# Patient Record
Sex: MALE | Race: WHITE | NOT HISPANIC OR LATINO | Employment: FULL TIME | URBAN - METROPOLITAN AREA
[De-identification: names, ages, dates, MRNs, and addresses within clinical notes are randomized per-mention and may not be internally consistent; named-entity substitution may affect disease eponyms.]

---

## 2017-01-20 ENCOUNTER — TELEPHONE (OUTPATIENT)
Dept: CARDIOLOGY | Facility: CLINIC | Age: 57
End: 2017-01-20

## 2017-03-01 ENCOUNTER — TELEPHONE (OUTPATIENT)
Dept: CARDIOLOGY | Facility: CLINIC | Age: 57
End: 2017-03-01

## 2017-03-01 NOTE — TELEPHONE ENCOUNTER
Received fax from [facility] stating pt has not called in INR test since 11/12/16. Tried to call pt. Non-working number. Thanks, Karla

## 2017-03-08 ENCOUNTER — TELEPHONE (OUTPATIENT)
Dept: CARDIOLOGY | Facility: CLINIC | Age: 57
End: 2017-03-08

## 2017-03-10 RX ORDER — WARFARIN SODIUM 5 MG/1
5 TABLET ORAL
Qty: 180 TABLET | Refills: 0 | Status: SHIPPED | OUTPATIENT
Start: 2017-03-10 | End: 2017-03-10 | Stop reason: SDUPTHER

## 2017-03-10 RX ORDER — WARFARIN SODIUM 5 MG/1
TABLET ORAL
Qty: 180 TABLET | Refills: 0 | Status: SHIPPED | OUTPATIENT
Start: 2017-03-10 | End: 2017-07-23 | Stop reason: SDUPTHER

## 2017-05-03 ENCOUNTER — TELEPHONE (OUTPATIENT)
Dept: CARDIOLOGY | Facility: CLINIC | Age: 57
End: 2017-05-03

## 2017-07-24 RX ORDER — WARFARIN SODIUM 5 MG/1
TABLET ORAL
Qty: 180 TABLET | Refills: 0 | Status: SHIPPED | OUTPATIENT
Start: 2017-07-24 | End: 2017-12-08 | Stop reason: SDUPTHER

## 2017-11-06 ENCOUNTER — TELEPHONE (OUTPATIENT)
Dept: CARDIOLOGY | Facility: CLINIC | Age: 57
End: 2017-11-06

## 2017-11-06 NOTE — TELEPHONE ENCOUNTER
Martha called requesting Home INR Form for pt that was sent tin October. Informed Dinora we have placed numerous calls to pt for appointments and INR's, no response. Pt has not been seen since 5/2016. Thanks, Karla

## 2017-12-07 ENCOUNTER — TELEPHONE (OUTPATIENT)
Dept: CARDIOLOGY | Facility: HOSPITAL | Age: 57
End: 2017-12-07

## 2017-12-07 ENCOUNTER — HOSPITAL ENCOUNTER (OUTPATIENT)
Dept: CARDIOLOGY | Facility: HOSPITAL | Age: 57
Setting detail: RECURRING SERIES
Discharge: HOME | End: 2017-12-07

## 2017-12-07 PROCEDURE — 36416 COLLJ CAPILLARY BLOOD SPEC: CPT

## 2017-12-07 PROCEDURE — 85610 PROTHROMBIN TIME: CPT

## 2017-12-07 NOTE — TELEPHONE ENCOUNTER
FYI: Called pt to inform spoke with Dinora this morning regarding INR meter. Pt requesting to have meter reactivated. Per Alere pt has been non-compliant with INR. Per pt appointments pt has not been seen by Dr. Lagunas since 2016. I myself has made numerous calls to pt along with Dinora. Called pt no answer, left voice mail informing pt to have PT-INR checked [place] [facility] with explanation on why meter will not be reactivated. Thanks, Karla

## 2017-12-08 RX ORDER — WARFARIN SODIUM 5 MG/1
5 TABLET ORAL
Qty: 60 TABLET | Refills: 0 | Status: SHIPPED | OUTPATIENT
Start: 2017-12-08 | End: 2017-12-20 | Stop reason: SDUPTHER

## 2017-12-08 NOTE — TELEPHONE ENCOUNTER
Pt called requesting Warfarin refill. Sent in 30 day supply with appointment scheduled for 1/10/18. Thanks, Karla

## 2017-12-14 RX ORDER — WARFARIN SODIUM 5 MG/1
TABLET ORAL
Qty: 180 TABLET | Refills: 0 | Status: SHIPPED | OUTPATIENT
Start: 2017-12-14 | End: 2019-03-28 | Stop reason: SDUPTHER

## 2017-12-20 ENCOUNTER — HOSPITAL ENCOUNTER (OUTPATIENT)
Dept: CARDIOLOGY | Facility: HOSPITAL | Age: 57
Setting detail: RECURRING SERIES
Discharge: HOME | End: 2017-12-20

## 2017-12-20 PROCEDURE — 85610 PROTHROMBIN TIME: CPT

## 2017-12-20 PROCEDURE — 36416 COLLJ CAPILLARY BLOOD SPEC: CPT

## 2017-12-20 RX ORDER — WARFARIN SODIUM 5 MG/1
TABLET ORAL
Qty: 60 TABLET | Refills: 0 | Status: SHIPPED | OUTPATIENT
Start: 2017-12-20 | End: 2017-12-20 | Stop reason: SDUPTHER

## 2017-12-21 RX ORDER — WARFARIN SODIUM 5 MG/1
TABLET ORAL
Qty: 180 TABLET | Refills: 0 | Status: SHIPPED | OUTPATIENT
Start: 2017-12-21 | End: 2018-06-08 | Stop reason: SDUPTHER

## 2017-12-29 ENCOUNTER — HOSPITAL ENCOUNTER (OUTPATIENT)
Dept: CARDIOLOGY | Facility: HOSPITAL | Age: 57
Setting detail: RECURRING SERIES
Discharge: HOME | End: 2017-12-29

## 2017-12-29 PROCEDURE — 85610 PROTHROMBIN TIME: CPT

## 2017-12-29 PROCEDURE — 36416 COLLJ CAPILLARY BLOOD SPEC: CPT

## 2018-01-10 ENCOUNTER — OFFICE VISIT (OUTPATIENT)
Dept: CARDIOLOGY | Facility: CLINIC | Age: 58
End: 2018-01-10

## 2018-01-10 ENCOUNTER — HOSPITAL ENCOUNTER (OUTPATIENT)
Dept: CARDIOLOGY | Facility: HOSPITAL | Age: 58
Setting detail: RECURRING SERIES
Discharge: HOME | End: 2018-01-10

## 2018-01-10 VITALS
SYSTOLIC BLOOD PRESSURE: 106 MMHG | HEART RATE: 73 BPM | WEIGHT: 188 LBS | BODY MASS INDEX: 27.85 KG/M2 | HEIGHT: 69 IN | DIASTOLIC BLOOD PRESSURE: 64 MMHG

## 2018-01-10 DIAGNOSIS — Z95.4 HISTORY OF AORTIC VALVE REPLACEMENT WITH METALLIC VALVE: Primary | ICD-10-CM

## 2018-01-10 PROCEDURE — 85610 PROTHROMBIN TIME: CPT

## 2018-01-10 PROCEDURE — 36416 COLLJ CAPILLARY BLOOD SPEC: CPT

## 2018-01-10 PROCEDURE — 99214 OFFICE O/P EST MOD 30 MIN: CPT | Performed by: INTERNAL MEDICINE

## 2018-01-10 PROCEDURE — 93000 ELECTROCARDIOGRAM COMPLETE: CPT | Performed by: INTERNAL MEDICINE

## 2018-01-10 NOTE — PROGRESS NOTES
Subjective:     Encounter Date:01/10/2018      Patient ID: Stew Cabral is a 57 y.o. male.    Chief Complaint:  History of Present Illness    This is a 57-year-old with a history of a bicuspid aortic valve resulting in critical aortic stenosis, status post mechanical aortic valve replacement with a 25 mm ATS valve in 1/2009, who presents for follow-up.    Patient was initially followed by Dr. Michelle Arenas.  Around the time of surgery he did have a nonischemic cardiomyopathy with an ejection fraction of 30%.  A repeat echocardiogram and follow-up later showed normalization of his ejection fraction.  I began following him in 12/2013 when he presented to his care.  I set him up for repeat echocardiogram at that time to continue show normal LV function and a normally functioning aortic valve.  He is been following his INRs with a home INR monitor since his surgery.  I saw him last in the office in 5/2016 at which time he was doing well.  He proceeded with a routine echocardiogram at that time it continued to show normal LV function and a normal functioning aortic valve.    The patient was lost to follow-up and has not been seen until today.  In the meanwhile he has been noncompliant with reporting his INR is 2L ear and unfortunately he has some been able to continue to use her services.  Since then he's been getting his INRs checked and her anticoagulation clinic in the office.  He just had his INR checked today and it was therapeutic at 3.  He otherwise feels well.  Denies any chest pain, shortness of breath, PND or orthopnea, presyncope or syncope, palpitations, or lower extremity edema.    Review of Systems   Constitution: Negative for weakness and malaise/fatigue.   HENT: Negative for hearing loss, hoarse voice, nosebleeds and sore throat.    Eyes: Negative for pain.   Cardiovascular: Negative for chest pain, claudication, cyanosis, dyspnea on exertion, irregular heartbeat, leg swelling, near-syncope,  orthopnea, palpitations, paroxysmal nocturnal dyspnea and syncope.   Respiratory: Negative for shortness of breath and snoring.    Endocrine: Negative for cold intolerance, heat intolerance, polydipsia, polyphagia and polyuria.   Skin: Negative for itching and rash.   Musculoskeletal: Negative for arthritis, falls, joint pain, joint swelling, muscle cramps, muscle weakness and myalgias.   Gastrointestinal: Negative for constipation, diarrhea, dysphagia, heartburn, hematemesis, hematochezia, melena, nausea and vomiting.   Genitourinary: Negative for frequency, hematuria and hesitancy.   Neurological: Negative for excessive daytime sleepiness, dizziness, headaches, light-headedness and numbness.   Psychiatric/Behavioral: Negative for depression. The patient is not nervous/anxious.           Current Outpatient Prescriptions:   •  lisdexamfetamine (VYVANSE) 50 MG capsule, Take 50 mg by mouth every morning., Disp: , Rfl:   •  warfarin (COUMADIN) 5 MG tablet, TAKE 1 TABLET BY MOUTH DAILY OR AS DIRECTED, Disp: 180 tablet, Rfl: 0  •  warfarin (COUMADIN) 5 MG tablet, TAKE 2 TABLETS BY MOUTH DAILY, Disp: 180 tablet, Rfl: 0    Past Medical History:   Diagnosis Date   • ADD (attention deficit disorder)    • Aortic valvar stenosis    • Bicuspid aortic valve    • CHF (congestive heart failure)    • Displacement of lumbar intervertebral disc      Past Surgical History:   Procedure Laterality Date   • AORTIC VALVE REPAIR/REPLACEMENT  2009   • CORONARY ANGIOPLASTY Left     AV stenosis   • CORONARY ARTERY BYPASS GRAFT       History reviewed. No pertinent family history.  Social History   Substance Use Topics   • Smoking status: Current Every Day Smoker   • Smokeless tobacco: None   • Alcohol use None           ECG 12 Lead  Date/Time: 1/10/2018 10:27 AM  Performed by: MONE DORANTES  Authorized by: MONE DORANTES   Comparison: compared with previous ECG   Similar to previous ECG  Rhythm: sinus rhythm  Other findings: LVH with  "strain               Objective:         Visit Vitals   • /64 (BP Location: Right arm, Patient Position: Sitting)   • Pulse 73   • Ht 175.3 cm (69\")   • Wt 85.3 kg (188 lb)   • BMI 27.76 kg/m2          Physical Exam   Constitutional: He is oriented to person, place, and time. He appears well-developed and well-nourished.   HENT:   Head: Normocephalic and atraumatic.   Eyes: Conjunctivae, EOM and lids are normal. Pupils are equal, round, and reactive to light.   Neck: Normal range of motion and full passive range of motion without pain. Neck supple. No JVD present. Carotid bruit is not present.   Cardiovascular: Normal rate, regular rhythm, S1 normal and S2 normal.  Exam reveals no gallop.    No murmur heard.  Pulses:       Radial pulses are 2+ on the right side, and 2+ on the left side.   No bilateral lower extremity edema  + aortic valve click   Pulmonary/Chest: Effort normal and breath sounds normal.   Abdominal: Soft. Normal appearance.   Lymphadenopathy:     He has no cervical adenopathy.   Neurological: He is alert and oriented to person, place, and time.   Skin: Skin is warm, dry and intact.   Psychiatric: He has a normal mood and affect.       Lab Review:       Assessment:          Diagnosis Plan   1. History of aortic valve replacement with metallic valve  Adult Transthoracic Echo Complete W/ Cont if Necessary Per Protocol          Plan:       1.  Status post mechanical aortic valve.  Appears to be functioning normally back in 6/2016 on his last echocardiogram.  On exam today it continues to sound like it's functioning normally.  Continue to monitor his INRs through our anticoagulation clinic.  Will plan on a routine follow-up echocardiogram at his next office visit in 1 year.     Will plan on seeing him back in 1 year with a repeat echocardiogram.          "

## 2018-02-01 ENCOUNTER — HOSPITAL ENCOUNTER (OUTPATIENT)
Dept: CARDIOLOGY | Facility: HOSPITAL | Age: 58
Setting detail: RECURRING SERIES
Discharge: HOME | End: 2018-02-01

## 2018-02-01 PROCEDURE — 85610 PROTHROMBIN TIME: CPT

## 2018-02-01 PROCEDURE — 36416 COLLJ CAPILLARY BLOOD SPEC: CPT

## 2018-02-08 ENCOUNTER — HOSPITAL ENCOUNTER (OUTPATIENT)
Dept: CARDIOLOGY | Facility: HOSPITAL | Age: 58
Setting detail: RECURRING SERIES
Discharge: HOME | End: 2018-02-08

## 2018-02-08 PROCEDURE — 85610 PROTHROMBIN TIME: CPT

## 2018-02-08 PROCEDURE — 36416 COLLJ CAPILLARY BLOOD SPEC: CPT

## 2018-02-13 ENCOUNTER — HOSPITAL ENCOUNTER (OUTPATIENT)
Dept: CARDIOLOGY | Facility: HOSPITAL | Age: 58
Setting detail: RECURRING SERIES
Discharge: HOME | End: 2018-02-13

## 2018-02-13 PROCEDURE — 36416 COLLJ CAPILLARY BLOOD SPEC: CPT

## 2018-02-13 PROCEDURE — 85610 PROTHROMBIN TIME: CPT

## 2018-02-22 ENCOUNTER — TELEPHONE (OUTPATIENT)
Dept: CARDIOLOGY | Facility: HOSPITAL | Age: 58
End: 2018-02-22

## 2018-02-22 ENCOUNTER — HOSPITAL ENCOUNTER (OUTPATIENT)
Dept: CARDIOLOGY | Facility: HOSPITAL | Age: 58
Setting detail: RECURRING SERIES
Discharge: HOME | End: 2018-02-22

## 2018-02-22 PROCEDURE — 85610 PROTHROMBIN TIME: CPT

## 2018-02-22 PROCEDURE — 36416 COLLJ CAPILLARY BLOOD SPEC: CPT

## 2018-02-22 NOTE — TELEPHONE ENCOUNTER
He has been on warfarin chronically for several years for a mechanical aortic valve replacement.  He has no option but to be on warfarin.  He has had compliance issues with following up on his INR checks in the past which is why he no longer has a home INR monitor is coming into the office.  I am not sure what to make of the lability of his INR's and how to prevent them.

## 2018-02-28 ENCOUNTER — HOSPITAL ENCOUNTER (OUTPATIENT)
Dept: CARDIOLOGY | Facility: HOSPITAL | Age: 58
Setting detail: RECURRING SERIES
Discharge: HOME | End: 2018-02-28

## 2018-02-28 PROCEDURE — 85610 PROTHROMBIN TIME: CPT

## 2018-02-28 PROCEDURE — 36416 COLLJ CAPILLARY BLOOD SPEC: CPT

## 2018-03-14 ENCOUNTER — HOSPITAL ENCOUNTER (OUTPATIENT)
Dept: CARDIOLOGY | Facility: HOSPITAL | Age: 58
Setting detail: RECURRING SERIES
Discharge: HOME | End: 2018-03-14

## 2018-03-14 PROCEDURE — 85610 PROTHROMBIN TIME: CPT

## 2018-03-14 PROCEDURE — 36416 COLLJ CAPILLARY BLOOD SPEC: CPT

## 2018-03-21 ENCOUNTER — HOSPITAL ENCOUNTER (OUTPATIENT)
Dept: CARDIOLOGY | Facility: HOSPITAL | Age: 58
Setting detail: RECURRING SERIES
Discharge: HOME | End: 2018-03-21

## 2018-03-21 PROCEDURE — 85610 PROTHROMBIN TIME: CPT

## 2018-03-21 PROCEDURE — 36416 COLLJ CAPILLARY BLOOD SPEC: CPT

## 2018-04-12 ENCOUNTER — HOSPITAL ENCOUNTER (OUTPATIENT)
Dept: CARDIOLOGY | Facility: HOSPITAL | Age: 58
Setting detail: RECURRING SERIES
Discharge: HOME | End: 2018-04-12

## 2018-04-12 PROCEDURE — 85610 PROTHROMBIN TIME: CPT

## 2018-04-12 PROCEDURE — 36416 COLLJ CAPILLARY BLOOD SPEC: CPT

## 2018-05-17 ENCOUNTER — HOSPITAL ENCOUNTER (OUTPATIENT)
Dept: CARDIOLOGY | Facility: HOSPITAL | Age: 58
Setting detail: RECURRING SERIES
Discharge: HOME | End: 2018-05-17

## 2018-05-17 PROCEDURE — 36416 COLLJ CAPILLARY BLOOD SPEC: CPT

## 2018-05-17 PROCEDURE — 85610 PROTHROMBIN TIME: CPT

## 2018-06-08 RX ORDER — WARFARIN SODIUM 5 MG/1
TABLET ORAL
Qty: 180 TABLET | Refills: 0 | Status: SHIPPED | OUTPATIENT
Start: 2018-06-08 | End: 2018-10-15 | Stop reason: SDUPTHER

## 2018-07-19 ENCOUNTER — HOSPITAL ENCOUNTER (OUTPATIENT)
Dept: CARDIOLOGY | Facility: HOSPITAL | Age: 58
Setting detail: RECURRING SERIES
Discharge: HOME | End: 2018-07-19

## 2018-07-19 PROCEDURE — 85610 PROTHROMBIN TIME: CPT

## 2018-07-19 PROCEDURE — 36416 COLLJ CAPILLARY BLOOD SPEC: CPT

## 2018-08-09 ENCOUNTER — HOSPITAL ENCOUNTER (OUTPATIENT)
Dept: CARDIOLOGY | Facility: HOSPITAL | Age: 58
Setting detail: RECURRING SERIES
Discharge: HOME | End: 2018-08-09

## 2018-08-09 PROCEDURE — 36416 COLLJ CAPILLARY BLOOD SPEC: CPT

## 2018-08-09 PROCEDURE — 85610 PROTHROMBIN TIME: CPT

## 2018-08-30 ENCOUNTER — HOSPITAL ENCOUNTER (OUTPATIENT)
Dept: CARDIOLOGY | Facility: HOSPITAL | Age: 58
Setting detail: RECURRING SERIES
Discharge: HOME | End: 2018-08-30

## 2018-08-30 PROCEDURE — 36416 COLLJ CAPILLARY BLOOD SPEC: CPT

## 2018-08-30 PROCEDURE — 85610 PROTHROMBIN TIME: CPT

## 2018-09-13 ENCOUNTER — ANTICOAGULATION VISIT (OUTPATIENT)
Dept: PHARMACY | Facility: HOSPITAL | Age: 58
End: 2018-09-13

## 2018-09-13 DIAGNOSIS — Z95.2 HX OF MITRAL VALVE REPLACEMENT WITH MECHANICAL VALVE: ICD-10-CM

## 2018-09-13 DIAGNOSIS — I48.91 ATRIAL FIBRILLATION, UNSPECIFIED TYPE (HCC): ICD-10-CM

## 2018-09-13 DIAGNOSIS — Z95.2 HX OF AORTIC VALVE REPLACEMENT, MECHANICAL: ICD-10-CM

## 2018-09-13 LAB
INR PPP: 1.8 (ref 0.91–1.09)
PROTHROMBIN TIME: 21.4 SECONDS (ref 10–13.8)

## 2018-09-13 PROCEDURE — 36416 COLLJ CAPILLARY BLOOD SPEC: CPT

## 2018-09-13 PROCEDURE — 85610 PROTHROMBIN TIME: CPT

## 2018-09-13 PROCEDURE — G0463 HOSPITAL OUTPT CLINIC VISIT: HCPCS

## 2018-09-13 NOTE — PATIENT INSTRUCTIONS
Take an extra one-half tablet today only.  Return to regular schedule tomorrow.  Recheck in one week.

## 2018-09-13 NOTE — PROGRESS NOTES
Anticoagulation Clinic Progress Note  Anticoagulation Summary  As of 2018    INR goal:   2.5-3.5   TTR:   --   Today's INR:   1.8!   Warfarin maintenance plan:   7.5 mg on Sun, Tue, Sat; 10 mg all other days   Weekly warfarin total:   62.5 mg   Plan last modified:   Martha Valle RPH (2018)   Next INR check:   2018   Priority:   High   Target end date:   Indefinite    Indications    Hx of mitral valve replacement with mechanical valve [Z95.2] [Z95.2]  Hx of aortic valve replacement  mechanical [Z95.2] [Z95.2]  Atrial fibrillation (CMS/HCC) [I48.91] [I48.91]             Anticoagulation Episode Summary     INR check location:       Preferred lab:       Send INR reminders to:   [facility] POOL    Comments:         Anticoagulation Care Providers     Provider Role Specialty Phone number    Kitty Lagunas MD Referring Cardiology [phone]    Martha Valle RPH Responsible              Drug interactions: no changes but has not been taking Vyvanse.  Diet: no changes    Clinic Interview:  Patient Findings     Negatives:   Signs/symptoms of thrombosis, Signs/symptoms of bleeding,   Laboratory test error suspected, Change in health, Change in alcohol use,   Change in activity, Upcoming invasive procedure, Emergency department   visit, Upcoming dental procedure, Missed doses, Extra doses, Change in   medications, Change in diet/appetite, Hospital admission, Bruising, Other   complaints      Clinical Outcomes     Negatives:   Major bleeding event, Thromboembolic event,   Anticoagulation-related hospital admission, Anticoagulation-related ED   visit, Anticoagulation-related fatality        Education:  Stew Cabral is a new start in the [facility]. We discussed the followin) Warfarin's indication, mechanism, and dosing  2) Enforced the importance of taking warfarin as instructed and at the same time every day, preferably in the evening so that we can make dose  adjustments more easily following subsequent clinic visits  3) What he should do about a missed dose; pts can take missed doses within about 12 hours of their usual scheduled dose, but he was instructed on the importance of not doubling up on doses unless told to do so by the [facility]  4) Explained possible side effects of warfarin therapy, including increased risk of bleeding, s/sx of bleeding and s/sx of any additional clots/PE/CVA.   5) Discussed monitoring of warfarin, the INR, goal INR range, and the frequency of monitoring  6) Reviewed drug/food/tobacco/EtOH interactions and provided written information covering these topics in more detail, explaining that green, leafy vegetables interact most heavily with warfarin  7) Instructed the pt not to take or discontinue any medications without informing his physician/pharmacist and reminded him to inform us of any dietary changes, as well  8) Explained that he would be coming into the clinic more frequently in these first few weeks of therapy as we try to adjust his dose and achieve a therapeutic INR x 2 consecutive readings. Once that is achieved, patient will follow up in clinic every 4 weeks, on average.    He stated no problems with transportation or scheduling clinic appts in this manner. he expressed understanding of the information provided and has no additional questions at this time.    Stew Cabral was presented with a copy of the Patients Rights and Responsibilities. he expressed verbal consent and agreement to receive care in the [facility] under the current collaborative care agreement with [facility].       INR History:  Previous INR data from [facility] is recorded in [facility] and scanned into the patient's chart in [employer]. These results have been analyzed and reviewed.      Plan:  1. INR is Subtherapeutic today- see above in Anticoagulation Summary. Patient thinks he has missed doses  and is on split schedule so difficult to stay consistent.  Will instruct Stew Cabral to Continue their warfarin regimen- see above in Anticoagulation Summary.  Patient will take extra 2.5mg today only.    2. Follow up in 1 week  3. Patient declines warfarin refills.  4. Verbal and written information provided. Patient expresses understanding and has no further questions at this time.    Martha Valle [facility]

## 2018-09-20 ENCOUNTER — ANTICOAGULATION VISIT (OUTPATIENT)
Dept: PHARMACY | Facility: HOSPITAL | Age: 58
End: 2018-09-20

## 2018-09-20 DIAGNOSIS — I48.91 ATRIAL FIBRILLATION, UNSPECIFIED TYPE (HCC): ICD-10-CM

## 2018-09-20 DIAGNOSIS — Z95.2 HX OF MITRAL VALVE REPLACEMENT WITH MECHANICAL VALVE: ICD-10-CM

## 2018-09-20 DIAGNOSIS — Z95.2 HX OF AORTIC VALVE REPLACEMENT, MECHANICAL: ICD-10-CM

## 2018-09-20 LAB
INR PPP: 3.4 (ref 0.91–1.09)
PROTHROMBIN TIME: 40.5 SECONDS (ref 10–13.8)

## 2018-09-20 PROCEDURE — 36416 COLLJ CAPILLARY BLOOD SPEC: CPT

## 2018-09-20 PROCEDURE — 85610 PROTHROMBIN TIME: CPT

## 2018-09-20 NOTE — PROGRESS NOTES
Anticoagulation Clinic Progress Note    Anticoagulation Summary  As of 9/20/2018    INR goal:   2.5-3.5   TTR:   --   Today's INR:   3.4   Warfarin maintenance plan:   7.5 mg on Sun, Tue, Sat; 10 mg all other days   Weekly warfarin total:   62.5 mg   No change documented:   Zahraa Sexton RPH   Plan last modified:   Martha Valle RPH (9/13/2018)   Next INR check:   10/4/2018   Priority:   High   Target end date:   Indefinite    Indications    Hx of mitral valve replacement with mechanical valve [Z95.2] [Z95.2]  Hx of aortic valve replacement  mechanical [Z95.2] [Z95.2]  Atrial fibrillation (CMS/HCC) [I48.91] [I48.91]             Anticoagulation Episode Summary     INR check location:       Preferred lab:       Send INR reminders to:    [facility] POOL    Comments:         Anticoagulation Care Providers     Provider Role Specialty Phone number    Kitty Lagunas MD Referring Cardiology [phone]    Martha Valle RPH Responsible            Drug interactions: has remained unchanged.  Diet: has remained unchanged.    Clinic Interview:  Patient Findings     Negatives:   Signs/symptoms of thrombosis, Signs/symptoms of bleeding,   Laboratory test error suspected, Change in health, Change in alcohol use,   Change in activity, Upcoming invasive procedure, Emergency department   visit, Upcoming dental procedure, Missed doses, Extra doses, Change in   medications, Change in diet/appetite, Hospital admission, Bruising, Other   complaints      Clinical Outcomes     Negatives:   Major bleeding event, Thromboembolic event,   Anticoagulation-related hospital admission, Anticoagulation-related ED   visit, Anticoagulation-related fatality        INR History:  Anticoagulation Monitoring 9/13/2018 9/20/2018   INR 1.8 3.4   INR Date 9/13/2018 9/20/2018   INR Goal 2.5-3.5 2.5-3.5   Trend - Same   Last Week Total 0 mg 65 mg   Next Week Total 65 mg 62.5 mg   Sun 7.5 mg 7.5 mg   Mon 10 mg 10 mg   Tue 7.5 mg 7.5 mg   Wed  10 mg 10 mg   Thu 12.5 mg (9/13) 10 mg   Fri 10 mg 10 mg   Sat 7.5 mg 7.5 mg   Visit Report - -       Previous INR data from [facility] is recorded in [facility] and scanned into the patient's chart in [facility]. These results have been analyzed and reviewed.      Plan:  1. INR is Therapeutic today- see above in Anticoagulation Summary.  Will instruct Stew Cabral to Continue their warfarin regimen- see above in Anticoagulation Summary.  2. Follow up in 2 weeks  3. Patient declines warfarin refills.  4. Verbal and written information provided. Patient expresses understanding and has no further questions at this time.    Zahraa Sexton [facility]

## 2018-10-10 ENCOUNTER — TELEPHONE (OUTPATIENT)
Dept: PHARMACY | Facility: HOSPITAL | Age: 58
End: 2018-10-10

## 2018-10-15 RX ORDER — WARFARIN SODIUM 5 MG/1
TABLET ORAL
Qty: 180 TABLET | Refills: 0 | Status: SHIPPED | OUTPATIENT
Start: 2018-10-15 | End: 2019-02-13 | Stop reason: SDUPTHER

## 2018-10-18 ENCOUNTER — ANTICOAGULATION VISIT (OUTPATIENT)
Dept: PHARMACY | Facility: HOSPITAL | Age: 58
End: 2018-10-18

## 2018-10-18 DIAGNOSIS — Z95.2 HX OF AORTIC VALVE REPLACEMENT, MECHANICAL: ICD-10-CM

## 2018-10-18 DIAGNOSIS — Z95.2 HX OF MITRAL VALVE REPLACEMENT WITH MECHANICAL VALVE: ICD-10-CM

## 2018-10-18 LAB
INR PPP: 4.7 (ref 0.91–1.09)
PROTHROMBIN TIME: 56.9 SECONDS (ref 10–13.8)

## 2018-10-18 PROCEDURE — 85610 PROTHROMBIN TIME: CPT

## 2018-10-18 PROCEDURE — G0463 HOSPITAL OUTPT CLINIC VISIT: HCPCS

## 2018-10-18 PROCEDURE — 36416 COLLJ CAPILLARY BLOOD SPEC: CPT

## 2018-10-18 NOTE — PROGRESS NOTES
Anticoagulation Clinic Progress Note    Anticoagulation Summary  As of 10/18/2018    INR goal:   2.5-3.5   TTR:   0.0 % (3.6 wk)   Today's INR:   4.7!   Warfarin maintenance plan:   7.5 mg on Sun, Tue, Sat; 10 mg all other days   Weekly warfarin total:   62.5 mg   Plan last modified:   Martha Valle RPH (9/13/2018)   Next INR check:   10/25/2018   Priority:   High   Target end date:   Indefinite    Indications    Hx of mitral valve replacement with mechanical valve [Z95.2] [Z95.2]  Hx of aortic valve replacement  mechanical [Z95.2] [Z95.2]  Atrial fibrillation (CMS/HCC) [I48.91] [I48.91]             Anticoagulation Episode Summary     INR check location:       Preferred lab:       Send INR reminders to:   [facility] POOL    Comments:         Anticoagulation Care Providers     Provider Role Specialty Phone number    Kitty Lagunas MD Referring Cardiology [phone]    Martha Valle RPH Responsible            Drug interactions: has remained unchanged.  Diet: has remained unchanged.    Clinic Interview:  Patient Findings     Positives:   Missed doses    Negatives:   Signs/symptoms of thrombosis, Signs/symptoms of bleeding,   Laboratory test error suspected, Change in health, Change in alcohol use,   Change in activity, Upcoming invasive procedure, Emergency department   visit, Upcoming dental procedure, Extra doses, Change in medications,   Change in diet/appetite, Hospital admission, Bruising, Other complaints    Comments:   Missed a 7.5mg dose on Tuesday 10/16.      Clinical Outcomes     Negatives:   Major bleeding event, Thromboembolic event,   Anticoagulation-related hospital admission, Anticoagulation-related ED   visit, Anticoagulation-related fatality    Comments:   Missed a 7.5mg dose on Tuesday 10/16.        INR History:  Anticoagulation Monitoring 9/13/2018 9/20/2018 10/18/2018   INR 1.8 3.4 4.7   INR Date 9/13/2018 9/20/2018 10/18/2018   INR Goal 2.5-3.5 2.5-3.5 2.5-3.5   Trend - Same Same    Last Week Total 0 mg 65 mg 55 mg   Next Week Total 65 mg 62.5 mg 57.5 mg   Sun 7.5 mg 7.5 mg 7.5 mg   Mon 10 mg 10 mg 10 mg   Tue 7.5 mg 7.5 mg 7.5 mg   Wed 10 mg 10 mg 10 mg   Thu 12.5 mg (9/13) 10 mg 10 mg   Fri 10 mg 10 mg 5 mg (10/19)   Sat 7.5 mg 7.5 mg 7.5 mg   Visit Report - - -   Some recent data might be hidden       Previous INR data from [facility] is recorded in [facility] and scanned into the patient's chart in [facility]. These results have been analyzed and reviewed.    Pt was given the option to do a lab draw to ensure the accuracy of INR reading.  He was informed that [place] machine can sometimes provide inaccurate reading at higher numbers.  He expressed verbal understanding and refused the lab draw.    Plan:  1. INR is Supratherapeutic today- see above in Anticoagulation Summary.  Will instruct Stew Cabral to take 5mg this Friday, then resume their warfarin regimen- see above in Anticoagulation Summary.  2. Follow up in 1 week  3. Patient declines warfarin refills.  4. Verbal and written information provided. Patient expresses understanding and has no further questions at this time.    Barbara Carrasco [facility]

## 2018-10-25 ENCOUNTER — APPOINTMENT (OUTPATIENT)
Dept: LAB | Facility: HOSPITAL | Age: 58
End: 2018-10-25

## 2018-10-25 ENCOUNTER — ANTICOAGULATION VISIT (OUTPATIENT)
Dept: PHARMACY | Facility: HOSPITAL | Age: 58
End: 2018-10-25

## 2018-10-25 DIAGNOSIS — Z95.2 HX OF AORTIC VALVE REPLACEMENT, MECHANICAL: ICD-10-CM

## 2018-10-25 DIAGNOSIS — Z95.2 HX OF MITRAL VALVE REPLACEMENT WITH MECHANICAL VALVE: ICD-10-CM

## 2018-10-25 LAB
INR PPP: 3.33 (ref 0.9–1.1)
INR PPP: 5.7 (ref 0.91–1.09)
PROTHROMBIN TIME: 33.3 SECONDS (ref 11.7–14.2)
PROTHROMBIN TIME: 68.5 SECONDS (ref 10–13.8)

## 2018-10-25 PROCEDURE — 85610 PROTHROMBIN TIME: CPT

## 2018-10-25 PROCEDURE — G0463 HOSPITAL OUTPT CLINIC VISIT: HCPCS

## 2018-10-25 PROCEDURE — 36415 COLL VENOUS BLD VENIPUNCTURE: CPT

## 2018-10-25 PROCEDURE — 36416 COLLJ CAPILLARY BLOOD SPEC: CPT

## 2018-10-25 NOTE — PROGRESS NOTES
Anticoagulation Clinic Progress Note    Anticoagulation Summary  As of 10/25/2018    INR goal:   2.5-3.5   TTR:   0.0 % (1.1 mo)   Today's INR:   3.33   Warfarin maintenance plan:   10 mg on Mon, Wed, Fri; 7.5 mg all other days   Weekly warfarin total:   60 mg   Plan last modified:   Barbara Carrasco RPH (10/25/2018)   Next INR check:   11/8/2018   Priority:   High   Target end date:   Indefinite    Indications    Hx of mitral valve replacement with mechanical valve [Z95.2] [Z95.2]  Hx of aortic valve replacement  mechanical [Z95.2] [Z95.2]  Atrial fibrillation (CMS/HCC) [I48.91] [I48.91]             Anticoagulation Episode Summary     INR check location:       Preferred lab:       Send INR reminders to:   [facility] POOL    Comments:         Anticoagulation Care Providers     Provider Role Specialty Phone number    Kitty Lagunas MD Referring Cardiology [phone]    Martha Valle RPH Responsible            Drug interactions: has remained unchanged.  Diet: has remained unchanged.    Clinic Interview:      INR History:  Anticoagulation Monitoring 9/20/2018 10/18/2018 10/25/2018   INR 3.4 4.7 3.33   INR Date 9/20/2018 10/18/2018 10/25/2018   INR Goal 2.5-3.5 2.5-3.5 2.5-3.5   Trend Same Same Down   Last Week Total 65 mg 55 mg 57.5 mg   Next Week Total 62.5 mg 57.5 mg 60 mg   Sun 7.5 mg 7.5 mg 7.5 mg   Mon 10 mg 10 mg 10 mg   Tue 7.5 mg 7.5 mg 7.5 mg   Wed 10 mg 10 mg 10 mg   Thu 10 mg 10 mg 7.5 mg   Fri 10 mg 5 mg (10/19) 10 mg   Sat 7.5 mg 7.5 mg 7.5 mg   Visit Report - - -   Some recent data might be hidden       Previous INR data from [facility] is recorded in [facility] and scanned into the patient's chart in [facility]. These results have been analyzed and reviewed.      Plan:  1. INR is Therapeutic today- see above in Anticoagulation Summary.  Will instruct Stew GABINO Cabral to change their warfarin regimen- see above in Anticoagulation Summary.  2. Follow up in 2 weeks  3. Patient declines  warfarin refills.  4. Verbal and written information provided. Patient expresses understanding and has no further questions at this time.    Barbara Carrasco RP

## 2018-11-08 ENCOUNTER — TELEPHONE (OUTPATIENT)
Dept: PHARMACY | Facility: HOSPITAL | Age: 58
End: 2018-11-08

## 2018-11-28 ENCOUNTER — TELEPHONE (OUTPATIENT)
Dept: PHARMACY | Facility: HOSPITAL | Age: 58
End: 2018-11-28

## 2018-11-29 ENCOUNTER — ANTICOAGULATION VISIT (OUTPATIENT)
Dept: PHARMACY | Facility: HOSPITAL | Age: 58
End: 2018-11-29

## 2018-11-29 DIAGNOSIS — Z95.2 HX OF MITRAL VALVE REPLACEMENT WITH MECHANICAL VALVE: ICD-10-CM

## 2018-11-29 DIAGNOSIS — Z95.2 HX OF AORTIC VALVE REPLACEMENT, MECHANICAL: ICD-10-CM

## 2018-11-29 LAB
INR PPP: 3.8 (ref 0.91–1.09)
PROTHROMBIN TIME: 45.1 SECONDS (ref 10–13.8)

## 2018-11-29 PROCEDURE — 85610 PROTHROMBIN TIME: CPT

## 2018-11-29 PROCEDURE — 36416 COLLJ CAPILLARY BLOOD SPEC: CPT

## 2018-11-29 PROCEDURE — G0463 HOSPITAL OUTPT CLINIC VISIT: HCPCS

## 2018-11-29 NOTE — PROGRESS NOTES
Anticoagulation Clinic Progress Note    Anticoagulation Summary  As of 11/29/2018    INR goal:   2.5-3.5   TTR:   0.0 % (2.2 mo)   INR used for dosing:   3.8! (11/29/2018)   Warfarin maintenance plan:   10 mg on Mon, Wed, Fri; 7.5 mg all other days   Weekly warfarin total:   60 mg   No change documented:   Imtiaz Britton [facility]   Plan last modified:   Barbara Carrasco [facility] (10/25/2018)   Next INR check:   12/20/2018   Priority:   High   Target end date:   Indefinite    Indications    Hx of mitral valve replacement with mechanical valve [Z95.2] [Z95.2]  Hx of aortic valve replacement  mechanical [Z95.2] [Z95.2]  Atrial fibrillation (CMS/HCC) [I48.91] [I48.91]             Anticoagulation Episode Summary     INR check location:       Preferred lab:       Send INR reminders to:   [facility]ICAL POOL    Comments:         Anticoagulation Care Providers     Provider Role Specialty Phone number    Kitty Lagunas MD Referring Cardiology [phone]    Martha Valle [facility] Responsible  [phone]          Drug interactions: has remained unchanged.  Diet: has remained unchanged.    Clinic Interview:  Patient Findings     Positives:   Extra doses    Negatives:   Signs/symptoms of thrombosis, Signs/symptoms of bleeding,   Laboratory test error suspected, Change in health, Change in alcohol use,   Change in activity, Upcoming invasive procedure, Emergency department   visit, Upcoming dental procedure, Missed doses, Change in medications,   Change in diet/appetite, Hospital admission, Bruising, Other complaints    Comments:   Pt states he may have taken 10mg instead of 7.5mg on Sun, but   he does not remember for sure      Clinical Outcomes     Negatives:   Major bleeding event, Thromboembolic event,   Anticoagulation-related hospital admission, Anticoagulation-related ED   visit, Anticoagulation-related fatality    Comments:   Pt states he may have taken 10mg instead of 7.5mg on Sun, but   he does not remember for sure         INR History:  Anticoagulation Monitoring 10/18/2018 10/25/2018 11/29/2018   INR 4.7 3.33 3.8   INR Date 10/18/2018 10/25/2018 11/29/2018   INR Goal 2.5-3.5 2.5-3.5 2.5-3.5   Trend Same Down Same   Last Week Total 55 mg 57.5 mg 60 mg   Next Week Total 57.5 mg 60 mg 60 mg   Sun 7.5 mg 7.5 mg 7.5 mg   Mon 10 mg 10 mg 10 mg   Tue 7.5 mg 7.5 mg 7.5 mg   Wed 10 mg 10 mg 10 mg   Thu 10 mg 7.5 mg 7.5 mg   Fri 5 mg (10/19) 10 mg 10 mg   Sat 7.5 mg 7.5 mg 7.5 mg   Visit Report - - -   Some recent data might be hidden       Plan:  1. INR is Supratherapeutic today- see above in Anticoagulation Summary.  Will instruct Stew Cabral to Continue their warfarin regimen- see above in Anticoagulation Summary.  2. Follow up in 3 weeks. Pt refused a 2 week follow up  3. Patient declines warfarin refills.  4. Verbal and written information provided. Patient expresses understanding and has no further questions at this time.    Imtiaz Britton [facility]

## 2018-12-20 ENCOUNTER — ANTICOAGULATION VISIT (OUTPATIENT)
Dept: PHARMACY | Facility: HOSPITAL | Age: 58
End: 2018-12-20

## 2018-12-20 DIAGNOSIS — Z95.2 HX OF AORTIC VALVE REPLACEMENT, MECHANICAL: ICD-10-CM

## 2018-12-20 DIAGNOSIS — Z95.2 HX OF MITRAL VALVE REPLACEMENT WITH MECHANICAL VALVE: ICD-10-CM

## 2018-12-20 LAB
INR PPP: 4.7 (ref 0.91–1.09)
PROTHROMBIN TIME: 57 SECONDS (ref 10–13.8)

## 2018-12-20 PROCEDURE — G0463 HOSPITAL OUTPT CLINIC VISIT: HCPCS

## 2018-12-20 PROCEDURE — 36416 COLLJ CAPILLARY BLOOD SPEC: CPT

## 2018-12-20 PROCEDURE — 85610 PROTHROMBIN TIME: CPT

## 2018-12-20 NOTE — PROGRESS NOTES
Anticoagulation Clinic Progress Note    Anticoagulation Summary  As of 2018    INR goal:   2.5-3.5   TTR:   0.0 % (2.9 mo)   INR used for dosin.7! (2018)   Warfarin maintenance plan:   10 mg on Mon, Fri; 7.5 mg all other days   Weekly warfarin total:   57.5 mg   Plan last modified:   Martha Valle RPH (2018)   Next INR check:   2018   Priority:   High   Target end date:   Indefinite    Indications    Hx of mitral valve replacement with mechanical valve [Z95.2] [Z95.2]  Hx of aortic valve replacement  mechanical [Z95.2] [Z95.2]  Atrial fibrillation (CMS/HCC) [I48.91] [I48.91]             Anticoagulation Episode Summary     INR check location:       Preferred lab:       Send INR reminders to:   MARIANNE BILLS [facility]    Comments:         Anticoagulation Care Providers     Provider Role Specialty Phone number    Kitty Lagunas MD Referring Cardiology [phone]          Clinic Interview:  Patient Findings     Positives:   Change in alcohol use    Comments:   etoh at pool games on  weekly      Clinical Outcomes     Comments:   etoh at pool games on  weekly        INR History:  Anticoagulation Monitoring 10/25/2018 2018 2018   INR 3.33 3.8 4.7   INR Date 10/25/2018 2018 2018   INR Goal 2.5-3.5 2.5-3.5 2.5-3.5   Trend Down Same Down   Last Week Total 57.5 mg 60 mg 60 mg   Next Week Total 60 mg 60 mg 52.5 mg   Sun 7.5 mg 7.5 mg 7.5 mg   Mon 10 mg 10 mg 10 mg   Tue 7.5 mg 7.5 mg 7.5 mg   Wed 10 mg 10 mg -   Thu 7.5 mg 7.5 mg 2.5 mg ()   Fri 10 mg 10 mg 10 mg   Sat 7.5 mg 7.5 mg 7.5 mg   Visit Report - - -   Some recent data might be hidden       Plan:  1. INR is Supratherapeutic today- see above in Anticoagulation Summary.  Will instruct Stew GABINO Cabral to Change their warfarin regimen- see above in Anticoagulation Summary.  Take on 2.5mg today, reduce weekly dose to 10mg M, and 7.5mg other 5 days.  2. Follow up in 1 week  3. Patient  declines warfarin refills.  4. Verbal and written information provided. Patient expresses understanding and has no further questions at this time.    Martha Valle [facility]

## 2019-01-07 ENCOUNTER — TELEPHONE (OUTPATIENT)
Dept: PHARMACY | Facility: HOSPITAL | Age: 59
End: 2019-01-07

## 2019-01-15 ENCOUNTER — OFFICE VISIT (OUTPATIENT)
Dept: CARDIOLOGY | Facility: CLINIC | Age: 59
End: 2019-01-15

## 2019-01-15 ENCOUNTER — HOSPITAL ENCOUNTER (OUTPATIENT)
Dept: CARDIOLOGY | Facility: HOSPITAL | Age: 59
Discharge: HOME | End: 2019-01-15
Attending: INTERNAL MEDICINE | Admitting: INTERNAL MEDICINE

## 2019-01-15 VITALS
HEIGHT: 69 IN | WEIGHT: 193 LBS | SYSTOLIC BLOOD PRESSURE: 102 MMHG | DIASTOLIC BLOOD PRESSURE: 64 MMHG | BODY MASS INDEX: 28.58 KG/M2 | HEART RATE: 53 BPM

## 2019-01-15 VITALS — HEART RATE: 69 BPM | WEIGHT: 180 LBS | BODY MASS INDEX: 26.66 KG/M2 | HEIGHT: 69 IN

## 2019-01-15 DIAGNOSIS — Z95.2 HX OF AORTIC VALVE REPLACEMENT, MECHANICAL: Primary | ICD-10-CM

## 2019-01-15 DIAGNOSIS — Z95.4 HISTORY OF AORTIC VALVE REPLACEMENT WITH METALLIC VALVE: ICD-10-CM

## 2019-01-15 LAB
BH CV ECHO MEAS - AO MAX PG (FULL): 11 MMHG
BH CV ECHO MEAS - AO MAX PG: 14.9 MMHG
BH CV ECHO MEAS - AO MEAN PG (FULL): 6.7 MMHG
BH CV ECHO MEAS - AO MEAN PG: 8.7 MMHG
BH CV ECHO MEAS - AO ROOT AREA (BSA CORRECTED): 1.7
BH CV ECHO MEAS - AO ROOT AREA: 9.2 CM^2
BH CV ECHO MEAS - AO ROOT DIAM: 3.4 CM
BH CV ECHO MEAS - AO V2 MAX: 192.7 CM/SEC
BH CV ECHO MEAS - AO V2 MEAN: 135.9 CM/SEC
BH CV ECHO MEAS - AO V2 VTI: 42.1 CM
BH CV ECHO MEAS - BSA(HAYCOCK): 2 M^2
BH CV ECHO MEAS - BSA: 2 M^2
BH CV ECHO MEAS - BZI_BMI: 26.6 KILOGRAMS/M^2
BH CV ECHO MEAS - BZI_METRIC_HEIGHT: 175.3 CM
BH CV ECHO MEAS - BZI_METRIC_WEIGHT: 81.6 KG
BH CV ECHO MEAS - EDV(MOD-SP2): 91 ML
BH CV ECHO MEAS - EDV(MOD-SP4): 98 ML
BH CV ECHO MEAS - EDV(TEICH): 169.1 ML
BH CV ECHO MEAS - EF(CUBED): 68.9 %
BH CV ECHO MEAS - EF(MOD-BP): 59 %
BH CV ECHO MEAS - EF(MOD-SP2): 58.2 %
BH CV ECHO MEAS - EF(MOD-SP4): 60.2 %
BH CV ECHO MEAS - EF(TEICH): 59.7 %
BH CV ECHO MEAS - ESV(MOD-SP2): 38 ML
BH CV ECHO MEAS - ESV(MOD-SP4): 39 ML
BH CV ECHO MEAS - ESV(TEICH): 68.2 ML
BH CV ECHO MEAS - FS: 32.2 %
BH CV ECHO MEAS - IVS/LVPW: 1
BH CV ECHO MEAS - IVSD: 1 CM
BH CV ECHO MEAS - LAT PEAK E' VEL: 10 CM/SEC
BH CV ECHO MEAS - LV DIASTOLIC VOL/BSA (35-75): 49.6 ML/M^2
BH CV ECHO MEAS - LV MASS(C)D: 247.2 GRAMS
BH CV ECHO MEAS - LV MASS(C)DI: 125.2 GRAMS/M^2
BH CV ECHO MEAS - LV MAX PG: 3.9 MMHG
BH CV ECHO MEAS - LV MEAN PG: 1.9 MMHG
BH CV ECHO MEAS - LV SYSTOLIC VOL/BSA (12-30): 19.7 ML/M^2
BH CV ECHO MEAS - LV V1 MAX: 98.5 CM/SEC
BH CV ECHO MEAS - LV V1 MEAN: 63.2 CM/SEC
BH CV ECHO MEAS - LV V1 VTI: 19.1 CM
BH CV ECHO MEAS - LVIDD: 5.8 CM
BH CV ECHO MEAS - LVIDS: 4 CM
BH CV ECHO MEAS - LVLD AP2: 6.7 CM
BH CV ECHO MEAS - LVLD AP4: 7 CM
BH CV ECHO MEAS - LVLS AP2: 5.9 CM
BH CV ECHO MEAS - LVLS AP4: 6.2 CM
BH CV ECHO MEAS - LVPWD: 1 CM
BH CV ECHO MEAS - MED PEAK E' VEL: 10 CM/SEC
BH CV ECHO MEAS - MV A DUR: 0.15 SEC
BH CV ECHO MEAS - MV A MAX VEL: 62.6 CM/SEC
BH CV ECHO MEAS - MV DEC SLOPE: 347.5 CM/SEC^2
BH CV ECHO MEAS - MV DEC TIME: 0.23 SEC
BH CV ECHO MEAS - MV E MAX VEL: 77.1 CM/SEC
BH CV ECHO MEAS - MV E/A: 1.2
BH CV ECHO MEAS - MV MAX PG: 2.8 MMHG
BH CV ECHO MEAS - MV MEAN PG: 1.5 MMHG
BH CV ECHO MEAS - MV P1/2T MAX VEL: 79.1 CM/SEC
BH CV ECHO MEAS - MV P1/2T: 66.6 MSEC
BH CV ECHO MEAS - MV V2 MAX: 84.2 CM/SEC
BH CV ECHO MEAS - MV V2 MEAN: 59.1 CM/SEC
BH CV ECHO MEAS - MV V2 VTI: 30.8 CM
BH CV ECHO MEAS - MVA P1/2T LCG: 2.8 CM^2
BH CV ECHO MEAS - MVA(P1/2T): 3.3 CM^2
BH CV ECHO MEAS - PA MAX PG (FULL): 3.6 MMHG
BH CV ECHO MEAS - PA MAX PG: 4.4 MMHG
BH CV ECHO MEAS - PA V2 MAX: 104.5 CM/SEC
BH CV ECHO MEAS - PI END-D VEL: 90.9 CM/SEC
BH CV ECHO MEAS - PULM A REVS DUR: 0.14 SEC
BH CV ECHO MEAS - PULM A REVS VEL: 35.2 CM/SEC
BH CV ECHO MEAS - PULM DIAS VEL: 51.9 CM/SEC
BH CV ECHO MEAS - PULM S/D: 1.1
BH CV ECHO MEAS - PULM SYS VEL: 59.5 CM/SEC
BH CV ECHO MEAS - PVA(V,A): 2.2 CM^2
BH CV ECHO MEAS - PVA(V,D): 2.2 CM^2
BH CV ECHO MEAS - RV MAX PG: 0.78 MMHG
BH CV ECHO MEAS - RV MEAN PG: 0.42 MMHG
BH CV ECHO MEAS - RV V1 MAX: 44.1 CM/SEC
BH CV ECHO MEAS - RV V1 MEAN: 29.9 CM/SEC
BH CV ECHO MEAS - RV V1 VTI: 9.3 CM
BH CV ECHO MEAS - RVOT AREA: 5.3 CM^2
BH CV ECHO MEAS - RVOT DIAM: 2.6 CM
BH CV ECHO MEAS - SI(AO): 195.7 ML/M^2
BH CV ECHO MEAS - SI(CUBED): 69.4 ML/M^2
BH CV ECHO MEAS - SI(MOD-SP2): 26.8 ML/M^2
BH CV ECHO MEAS - SI(MOD-SP4): 29.9 ML/M^2
BH CV ECHO MEAS - SI(TEICH): 51.1 ML/M^2
BH CV ECHO MEAS - SUP REN AO DIAM: 1.9 CM
BH CV ECHO MEAS - SV(AO): 386.5 ML
BH CV ECHO MEAS - SV(CUBED): 137.1 ML
BH CV ECHO MEAS - SV(MOD-SP2): 53 ML
BH CV ECHO MEAS - SV(MOD-SP4): 59 ML
BH CV ECHO MEAS - SV(RVOT): 48.8 ML
BH CV ECHO MEAS - SV(TEICH): 100.9 ML
BH CV ECHO MEAS - TAPSE (>1.6): 1.8 CM2
BH CV ECHO MEASUREMENTS AVERAGE E/E' RATIO: 7.71
BH CV XLRA - RV BASE: 3.4 CM
BH CV XLRA - TDI S': 11 CM/SEC
LEFT ATRIUM VOLUME INDEX: 25 ML/M2
LEFT ATRIUM VOLUME: 49 CM3
MAXIMAL PREDICTED HEART RATE: 163 BPM
STRESS TARGET HR: 139 BPM

## 2019-01-15 PROCEDURE — 99214 OFFICE O/P EST MOD 30 MIN: CPT | Performed by: INTERNAL MEDICINE

## 2019-01-15 PROCEDURE — 93306 TTE W/DOPPLER COMPLETE: CPT | Performed by: INTERNAL MEDICINE

## 2019-01-15 PROCEDURE — 93306 TTE W/DOPPLER COMPLETE: CPT

## 2019-01-15 PROCEDURE — 93000 ELECTROCARDIOGRAM COMPLETE: CPT | Performed by: INTERNAL MEDICINE

## 2019-01-15 NOTE — PROGRESS NOTES
Subjective:     Encounter Date:01/15/2019      Patient ID: Stew Cabral is a 58 y.o. male.    Chief Complaint:  History of Present Illness    This is a 57-year-old with a history of a bicuspid aortic valve resulting in critical aortic stenosis, status post mechanical aortic valve replacement with a 25 mm ATS valve in 1/2009, who presents for follow-up.     The patient was initially followed by Dr. Michelle Arenas for his aortic stenosis.  Around the time of his aortic valve surgery he was noted to have a nonischemic cardiomyopathy with an ejection fraction of 30%.  A repeat echocardiogram following his surgery showed normalization of his ejection fraction.  I began following him in 12/2013 when he presented to establish care.  I set him up for repeat echocardiogram at that time to continue show normal LV function and a normally functioning aortic valve.  He had a repeat echocardiogram in 5/2016 that continued to show normal LV function and a normal functioning aortic valve.  He initial checked his own INRs with a home monitor but became non-compliant for a period of time and was dropped by the home monitoring service.  At this point he was set up with the anticoagulation clinic.     Today he presents for annual follow-up with a repeat echocardiogram.  His echocardiogram was performed earlier today and shows normal left ventricular systolic function with an EF of 59%, normal diastolic function, normal functioning mechanical aortic valve.  Overall he reports that he's been feeling well.  He denies any chest pain, shortness of breath, PND orthopnea, near syncope or syncope, or palpitations.  He does report that he's been having more issues with ankle edema that he notes primarily at the end of the day when he takes his boots and socks off.  He does admit that he spends most of his stay on his feet walking around and concrete.  He also admits that he has began drinking more diet Dr Pepper over the last few  months.    Review of Systems   Constitution: Negative for weakness and malaise/fatigue.   HENT: Negative for hearing loss, hoarse voice, nosebleeds and sore throat.    Eyes: Negative for pain.   Cardiovascular: Positive for leg swelling. Negative for chest pain, claudication, cyanosis, dyspnea on exertion, irregular heartbeat, near-syncope, orthopnea, palpitations, paroxysmal nocturnal dyspnea and syncope.   Respiratory: Negative for shortness of breath and snoring.    Endocrine: Negative for cold intolerance, heat intolerance, polydipsia, polyphagia and polyuria.   Skin: Negative for itching and rash.   Musculoskeletal: Negative for arthritis, falls, joint pain, joint swelling, muscle cramps, muscle weakness and myalgias.   Gastrointestinal: Negative for constipation, diarrhea, dysphagia, heartburn, hematemesis, hematochezia, melena, nausea and vomiting.   Genitourinary: Negative for frequency, hematuria and hesitancy.   Neurological: Negative for excessive daytime sleepiness, dizziness, headaches, light-headedness and numbness.   Psychiatric/Behavioral: Negative for depression. The patient is not nervous/anxious.           Current Outpatient Medications:   •  warfarin (COUMADIN) 5 MG tablet, TAKE 1 TABLET BY MOUTH DAILY OR AS DIRECTED, Disp: 180 tablet, Rfl: 0  •  warfarin (COUMADIN) 5 MG tablet, TAKE 2 TABLETS BY MOUTH EVERY DAY, Disp: 180 tablet, Rfl: 0    Past Medical History:   Diagnosis Date   • ADD (attention deficit disorder)    • Aortic valvar stenosis    • Bicuspid aortic valve    • CHF (congestive heart failure) (CMS/[facility])    • Displacement of lumbar intervertebral disc      Past Surgical History:   Procedure Laterality Date   • AORTIC VALVE REPAIR/REPLACEMENT  2009   • CORONARY ANGIOPLASTY Left     AV stenosis   • CORONARY ARTERY BYPASS GRAFT       History reviewed. No pertinent family history.  Social History     Tobacco Use   • Smoking status: Current Every Day Smoker   Substance Use Topics   • Alcohol  "use: Yes     Alcohol/week: 0.0 - 0.6 oz     Frequency: Never   • Drug use: No           ECG 12 Lead  Date/Time: 1/15/2019 1:00 PM  Performed by: Kitty Lagunas MD  Authorized by: Kitty Lagunas MD   Comparison: compared with previous ECG   Similar to previous ECG  Rhythm: sinus rhythm  Comments: Non-specific inferolateral ST changes               Objective:         Visit Vitals  /64   Pulse 53   Ht 175.3 cm (69\")   Wt 87.5 kg (193 lb)   BMI 28.50 kg/m²          Physical Exam   Constitutional: He is oriented to person, place, and time. He appears well-developed and well-nourished.   HENT:   Head: Normocephalic and atraumatic.   Neck: No JVD present. Carotid bruit is not present.   Cardiovascular: Normal rate, regular rhythm, S1 normal and S2 normal. Exam reveals no gallop.   No murmur heard.  Pulses:       Radial pulses are 2+ on the right side, and 2+ on the left side.   1+ bilateral ankle edema   Pulmonary/Chest: Effort normal and breath sounds normal.   Abdominal: Soft. Normal appearance.   Neurological: He is alert and oriented to person, place, and time.   Skin: Skin is warm, dry and intact.   Psychiatric: He has a normal mood and affect.       Lab Review:       Assessment:          Diagnosis Plan   1. Hx of aortic valve replacement, mechanical [Z95.2]            Plan:       1.  Lower extremity swelling.  Echocardiogram today shows normal left ventricular systolic and diastolic functionsignificant valvular issues.  Suspect his edema is due to increased sodium intake with his soda consumption.  I advised him to cut back on his soda consumption.  I suspect with this and his edema will improve.  2.  History of mechanical aortic valve replacement.  Normal function on his echocardiogram today.  Continue routine monitoring of his INRs through anticoagulation clinic.    We'll plan on seeing the patient back again in one year or sooner if further issues arise.         "

## 2019-01-17 ENCOUNTER — ANTICOAGULATION VISIT (OUTPATIENT)
Dept: PHARMACY | Facility: HOSPITAL | Age: 59
End: 2019-01-17

## 2019-01-17 DIAGNOSIS — Z95.2 HX OF AORTIC VALVE REPLACEMENT, MECHANICAL: ICD-10-CM

## 2019-01-17 LAB
INR PPP: 2.7 (ref 0.91–1.09)
PROTHROMBIN TIME: 31.9 SECONDS (ref 10–13.8)

## 2019-01-17 PROCEDURE — 85610 PROTHROMBIN TIME: CPT

## 2019-01-17 PROCEDURE — 36416 COLLJ CAPILLARY BLOOD SPEC: CPT

## 2019-01-17 NOTE — PROGRESS NOTES
Anticoagulation Clinic Progress Note    Anticoagulation Summary  As of 2019    INR goal:   2.5-3.5   TTR:   9.6 % (3.9 mo)   INR used for dosin.7 (2019)   Warfarin maintenance plan:   10 mg on Mon, Fri; 7.5 mg all other days   Weekly warfarin total:   57.5 mg   No change documented:   Sue Woodruff   Plan last modified:   Martha Valle RPH (2018)   Next INR check:   2019   Priority:   Maintenance   Target end date:   Indefinite    Indications    Hx of aortic valve replacement  mechanical [Z95.2] [Z95.2]             Anticoagulation Episode Summary     INR check location:       Preferred lab:       Send INR reminders to:   MARIANNE BILLS [facility]    Comments:         Anticoagulation Care Providers     Provider Role Specialty Phone number    Kitty Lagunas MD Referring Cardiology [phone]          Clinic Interview:  Patient Findings     Negatives:   Signs/symptoms of thrombosis, Signs/symptoms of bleeding,   Laboratory test error suspected, Change in health, Change in alcohol use,   Change in activity, Upcoming invasive procedure, Emergency department   visit, Upcoming dental procedure, Missed doses, Extra doses, Change in   medications, Change in diet/appetite, Hospital admission, Bruising, Other   complaints      Clinical Outcomes     Negatives:   Major bleeding event, Thromboembolic event,   Anticoagulation-related hospital admission, Anticoagulation-related ED   visit, Anticoagulation-related fatality        INR History:  Anticoagulation Monitoring 2018   INR 3.8 4.7 2.7   INR Date 2018   INR Goal 2.5-3.5 2.5-3.5 2.5-3.5   Trend Same Down Same   Last Week Total 60 mg 60 mg 57.5 mg   Next Week Total 60 mg 52.5 mg 57.5 mg   Sun 7.5 mg 7.5 mg 7.5 mg   Mon 10 mg 10 mg 10 mg   Tue 7.5 mg 7.5 mg 7.5 mg   Wed 10 mg - 7.5 mg   Thu 7.5 mg 2.5 mg () 7.5 mg   Fri 10 mg 10 mg 10 mg   Sat 7.5 mg 7.5 mg 7.5 mg   Visit  Report - - -   Some recent data might be hidden       Plan:  1. INR is therapeutic today- see above in Anticoagulation Summary.   Will instruct Stew Cabral to continue their warfarin regimen- see above in Anticoagulation Summary.  2. Follow up in 4 weeks.  3. Patient declines warfarin refills.  4. Verbal and written information provided. Patient expresses understanding and has no further questions at this time.    Sue Woodruff

## 2019-02-14 ENCOUNTER — ANTICOAGULATION VISIT (OUTPATIENT)
Dept: PHARMACY | Facility: HOSPITAL | Age: 59
End: 2019-02-14

## 2019-02-14 DIAGNOSIS — Z95.2 HX OF AORTIC VALVE REPLACEMENT, MECHANICAL: ICD-10-CM

## 2019-02-14 LAB
INR PPP: 2.4 (ref 0.91–1.09)
PROTHROMBIN TIME: 28.8 SECONDS (ref 10–13.8)

## 2019-02-14 PROCEDURE — 85610 PROTHROMBIN TIME: CPT

## 2019-02-14 PROCEDURE — 36416 COLLJ CAPILLARY BLOOD SPEC: CPT

## 2019-02-14 NOTE — PROGRESS NOTES
Anticoagulation Clinic Progress Note    Anticoagulation Summary  As of 2019    INR goal:   2.5-3.5   TTR:   20.7 % (4.8 mo)   INR used for dosin.4! (2019)   Warfarin maintenance plan:   10 mg on Mon, Fri; 7.5 mg all other days   Weekly warfarin total:   57.5 mg   Plan last modified:   Martha Valle RPH (2018)   Next INR check:   2019   Priority:   Maintenance   Target end date:   Indefinite    Indications    Hx of aortic valve replacement  mechanical [Z95.2] [Z95.2]             Anticoagulation Episode Summary     INR check location:       Preferred lab:       Send INR reminders to:    MADAL[facility] BILLS [facility]    Comments:         Anticoagulation Care Providers     Provider Role Specialty Phone number    Kitty Lagunas MD Referring Cardiology [phone]          Clinic Interview:  Patient Findings     Negatives:   Signs/symptoms of thrombosis, Signs/symptoms of bleeding,   Laboratory test error suspected, Change in health, Change in alcohol use,   Change in activity, Upcoming invasive procedure, Emergency department   visit, Upcoming dental procedure, Missed doses, Extra doses, Change in   medications, Change in diet/appetite, Hospital admission, Bruising, Other   complaints      Clinical Outcomes     Negatives:   Major bleeding event, Thromboembolic event,   Anticoagulation-related hospital admission, Anticoagulation-related ED   visit, Anticoagulation-related fatality        INR History:  Anticoagulation Monitoring 2018   INR 4.7 2.7 2.4   INR Date 2018   INR Goal 2.5-3.5 2.5-3.5 2.5-3.5   Trend Down Same Same   Last Week Total 60 mg 57.5 mg 57.5 mg   Next Week Total 52.5 mg 57.5 mg 60 mg   Sun 7.5 mg 7.5 mg 7.5 mg   Mon 10 mg 10 mg 10 mg   Tue 7.5 mg 7.5 mg 7.5 mg   Wed - 7.5 mg 7.5 mg   Thu 2.5 mg () 7.5 mg 10 mg (); Otherwise 7.5 mg   Fri 10 mg 10 mg 10 mg   Sat 7.5 mg 7.5 mg 7.5 mg   Visit Report - - -   Some recent  data might be hidden       Plan:  1. INR is out of range today- see above in Anticoagulation Summary.   Will instruct Stew Cabral to continue their warfarin regimen- see above in Anticoagulation Summary.  2. Follow up in 2 weeks.  3. Patient declines warfarin refills.  4. Verbal and written information provided. Patient expresses understanding and has no further questions at this time.    Delmy Severino

## 2019-02-15 RX ORDER — WARFARIN SODIUM 5 MG/1
TABLET ORAL
Qty: 180 TABLET | Refills: 0 | Status: SHIPPED | OUTPATIENT
Start: 2019-02-15 | End: 2019-03-28 | Stop reason: SDUPTHER

## 2019-02-28 ENCOUNTER — ANTICOAGULATION VISIT (OUTPATIENT)
Dept: PHARMACY | Facility: HOSPITAL | Age: 59
End: 2019-02-28

## 2019-02-28 DIAGNOSIS — Z95.2 HX OF AORTIC VALVE REPLACEMENT, MECHANICAL: ICD-10-CM

## 2019-02-28 LAB
INR PPP: 3.2 (ref 0.91–1.09)
PROTHROMBIN TIME: 39 SECONDS (ref 10–13.8)

## 2019-02-28 PROCEDURE — 36416 COLLJ CAPILLARY BLOOD SPEC: CPT

## 2019-02-28 PROCEDURE — 85610 PROTHROMBIN TIME: CPT

## 2019-02-28 NOTE — PROGRESS NOTES
Anticoagulation Clinic Progress Note    Anticoagulation Summary  As of 2/28/2019    INR goal:   2.5-3.5   TTR:   26.6 % (5.3 mo)   INR used for dosing:   3.2 (2/28/2019)   Warfarin maintenance plan:   10 mg on Mon, Fri; 7.5 mg all other days   Weekly warfarin total:   57.5 mg   No change documented:   Delmy Severino   Plan last modified:   Martha Valle [facility] (12/20/2018)   Next INR check:   3/28/2019   Priority:   Maintenance   Target end date:   Indefinite    Indications    Hx of aortic valve replacement  mechanical [Z95.2] [Z95.2]             Anticoagulation Episode Summary     INR check location:       Preferred lab:       Send INR reminders to:   MARIAN[facility] BILLS [facility]    Comments:         Anticoagulation Care Providers     Provider Role Specialty Phone number    Kitty Lagunas MD Referring Cardiology [phone]          Clinic Interview:  Patient Findings     Negatives:   Signs/symptoms of thrombosis, Signs/symptoms of bleeding,   Laboratory test error suspected, Change in health, Change in alcohol use,   Change in activity, Upcoming invasive procedure, Emergency department   visit, Upcoming dental procedure, Missed doses, Extra doses, Change in   medications, Change in diet/appetite, Hospital admission, Bruising, Other   complaints      Clinical Outcomes     Negatives:   Major bleeding event, Thromboembolic event,   Anticoagulation-related hospital admission, Anticoagulation-related ED   visit, Anticoagulation-related fatality        INR History:  Anticoagulation Monitoring 1/17/2019 2/14/2019 2/28/2019   INR 2.7 2.4 3.2   INR Date 1/17/2019 2/14/2019 2/28/2019   INR Goal 2.5-3.5 2.5-3.5 2.5-3.5   Trend Same Same Same   Last Week Total 57.5 mg 57.5 mg 57.5 mg   Next Week Total 57.5 mg 60 mg 57.5 mg   Sun 7.5 mg 7.5 mg 7.5 mg   Mon 10 mg 10 mg 10 mg   Tue 7.5 mg 7.5 mg 7.5 mg   Wed 7.5 mg 7.5 mg 7.5 mg   Thu 7.5 mg 10 mg (2/14); Otherwise 7.5 mg 7.5 mg   Fri 10 mg 10 mg 10 mg   Sat 7.5 mg 7.5 mg  7.5 mg   Visit Report - - -   Some recent data might be hidden       Plan:  1. INR is therapeutic today- see above in Anticoagulation Summary.   Will instruct Stew GABINO Cabral to continue their warfarin regimen- see above in Anticoagulation Summary.  2. Follow up in 4 weeks.  3. Patient declines warfarin refills.  4. Verbal and written information provided. Patient expresses understanding and has no further questions at this time.    Delmy Severino

## 2019-03-28 ENCOUNTER — ANTICOAGULATION VISIT (OUTPATIENT)
Dept: PHARMACY | Facility: HOSPITAL | Age: 59
End: 2019-03-28

## 2019-03-28 DIAGNOSIS — Z95.2 HX OF AORTIC VALVE REPLACEMENT, MECHANICAL: ICD-10-CM

## 2019-03-28 LAB
INR PPP: 2.3 (ref 0.91–1.09)
PROTHROMBIN TIME: 27.5 SECONDS (ref 10–13.8)

## 2019-03-28 PROCEDURE — 36416 COLLJ CAPILLARY BLOOD SPEC: CPT

## 2019-03-28 PROCEDURE — 85610 PROTHROMBIN TIME: CPT

## 2019-03-28 RX ORDER — WARFARIN SODIUM 5 MG/1
10 TABLET ORAL DAILY
Qty: 180 TABLET | Refills: 0 | Status: SHIPPED | OUTPATIENT
Start: 2019-03-28 | End: 2019-07-24 | Stop reason: SDUPTHER

## 2019-03-28 NOTE — PROGRESS NOTES
Anticoagulation Clinic Progress Note    Anticoagulation Summary  As of 3/28/2019    INR goal:   2.5-3.5   TTR:   34.3 % (6.2 mo)   INR used for dosin.3! (3/28/2019)   Warfarin maintenance plan:   10 mg every Mon, Fri; 7.5 mg all other days   Weekly warfarin total:   57.5 mg   No change documented:   Aleta Sood   Plan last modified:   Martha Valle RPH (2018)   Next INR check:   2019   Priority:   Maintenance   Target end date:   Indefinite    Indications    Hx of aortic valve replacement  mechanical [Z95.2] [Z95.2]             Anticoagulation Episode Summary     INR check location:       Preferred lab:       Send INR reminders to:   MARIANNE BILLS [facility] PO[facility]    Comments:         Anticoagulation Care Providers     Provider Role Specialty Phone number    Kitty Lagunas MD Referring Cardiology [phone]          Clinic Interview:  Patient Findings     Negatives:   Signs/symptoms of thrombosis, Signs/symptoms of bleeding,   Laboratory test error suspected, Change in health, Change in alcohol use,   Change in activity, Upcoming invasive procedure, Emergency department   visit, Upcoming dental procedure, Missed doses, Extra doses, Change in   medications, Change in diet/appetite, Hospital admission, Bruising, Other   complaints      Clinical Outcomes     Negatives:   Major bleeding event, Thromboembolic event,   Anticoagulation-related hospital admission, Anticoagulation-related ED   visit, Anticoagulation-related fatality        INR History:  Anticoagulation Monitoring 2019 2019 3/28/2019   INR 2.4 3.2 2.3   INR Date 2019 2019 3/28/2019   INR Goal 2.5-3.5 2.5-3.5 2.5-3.5   Trend Same Same Same   Last Week Total 57.5 mg 57.5 mg 47.5 mg   Next Week Total 60 mg 57.5 mg 57.5 mg   Sun 7.5 mg 7.5 mg 7.5 mg   Mon 10 mg 10 mg 10 mg   Tue 7.5 mg 7.5 mg 7.5 mg   Wed 7.5 mg 7.5 mg 7.5 mg   Thu 10 mg (); Otherwise 7.5 mg 7.5 mg 7.5 mg   Fri 10 mg 10 mg 10 mg   Sat 7.5 mg 7.5 mg  7.5 mg   Visit Report - - -   Some recent data might be hidden       Plan:  1. INR is out of range- see above in Anticoagulation Summary.   Will instruct Stew GABINO Cabral to Continue  their warfarin regimen- see above in Anticoagulation Summary.  2. Follow up in 4 weeks.   3. Patient desires warfarin refills.  4. Verbal and written information provided. Patient expresses understanding and has no further questions at this time.    Aleta Sood

## 2019-05-23 ENCOUNTER — TELEPHONE (OUTPATIENT)
Dept: PHARMACY | Facility: HOSPITAL | Age: 59
End: 2019-05-23

## 2019-05-29 ENCOUNTER — ANTICOAGULATION VISIT (OUTPATIENT)
Dept: PHARMACY | Facility: HOSPITAL | Age: 59
End: 2019-05-29

## 2019-05-29 DIAGNOSIS — Z95.2 HX OF AORTIC VALVE REPLACEMENT, MECHANICAL: ICD-10-CM

## 2019-05-29 LAB
INR PPP: 2.8 (ref 0.91–1.09)
PROTHROMBIN TIME: 33.5 SECONDS (ref 10–13.8)

## 2019-05-29 PROCEDURE — 36416 COLLJ CAPILLARY BLOOD SPEC: CPT

## 2019-05-29 PROCEDURE — 85610 PROTHROMBIN TIME: CPT

## 2019-05-29 NOTE — PROGRESS NOTES
Anticoagulation Clinic Progress Note    Anticoagulation Summary  As of 2019    INR goal:   2.5-3.5   TTR:   40.7 % (8.3 mo)   INR used for dosin.8 (2019)   Warfarin maintenance plan:   10 mg every Mon, Fri; 7.5 mg all other days   Weekly warfarin total:   57.5 mg   No change documented:   Sue Woodruff   Plan last modified:   Martha Valle RPH (2018)   Next INR check:   2019   Priority:   Maintenance   Target end date:   Indefinite    Indications    Hx of aortic valve replacement  mechanical [Z95.2] [Z95.2]             Anticoagulation Episode Summary     INR check location:       Preferred lab:       Send INR reminders to:   MARIAN[facility] BILLS [facility] PO[facility]    Comments:         Anticoagulation Care Providers     Provider Role Specialty Phone number    Kitty Lagunas MD Referring Cardiology [phone]          Clinic Interview:  Patient Findings     Negatives:   Signs/symptoms of thrombosis, Signs/symptoms of bleeding,   Laboratory test error suspected, Change in health, Change in alcohol use,   Change in activity, Upcoming invasive procedure, Emergency department   visit, Upcoming dental procedure, Missed doses, Extra doses, Change in   medications, Change in diet/appetite, Hospital admission, Bruising, Other   complaints      Clinical Outcomes     Negatives:   Major bleeding event, Thromboembolic event,   Anticoagulation-related hospital admission, Anticoagulation-related ED   visit, Anticoagulation-related fatality        INR History:  Anticoagulation Monitoring 2019 3/28/2019 2019   INR 3.2 2.3 2.8   INR Date 2019 3/28/2019 2019   INR Goal 2.5-3.5 2.5-3.5 2.5-3.5   Trend Same Same Same   Last Week Total 57.5 mg 47.5 mg 57.5 mg   Next Week Total 57.5 mg 57.5 mg 57.5 mg   Sun 7.5 mg 7.5 mg 7.5 mg   Mon 10 mg 10 mg 10 mg   Tue 7.5 mg 7.5 mg 7.5 mg   Wed 7.5 mg 7.5 mg 7.5 mg   Thu 7.5 mg 7.5 mg 7.5 mg   Fri 10 mg 10 mg 10 mg   Sat 7.5 mg 7.5 mg 7.5 mg   Visit  Report - - -   Some recent data might be hidden       Plan:  1. INR is therapeutic today- see above in Anticoagulation Summary.   Will instruct Stew Cabral to continue their warfarin regimen- see above in Anticoagulation Summary.  2. Follow up in 4 weeks.  3. Patient declines warfarin refills.  4. Verbal and written information provided. Patient expresses understanding and has no further questions at this time.    Sue Woodruff

## 2019-07-09 ENCOUNTER — TELEPHONE (OUTPATIENT)
Dept: PHARMACY | Facility: HOSPITAL | Age: 59
End: 2019-07-09

## 2019-07-12 ENCOUNTER — TELEPHONE (OUTPATIENT)
Dept: PHARMACY | Facility: HOSPITAL | Age: 59
End: 2019-07-12

## 2019-07-24 ENCOUNTER — ANTICOAGULATION VISIT (OUTPATIENT)
Dept: PHARMACY | Facility: HOSPITAL | Age: 59
End: 2019-07-24

## 2019-07-24 DIAGNOSIS — Z95.2 HX OF AORTIC VALVE REPLACEMENT, MECHANICAL: ICD-10-CM

## 2019-07-24 LAB
INR PPP: 3.5 (ref 0.91–1.09)
PROTHROMBIN TIME: 41.5 SECONDS (ref 10–13.8)

## 2019-07-24 PROCEDURE — 36416 COLLJ CAPILLARY BLOOD SPEC: CPT

## 2019-07-24 PROCEDURE — 85610 PROTHROMBIN TIME: CPT

## 2019-07-24 RX ORDER — WARFARIN SODIUM 5 MG/1
10 TABLET ORAL DAILY
Qty: 180 TABLET | Refills: 0 | Status: SHIPPED | OUTPATIENT
Start: 2019-07-24 | End: 2019-11-21 | Stop reason: SDUPTHER

## 2019-07-24 NOTE — PROGRESS NOTES
Anticoagulation Clinic Progress Note    Anticoagulation Summary  As of 7/24/2019    INR goal:   2.5-3.5   TTR:   51.6 % (10.1 mo)   INR used for dosing:   3.5 (7/24/2019)   Warfarin maintenance plan:   10 mg every Mon, Fri; 7.5 mg all other days   Weekly warfarin total:   57.5 mg   No change documented:   Delmy Severino   Plan last modified:   Martha Valle [facility] (12/20/2018)   Next INR check:   8/21/2019   Priority:   Maintenance   Target end date:   Indefinite    Indications    Hx of aortic valve replacement  mechanical [Z95.2] [Z95.2]             Anticoagulation Episode Summary     INR check location:       Preferred lab:       Send INR reminders to:    M[facility]AL[facility] BILLS [facility] [place]    Comments:         Anticoagulation Care Providers     Provider Role Specialty Phone number    Kitty Lagunas MD Referring Cardiology [phone]          Clinic Interview:      INR History:  Anticoagulation Monitoring 3/28/2019 5/29/2019 7/24/2019   INR 2.3 2.8 3.5   INR Date 3/28/2019 5/29/2019 7/24/2019   INR Goal 2.5-3.5 2.5-3.5 2.5-3.5   Trend Same Same Same   Last Week Total 47.5 mg 57.5 mg 57.5 mg   Next Week Total 57.5 mg 57.5 mg 57.5 mg   Sun 7.5 mg 7.5 mg 7.5 mg   Mon 10 mg 10 mg 10 mg   Tue 7.5 mg 7.5 mg 7.5 mg   Wed 7.5 mg 7.5 mg 7.5 mg   Thu 7.5 mg 7.5 mg 7.5 mg   Fri 10 mg 10 mg 10 mg   Sat 7.5 mg 7.5 mg 7.5 mg   Visit Report - - -   Some recent data might be hidden       Plan:  1. INR is therapeutic today- see above in Anticoagulation Summary.   Will instruct Stew Cabral to continue their warfarin regimen- see above in Anticoagulation Summary.  2. Follow up in 4 weeks.  3. Patient desires warfarin refills.  4. Verbal and written information provided. Patient expresses understanding and has no further questions at this time.    Delmy Severino

## 2019-09-23 ENCOUNTER — TELEPHONE (OUTPATIENT)
Dept: PHARMACY | Facility: HOSPITAL | Age: 59
End: 2019-09-23

## 2019-10-03 ENCOUNTER — ANTICOAGULATION VISIT (OUTPATIENT)
Dept: PHARMACY | Facility: HOSPITAL | Age: 59
End: 2019-10-03

## 2019-10-03 DIAGNOSIS — Z95.2 HX OF AORTIC VALVE REPLACEMENT, MECHANICAL: ICD-10-CM

## 2019-10-03 LAB
INR PPP: 3.7 (ref 0.91–1.09)
PROTHROMBIN TIME: 44.6 SECONDS (ref 10–13.8)

## 2019-10-03 PROCEDURE — G0463 HOSPITAL OUTPT CLINIC VISIT: HCPCS

## 2019-10-03 PROCEDURE — 85610 PROTHROMBIN TIME: CPT

## 2019-10-03 PROCEDURE — 36416 COLLJ CAPILLARY BLOOD SPEC: CPT

## 2019-10-03 NOTE — PROGRESS NOTES
Anticoagulation Clinic Progress Note    Anticoagulation Summary  As of 10/3/2019    INR goal:   2.5-3.5   TTR:   41.8 % (1 y)   INR used for dosing:   3.7! (10/3/2019)   Warfarin maintenance plan:   10 mg every Mon, Fri; 7.5 mg all other days   Weekly warfarin total:   57.5 mg   No change documented:   Gael Turner RPH   Plan last modified:   Martha Valle RPH (12/20/2018)   Next INR check:   10/17/2019   Priority:   Maintenance   Target end date:   Indefinite    Indications    Hx of aortic valve replacement  mechanical [Z95.2] [Z95.2]             Anticoagulation Episode Summary     INR check location:       Preferred lab:       Send INR reminders to:   [facility] [place]    Comments:         Anticoagulation Care Providers     Provider Role Specialty Phone number    Kitty Lagunas MD Referring Cardiology [phone]          Clinic Interview:  Patient Findings     Positives:   Extra doses    Negatives:   Signs/symptoms of thrombosis, Signs/symptoms of bleeding,   Laboratory test error suspected, Change in health, Change in alcohol use,   Change in activity, Upcoming invasive procedure, Emergency department   visit, Upcoming dental procedure, Missed doses, Change in medications,   Change in diet/appetite, Hospital admission, Bruising, Other complaints    Comments:   Believes he may have accidentally taken 10 mg on a 7.5 mg   day.      Clinical Outcomes     Negatives:   Major bleeding event, Thromboembolic event,   Anticoagulation-related hospital admission, Anticoagulation-related ED   visit, Anticoagulation-related fatality    Comments:   Believes he may have accidentally taken 10 mg on a 7.5 mg   day.        INR History:  Anticoagulation Monitoring 5/29/2019 7/24/2019 10/3/2019   INR 2.8 3.5 3.7   INR Date 5/29/2019 7/24/2019 10/3/2019   INR Goal 2.5-3.5 2.5-3.5 2.5-3.5   Trend Same Same Same   Last Week Total 57.5 mg 57.5 mg 57.5 mg   Next Week Total 57.5 mg 57.5 mg 57.5 mg   Sun 7.5 mg 7.5 mg 7.5  mg   Mon 10 mg 10 mg 10 mg   Tue 7.5 mg 7.5 mg 7.5 mg   Wed 7.5 mg 7.5 mg 7.5 mg   Thu 7.5 mg 7.5 mg 7.5 mg   Fri 10 mg 10 mg 10 mg   Sat 7.5 mg 7.5 mg 7.5 mg   Visit Report - - -   Some recent data might be hidden       Plan:  1. INR is Supratherapeutic today- see above in Anticoagulation Summary.  Will instruct Stew Cabral to Change their warfarin regimen- see above in Anticoagulation Summary.  2. Follow up in 2 weeks  3. Patient declines warfarin refills.  4. Verbal and written information provided. Patient expresses understanding and has no further questions at this time.    Gael Turner [facility]

## 2019-11-01 ENCOUNTER — TELEPHONE (OUTPATIENT)
Dept: PHARMACY | Facility: HOSPITAL | Age: 59
End: 2019-11-01

## 2019-11-21 ENCOUNTER — ANTICOAGULATION VISIT (OUTPATIENT)
Dept: PHARMACY | Facility: HOSPITAL | Age: 59
End: 2019-11-21

## 2019-11-21 DIAGNOSIS — Z95.2 HX OF AORTIC VALVE REPLACEMENT, MECHANICAL: ICD-10-CM

## 2019-11-21 LAB
INR PPP: 3.9 (ref 0.91–1.09)
PROTHROMBIN TIME: 46.9 SECONDS (ref 10–13.8)

## 2019-11-21 PROCEDURE — 36416 COLLJ CAPILLARY BLOOD SPEC: CPT

## 2019-11-21 PROCEDURE — 85610 PROTHROMBIN TIME: CPT

## 2019-11-21 PROCEDURE — G0463 HOSPITAL OUTPT CLINIC VISIT: HCPCS

## 2019-11-21 RX ORDER — WARFARIN SODIUM 5 MG/1
TABLET ORAL
Qty: 165 TABLET | Refills: 0 | Status: SHIPPED | OUTPATIENT
Start: 2019-11-21 | End: 2020-01-16 | Stop reason: SDUPTHER

## 2019-11-21 NOTE — PROGRESS NOTES
Anticoagulation Clinic Progress Note    Anticoagulation Summary  As of 11/21/2019    INR goal:   2.5-3.5   TTR:   37.0 % (1.2 y)   INR used for dosing:   3.9! (11/21/2019)   Warfarin maintenance plan:   10 mg every Mon, Fri; 7.5 mg all other days   Weekly warfarin total:   57.5 mg   Plan last modified:   Martha Valle RPH (12/20/2018)   Next INR check:   12/18/2019   Priority:   Maintenance   Target end date:   Indefinite    Indications    Hx of aortic valve replacement  mechanical [Z95.2] [Z95.2]             Anticoagulation Episode Summary     INR check location:       Preferred lab:       Send INR reminders to:    [facility]    Comments:         Anticoagulation Care Providers     Provider Role Specialty Phone number    Kitty Lagunas MD Referring Cardiology [phone]          Clinic Interview:      INR History:  Anticoagulation Monitoring 7/24/2019 10/3/2019 11/21/2019   INR 3.5 3.7 3.9   INR Date 7/24/2019 10/3/2019 11/21/2019   INR Goal 2.5-3.5 2.5-3.5 2.5-3.5   Trend Same Same Same   Last Week Total 57.5 mg 57.5 mg 57.5 mg   Next Week Total 57.5 mg 57.5 mg 55 mg   Sun 7.5 mg 7.5 mg 7.5 mg   Mon 10 mg 10 mg 10 mg   Tue 7.5 mg 7.5 mg 7.5 mg   Wed 7.5 mg 7.5 mg 7.5 mg   Thu 7.5 mg 7.5 mg 5 mg (11/21); Otherwise 7.5 mg   Fri 10 mg 10 mg 10 mg   Sat 7.5 mg 7.5 mg 7.5 mg   Visit Report - - -   Some recent data might be hidden       Plan:  1. INR is Supratherapeutic today- see above in Anticoagulation Summary.  Will instruct Stew Cabral to Continue their warfarin regimen- see above in Anticoagulation Summary.  2. Follow up in 1 month  3. Patient desires warfarin refills.  4. Verbal and written information provided. Patient expresses understanding and has no further questions at this time.    Martha Valle RPH

## 2020-01-08 ENCOUNTER — ANTICOAGULATION VISIT (OUTPATIENT)
Dept: PHARMACY | Facility: HOSPITAL | Age: 60
End: 2020-01-08

## 2020-01-08 DIAGNOSIS — Z95.2 HX OF AORTIC VALVE REPLACEMENT, MECHANICAL: ICD-10-CM

## 2020-01-08 LAB
INR PPP: 1.8 (ref 0.91–1.09)
PROTHROMBIN TIME: 21.9 SECONDS (ref 10–13.8)

## 2020-01-08 PROCEDURE — G0463 HOSPITAL OUTPT CLINIC VISIT: HCPCS

## 2020-01-08 PROCEDURE — 36416 COLLJ CAPILLARY BLOOD SPEC: CPT

## 2020-01-08 PROCEDURE — 85610 PROTHROMBIN TIME: CPT

## 2020-01-08 NOTE — PROGRESS NOTES
Anticoagulation Clinic Progress Note    Anticoagulation Summary  As of 2020    INR goal:   2.5-3.5   TTR:   38.1 % (1.3 y)   INR used for dosin.8! (2020)   Warfarin maintenance plan:   10 mg every Mon, Fri; 7.5 mg all other days   Weekly warfarin total:   57.5 mg   Plan last modified:   Martha Valle RPH (2018)   Next INR check:   2020   Priority:   Maintenance   Target end date:   Indefinite    Indications    Hx of aortic valve replacement  mechanical [Z95.2] [Z95.2]             Anticoagulation Episode Summary     INR check location:       Preferred lab:       Send INR reminders to:    [facility]    Comments:         Anticoagulation Care Providers     Provider Role Specialty Phone number    Kitty Lagunas MD Referring Cardiology [phone]          Clinic Interview:  Patient Findings     Positives:   Missed doses, Change in diet/appetite, Other complaints    Negatives:   Signs/symptoms of thrombosis, Signs/symptoms of bleeding,   Laboratory test error suspected, Change in health, Change in alcohol use,   Change in activity, Upcoming invasive procedure, Emergency department   visit, Upcoming dental procedure, Extra doses, Change in medications,   Hospital admission, Bruising    Comments:   Reports missing a couple doses while off work for holidays.   Reports intention to have several drinks today w/ friends; advised limit   of 1-2 drinks/day.      Clinical Outcomes     Negatives:   Major bleeding event, Thromboembolic event,   Anticoagulation-related hospital admission, Anticoagulation-related ED   visit, Anticoagulation-related fatality    Comments:   Reports missing a couple doses while off work for holidays.   Reports intention to have several drinks today w/ friends; advised limit   of 1-2 drinks/day.        INR History:  Anticoagulation Monitoring 10/3/2019 2019 2020   INR 3.7 3.9 1.8   INR Date 10/3/2019 2019 2020   INR Goal 2.5-3.5 2.5-3.5  2.5-3.5   Trend Same Same Same   Last Week Total 57.5 mg 57.5 mg 57.5 mg   Next Week Total 57.5 mg 55 mg 62.5 mg   Sun 7.5 mg 7.5 mg 7.5 mg   Mon 10 mg 10 mg 10 mg   Tue 7.5 mg 7.5 mg 7.5 mg   Wed 7.5 mg 7.5 mg 12.5 mg (1/8); Otherwise 7.5 mg   Thu 7.5 mg 5 mg (11/21); Otherwise 7.5 mg 7.5 mg   Fri 10 mg 10 mg 10 mg   Sat 7.5 mg 7.5 mg 7.5 mg   Visit Report - - -   Some recent data might be hidden       Plan:  1. INR is Subtherapeutic today- see above in Anticoagulation Summary.  Will instruct Stew Cabral to Change their warfarin regimen- see above in Anticoagulation Summary.  2. Follow up in 1 week  3. Patient declines warfarin refills.  4. Verbal and written information provided. To seek immediate medical attention if s/sx of CVA develop. Patient expresses understanding and has no further questions at this time.    Gael Turner [facility]

## 2020-01-16 ENCOUNTER — ANTICOAGULATION VISIT (OUTPATIENT)
Dept: PHARMACY | Facility: HOSPITAL | Age: 60
End: 2020-01-16

## 2020-01-16 DIAGNOSIS — Z95.2 HX OF AORTIC VALVE REPLACEMENT, MECHANICAL: ICD-10-CM

## 2020-01-16 LAB
INR PPP: 2.9 (ref 0.91–1.09)
PROTHROMBIN TIME: 34.8 SECONDS (ref 10–13.8)

## 2020-01-16 PROCEDURE — 85610 PROTHROMBIN TIME: CPT

## 2020-01-16 PROCEDURE — 36416 COLLJ CAPILLARY BLOOD SPEC: CPT

## 2020-01-16 RX ORDER — WARFARIN SODIUM 5 MG/1
TABLET ORAL
Qty: 150 TABLET | Refills: 0 | Status: SHIPPED | OUTPATIENT
Start: 2020-01-16 | End: 2020-05-01 | Stop reason: SDUPTHER

## 2020-01-16 NOTE — PROGRESS NOTES
I have reviewed the notes, assessments, and/or procedures performed by Aleta Sood, I concur with her/his documentation of Stew Cabral. Pt refused sooner f/u than 4 wks despite 2 weeks being recommended.

## 2020-01-16 NOTE — PROGRESS NOTES
Anticoagulation Clinic Progress Note    Anticoagulation Summary  As of 2020    INR goal:   2.5-3.5   TTR:   38.0 % (1.3 y)   INR used for dosin.9 (2020)   Warfarin maintenance plan:   10 mg every Mon, Fri; 7.5 mg all other days   Weekly warfarin total:   57.5 mg   No change documented:   Aleta Sood   Plan last modified:   Martha Valle RPH (2018)   Next INR check:   2020   Priority:   Maintenance   Target end date:   Indefinite    Indications    Hx of aortic valve replacement  mechanical [Z95.2] [Z95.2]             Anticoagulation Episode Summary     INR check location:       Preferred lab:       Send INR reminders to:   [facility]    Comments:         Anticoagulation Care Providers     Provider Role Specialty Phone number    Kitty Lagunas MD Referring Cardiology [phone]          Clinic Interview:  Patient Findings     Negatives:   Signs/symptoms of thrombosis, Signs/symptoms of bleeding,   Laboratory test error suspected, Change in health, Change in alcohol use,   Change in activity, Upcoming invasive procedure, Emergency department   visit, Upcoming dental procedure, Missed doses, Extra doses, Change in   medications, Change in diet/appetite, Hospital admission, Bruising, Other   complaints      Clinical Outcomes     Negatives:   Major bleeding event, Thromboembolic event,   Anticoagulation-related hospital admission, Anticoagulation-related ED   visit, Anticoagulation-related fatality        INR History:  Anticoagulation Monitoring 2019   INR 3.9 1.8 2.9   INR Date 2019   INR Goal 2.5-3.5 2.5-3.5 2.5-3.5   Trend Same Same Same   Last Week Total 57.5 mg 57.5 mg 57.5 mg   Next Week Total 55 mg 62.5 mg 57.5 mg   Sun 7.5 mg 7.5 mg 7.5 mg   Mon 10 mg 10 mg 10 mg   Tue 7.5 mg 7.5 mg 7.5 mg   Wed 7.5 mg 12.5 mg (); Otherwise 7.5 mg 7.5 mg   Thu 5 mg (); Otherwise 7.5 mg 7.5 mg 7.5 mg   Fri 10 mg 10 mg 10  mg   Sat 7.5 mg 7.5 mg 7.5 mg   Visit Report - - -   Some recent data might be hidden       Plan:  1. INR is therapeutic today- see above in Anticoagulation Summary.   Will instruct Stew GABINO Cabral to continue their warfarin regimen- see above in Anticoagulation Summary.  2. Follow up in 4 weeks.  3. Patient declines warfarin refills.  4. Verbal and written information provided. Patient expresses understanding and has no further questions at this time.    Aleta Sood

## 2020-02-26 ENCOUNTER — HOSPITAL ENCOUNTER (EMERGENCY)
Facility: HOSPITAL | Age: 60
Discharge: HOME | End: 2020-02-26
Attending: EMERGENCY MEDICINE | Admitting: EMERGENCY MEDICINE

## 2020-02-26 ENCOUNTER — APPOINTMENT (OUTPATIENT)
Dept: GENERAL RADIOLOGY | Facility: HOSPITAL | Age: 60
End: 2020-02-26

## 2020-02-26 VITALS
RESPIRATION RATE: 18 BRPM | HEIGHT: 69 IN | DIASTOLIC BLOOD PRESSURE: 78 MMHG | TEMPERATURE: 96.9 F | HEART RATE: 76 BPM | OXYGEN SATURATION: 98 % | WEIGHT: 180 LBS | BODY MASS INDEX: 26.66 KG/M2 | SYSTOLIC BLOOD PRESSURE: 134 MMHG

## 2020-02-26 DIAGNOSIS — M43.6 TORTICOLLIS, ACUTE: Primary | ICD-10-CM

## 2020-02-26 LAB
HOLD SPECIMEN: NORMAL
HOLD SPECIMEN: NORMAL
INR PPP: 2.81 (ref 0.9–1.1)
PROTHROMBIN TIME: 29.3 SECONDS (ref 11.7–14.2)
WHOLE BLOOD HOLD SPECIMEN: NORMAL
WHOLE BLOOD HOLD SPECIMEN: NORMAL

## 2020-02-26 PROCEDURE — 85610 PROTHROMBIN TIME: CPT | Performed by: EMERGENCY MEDICINE

## 2020-02-26 PROCEDURE — 99284 EMERGENCY DEPT VISIT MOD MDM: CPT

## 2020-02-26 PROCEDURE — 36415 COLL VENOUS BLD VENIPUNCTURE: CPT

## 2020-02-26 PROCEDURE — 99283 EMERGENCY DEPT VISIT LOW MDM: CPT

## 2020-02-26 PROCEDURE — 72050 X-RAY EXAM NECK SPINE 4/5VWS: CPT

## 2020-02-26 RX ORDER — CYCLOBENZAPRINE HCL 10 MG
10 TABLET ORAL ONCE
Status: COMPLETED | OUTPATIENT
Start: 2020-02-26 | End: 2020-02-26

## 2020-02-26 RX ORDER — HYDROCODONE BITARTRATE AND ACETAMINOPHEN 5; 325 MG/1; MG/1
1 TABLET ORAL EVERY 6 HOURS PRN
Qty: 8 TABLET | Refills: 0 | Status: SHIPPED | OUTPATIENT
Start: 2020-02-26 | End: 2021-01-07 | Stop reason: ALTCHOICE

## 2020-02-26 RX ORDER — HYDROCODONE BITARTRATE AND ACETAMINOPHEN 7.5; 325 MG/1; MG/1
1 TABLET ORAL ONCE
Status: COMPLETED | OUTPATIENT
Start: 2020-02-26 | End: 2020-02-26

## 2020-02-26 RX ORDER — CYCLOBENZAPRINE HCL 10 MG
10 TABLET ORAL 3 TIMES DAILY PRN
Qty: 15 TABLET | Refills: 0 | Status: SHIPPED | OUTPATIENT
Start: 2020-02-26 | End: 2021-01-07 | Stop reason: ALTCHOICE

## 2020-02-26 RX ADMIN — CYCLOBENZAPRINE 10 MG: 10 TABLET, FILM COATED ORAL at 14:43

## 2020-02-26 RX ADMIN — HYDROCODONE BITARTRATE AND ACETAMINOPHEN 1 TABLET: 7.5; 325 TABLET ORAL at 14:43

## 2020-03-04 ENCOUNTER — TELEPHONE (OUTPATIENT)
Dept: PHARMACY | Facility: HOSPITAL | Age: 60
End: 2020-03-04

## 2020-03-11 ENCOUNTER — TELEPHONE (OUTPATIENT)
Dept: PHARMACY | Facility: HOSPITAL | Age: 60
End: 2020-03-11

## 2020-03-12 ENCOUNTER — ANTICOAGULATION VISIT (OUTPATIENT)
Dept: PHARMACY | Facility: HOSPITAL | Age: 60
End: 2020-03-12

## 2020-03-12 DIAGNOSIS — Z95.2 HX OF AORTIC VALVE REPLACEMENT, MECHANICAL: ICD-10-CM

## 2020-03-12 LAB
INR PPP: 3.1 (ref 0.91–1.09)
PROTHROMBIN TIME: 36.6 SECONDS (ref 10–13.8)

## 2020-03-12 PROCEDURE — 85610 PROTHROMBIN TIME: CPT

## 2020-03-12 PROCEDURE — 36416 COLLJ CAPILLARY BLOOD SPEC: CPT

## 2020-03-12 NOTE — PROGRESS NOTES
Anticoagulation Clinic Progress Note    Anticoagulation Summary  As of 3/12/2020    INR goal:   2.5-3.5   TTR:   44.5 % (1.5 y)   INR used for dosing:   3.1 (3/12/2020)   Warfarin maintenance plan:   10 mg every Mon, Fri; 7.5 mg all other days   Weekly warfarin total:   57.5 mg   No change documented:   Aleta Sood   Plan last modified:   Martha Valle RPH (12/20/2018)   Next INR check:   4/9/2020   Priority:   Maintenance   Target end date:   Indefinite    Indications    Hx of aortic valve replacement  mechanical [Z95.2] [Z95.2]             Anticoagulation Episode Summary     INR check location:       Preferred lab:       Send INR reminders to:   [facility]    Comments:         Anticoagulation Care Providers     Provider Role Specialty Phone number    Kitty Lagunas MD Referring Cardiology [phone]          Clinic Interview:  Patient Findings     Negatives:   Signs/symptoms of thrombosis, Signs/symptoms of bleeding,   Laboratory test error suspected, Change in health, Change in alcohol use,   Change in activity, Upcoming invasive procedure, Emergency department   visit, Upcoming dental procedure, Missed doses, Extra doses, Change in   medications, Change in diet/appetite, Hospital admission, Bruising, Other   complaints      Clinical Outcomes     Negatives:   Major bleeding event, Thromboembolic event,   Anticoagulation-related hospital admission, Anticoagulation-related ED   visit, Anticoagulation-related fatality        INR History:  Anticoagulation Monitoring 1/8/2020 1/16/2020 3/12/2020   INR 1.8 2.9 3.1   INR Date 1/8/2020 1/16/2020 3/12/2020   INR Goal 2.5-3.5 2.5-3.5 2.5-3.5   Trend Same Same Same   Last Week Total 57.5 mg 57.5 mg 57.5 mg   Next Week Total 62.5 mg 57.5 mg 57.5 mg   Sun 7.5 mg 7.5 mg 7.5 mg   Mon 10 mg 10 mg 10 mg   Tue 7.5 mg 7.5 mg 7.5 mg   Wed 12.5 mg (1/8); Otherwise 7.5 mg 7.5 mg 7.5 mg   Thu 7.5 mg 7.5 mg 7.5 mg   Fri 10 mg 10 mg 10 mg   Sat 7.5 mg 7.5 mg  7.5 mg   Visit Report - - -   Some recent data might be hidden       Plan:  1. INR is therapeutic today- see above in Anticoagulation Summary.   Will instruct Stew GABINO Cabral to continue their warfarin regimen- see above in Anticoagulation Summary.  2. Follow up in 4 weeks.  3. Patient declines warfarin refills.  4. Verbal and written information provided. Patient expresses understanding and has no further questions at this time.    Aleta Sood

## 2020-05-01 ENCOUNTER — ANTICOAGULATION VISIT (OUTPATIENT)
Dept: PHARMACY | Facility: HOSPITAL | Age: 60
End: 2020-05-01

## 2020-05-01 DIAGNOSIS — Z95.2 HX OF AORTIC VALVE REPLACEMENT, MECHANICAL: ICD-10-CM

## 2020-05-01 LAB
INR PPP: 2 (ref 0.91–1.09)
PROTHROMBIN TIME: 23.6 SECONDS (ref 10–13.8)

## 2020-05-01 PROCEDURE — G0463 HOSPITAL OUTPT CLINIC VISIT: HCPCS

## 2020-05-01 PROCEDURE — 36416 COLLJ CAPILLARY BLOOD SPEC: CPT

## 2020-05-01 PROCEDURE — 85610 PROTHROMBIN TIME: CPT

## 2020-05-01 RX ORDER — WARFARIN SODIUM 5 MG/1
TABLET ORAL
Qty: 150 TABLET | Refills: 1 | Status: SHIPPED | OUTPATIENT
Start: 2020-05-01 | End: 2020-08-26 | Stop reason: SDUPTHER

## 2020-05-01 NOTE — PROGRESS NOTES
Anticoagulation Clinic Progress Note    Anticoagulation Summary  As of 2020    INR goal:   2.5-3.5   TTR:   45.4 % (1.6 y)   INR used for dosin.0! (2020)   Warfarin maintenance plan:   10 mg every Mon, Fri; 7.5 mg all other days   Weekly warfarin total:   57.5 mg   Plan last modified:   Martha Valle RPH (2018)   Next INR check:   2020   Priority:   Maintenance   Target end date:   Indefinite    Indications    Hx of aortic valve replacement  mechanical [Z95.2] [Z95.2]             Anticoagulation Episode Summary     INR check location:       Preferred lab:       Send INR reminders to:    [facility] [place]    Comments:         Anticoagulation Care Providers     Provider Role Specialty Phone number    Kitty Lagunas MD Referring Cardiology [phone]          Clinic Interview:      INR History:  Anticoagulation Monitoring 2020 3/12/2020 2020   INR 2.9 3.1 2.0   INR Date 2020 3/12/2020 2020   INR Goal 2.5-3.5 2.5-3.5 2.5-3.5   Trend Same Same Same   Last Week Total 57.5 mg 57.5 mg 57.5 mg   Next Week Total 57.5 mg 57.5 mg 60 mg   Sun 7.5 mg 7.5 mg 7.5 mg   Mon 10 mg 10 mg 10 mg   Tue 7.5 mg 7.5 mg 7.5 mg   Wed 7.5 mg 7.5 mg 7.5 mg   Thu 7.5 mg 7.5 mg 7.5 mg   Fri 10 mg 10 mg 10 mg   Sat 7.5 mg 7.5 mg 10 mg (); Otherwise 7.5 mg   Visit Report - - -   Some recent data might be hidden       Plan:  1. INR is Subtherapeutic today- see above in Anticoagulation Summary.  Will instruct Stew Cabral to boost warfarin to 10mg tomorrow then continue their warfarin regimen- see above in Anticoagulation Summary.  2. Follow up in 3 weeks  3. Patient [facility]ills[facility]  4. Verbal and written information provided. Patient expresses understanding and has no further questions at this time.    Martha Valle RPH

## 2020-05-14 ENCOUNTER — TELEPHONE (OUTPATIENT)
Dept: CARDIOLOGY | Facility: CLINIC | Age: 60
End: 2020-05-14

## 2020-05-14 NOTE — TELEPHONE ENCOUNTER
Dr. Lagunas -   Patient called today asking your opinion on his risk of COVID-19.  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    Please advise.  Thanks!      Monico -  Patient was due for f/u with Dr. Lagunas in January.  Can you please call him to schedule f/u.    He can be reached at [phone].    Thank you!

## 2020-05-26 NOTE — TELEPHONE ENCOUNTER
I have tried to reach patient several times. Left voicemails with no callback. I will send a letter. Thanks.

## 2020-06-16 ENCOUNTER — ANTICOAGULATION VISIT (OUTPATIENT)
Dept: PHARMACY | Facility: HOSPITAL | Age: 60
End: 2020-06-16

## 2020-06-16 DIAGNOSIS — Z95.2 HX OF AORTIC VALVE REPLACEMENT, MECHANICAL: ICD-10-CM

## 2020-06-16 LAB
INR PPP: 2.8 (ref 0.91–1.09)
PROTHROMBIN TIME: 33.8 SECONDS (ref 10–13.8)

## 2020-06-16 PROCEDURE — 85610 PROTHROMBIN TIME: CPT

## 2020-06-16 PROCEDURE — 36416 COLLJ CAPILLARY BLOOD SPEC: CPT

## 2020-06-16 NOTE — PROGRESS NOTES
Anticoagulation Clinic Progress Note    Anticoagulation Summary  As of 2020    INR goal:   2.5-3.5   TTR:   44.8 % (1.7 y)   INR used for dosin.8 (2020)   Warfarin maintenance plan:   10 mg every Mon, Fri; 7.5 mg all other days   Weekly warfarin total:   57.5 mg   No change documented:   Gael Turner RPH   Plan last modified:   Martha Valle RPH (2018)   Next INR check:   2020   Priority:   Maintenance   Target end date:   Indefinite    Indications    Hx of aortic valve replacement  mechanical [Z95.2] [Z95.2]             Anticoagulation Episode Summary     INR check location:       Preferred lab:       Send INR reminders to:   [facility]    Comments:         Anticoagulation Care Providers     Provider Role Specialty Phone number    Kitty Lagunas MD Referring Cardiology [phone]          Clinic Interview:  Patient Findings     Negatives:   Signs/symptoms of thrombosis, Signs/symptoms of bleeding,   Laboratory test error suspected, Change in health, Change in alcohol use,   Change in activity, Upcoming invasive procedure, Emergency department   visit, Upcoming dental procedure, Missed doses, Extra doses, Change in   medications, Change in diet/appetite, Hospital admission, Bruising, Other   complaints      Clinical Outcomes     Negatives:   Major bleeding event, Thromboembolic event,   Anticoagulation-related hospital admission, Anticoagulation-related ED   visit, Anticoagulation-related fatality        INR History:  Anticoagulation Monitoring 3/12/2020 2020 2020   INR 3.1 2.0 2.8   INR Date 3/12/2020 2020 2020   INR Goal 2.5-3.5 2.5-3.5 2.5-3.5   Trend Same Same Same   Last Week Total 57.5 mg 57.5 mg 57.5 mg   Next Week Total 57.5 mg 60 mg 57.5 mg   Sun 7.5 mg 7.5 mg 7.5 mg   Mon 10 mg 10 mg 10 mg   Tue 7.5 mg 7.5 mg 7.5 mg   Wed 7.5 mg 7.5 mg 7.5 mg   Thu 7.5 mg 7.5 mg 7.5 mg   Fri 10 mg 10 mg 10 mg   Sat 7.5 mg 10 mg (); Otherwise 7.5 mg 7.5  mg   Visit Report - - -   Some recent data might be hidden       Plan:  1. INR is Therapeutic today- see above in Anticoagulation Summary.  Will instruct Stew Cabral to Continue their warfarin regimen- see above in Anticoagulation Summary.  2. Follow up in 4 weeks  3. Patient declines warfarin refills.  4. Verbal and written information provided. Patient expresses understanding and has no further questions at this time.    Gael Turner [facility]

## 2020-08-26 ENCOUNTER — ANTICOAGULATION VISIT (OUTPATIENT)
Dept: PHARMACY | Facility: HOSPITAL | Age: 60
End: 2020-08-26

## 2020-08-26 DIAGNOSIS — Z95.2 HX OF AORTIC VALVE REPLACEMENT, MECHANICAL: ICD-10-CM

## 2020-08-26 LAB
INR PPP: 2.3 (ref 0.91–1.09)
PROTHROMBIN TIME: 28 SECONDS (ref 10–13.8)

## 2020-08-26 PROCEDURE — 36416 COLLJ CAPILLARY BLOOD SPEC: CPT

## 2020-08-26 PROCEDURE — 85610 PROTHROMBIN TIME: CPT

## 2020-08-26 PROCEDURE — G0463 HOSPITAL OUTPT CLINIC VISIT: HCPCS

## 2020-08-26 RX ORDER — WARFARIN SODIUM 5 MG/1
TABLET ORAL
Qty: 150 TABLET | Refills: 1 | OUTPATIENT
Start: 2020-08-26 | End: 2020-12-22 | Stop reason: SDUPTHER

## 2020-08-26 NOTE — PROGRESS NOTES
Anticoagulation Clinic Progress Note    Anticoagulation Summary  As of 2020    INR goal:   2.5-3.5   TTR:   46.3 % (1.9 y)   INR used for dosin.3! (2020)   Warfarin maintenance plan:   10 mg every Mon, Fri; 7.5 mg all other days   Weekly warfarin total:   57.5 mg   Plan last modified:   Martha Valle RPH (2018)   Next INR check:   2020   Priority:   Maintenance   Target end date:   Indefinite    Indications    Hx of aortic valve replacement  mechanical [Z95.2] [Z95.2]             Anticoagulation Episode Summary     INR check location:       Preferred lab:       Send INR reminders to:    [facility]    Comments:         Anticoagulation Care Providers     Provider Role Specialty Phone number    Kitty Lagunas MD Referring Cardiology [phone]          Clinic Interview:  Patient Findings     Positives:   Change in diet/appetite, Other complaints    Negatives:   Signs/symptoms of thrombosis, Signs/symptoms of bleeding,   Laboratory test error suspected, Change in health, Change in alcohol use,   Change in activity, Upcoming invasive procedure, Emergency department   visit, Upcoming dental procedure, Missed doses, Extra doses, Change in   medications, Hospital admission, Bruising    Comments:   Reports change in routine/eating habits while off work the   past few weeks for vacation; will return to work next week.       Clinical Outcomes     Negatives:   Major bleeding event, Thromboembolic event,   Anticoagulation-related hospital admission, Anticoagulation-related ED   visit, Anticoagulation-related fatality    Comments:   Reports change in routine/eating habits while off work the   past few weeks for vacation; will return to work next week.         INR History:  Anticoagulation Monitoring 2020   INR 2.0 2.8 2.3   INR Date 2020   INR Goal 2.5-3.5 2.5-3.5 2.5-3.5   Trend Same Same Same   Last Week Total 57.5 mg 57.5 mg 57.5  mg   Next Week Total 60 mg 57.5 mg 60 mg   Sun 7.5 mg 7.5 mg 7.5 mg   Mon 10 mg 10 mg 10 mg   Tue 7.5 mg 7.5 mg 7.5 mg   Wed 7.5 mg 7.5 mg 10 mg (8/26); Otherwise 7.5 mg   Thu 7.5 mg 7.5 mg 7.5 mg   Fri 10 mg 10 mg 10 mg   Sat 10 mg (5/2); Otherwise 7.5 mg 7.5 mg 7.5 mg   Visit Report - - -   Some recent data might be hidden       Plan:  1. INR is Therapeutic today- see above in Anticoagulation Summary.  Will instruct Stew GABINO Cabral to Change their warfarin regimen- see above in Anticoagulation Summary.  2. Follow up in 3 weeks  3. Patient desires warfarin refills.  4. Verbal and written information provided. Patient expresses understanding and has no further questions at this time.    Gael Turner [facility]

## 2020-10-06 ENCOUNTER — TELEPHONE (OUTPATIENT)
Dept: PHARMACY | Facility: HOSPITAL | Age: 60
End: 2020-10-06

## 2020-10-20 ENCOUNTER — TELEPHONE (OUTPATIENT)
Dept: PHARMACY | Facility: HOSPITAL | Age: 60
End: 2020-10-20

## 2020-11-23 ENCOUNTER — ANTICOAGULATION VISIT (OUTPATIENT)
Dept: PHARMACY | Facility: HOSPITAL | Age: 60
End: 2020-11-23

## 2020-11-23 DIAGNOSIS — Z95.2 HX OF AORTIC VALVE REPLACEMENT, MECHANICAL: ICD-10-CM

## 2020-11-23 LAB
INR PPP: 1.7 (ref 0.91–1.09)
PROTHROMBIN TIME: 20.9 SECONDS (ref 10–13.8)

## 2020-11-23 PROCEDURE — 85610 PROTHROMBIN TIME: CPT

## 2020-11-23 PROCEDURE — G0463 HOSPITAL OUTPT CLINIC VISIT: HCPCS

## 2020-11-23 PROCEDURE — 36416 COLLJ CAPILLARY BLOOD SPEC: CPT

## 2020-11-23 NOTE — PROGRESS NOTES
I have supervised and reviewed the notes, assessments, and/or procedures performed by our Pharmacy Intern. The documented assessment and plan were developed cooperatively, and the plan was implemented in my presence. I concur with the documentation of this patient encounter.    We discussed importance of compliance with his INR rechecks in order to ensure he is on the correct dosing to keep his INR in range and reduce his risk of clots/strokes.  He agreed to go to [place] if that works better for him instead of coming to clinic--but made clinic appt for now.    Martha Valle, [facility]

## 2020-11-23 NOTE — PROGRESS NOTES
Anticoagulation Clinic Progress Note    Anticoagulation Summary  As of 2020    INR goal:  2.5-3.5   TTR:  41.1 % (2.2 y)   INR used for dosin.7 (2020)   Warfarin maintenance plan:  10 mg every Mon, Wed, Fri; 7.5 mg all other days   Weekly warfarin total:  60 mg   Plan last modified:  Martha Valle RPH (2020)   Next INR check:  2020   Priority:  Maintenance   Target end date:  Indefinite    Indications    Hx of aortic valve replacement  mechanical [Z95.2] [Z95.2]             Anticoagulation Episode Summary     INR check location:      Preferred lab:      Send INR reminders to:   [facility]    Comments:        Anticoagulation Care Providers     Provider Role Specialty Phone number    Kitty Lagunas MD Referring Cardiology [phone]          Clinic Interview:  Patient Findings     Positives:  Missed doses    Negatives:  Signs/symptoms of thrombosis, Signs/symptoms of bleeding,   Laboratory test error suspected, Change in health, Change in alcohol use,   Change in activity, Upcoming invasive procedure, Emergency department   visit, Upcoming dental procedure, Extra doses, Change in medications,   Change in diet/appetite, Hospital admission, Bruising, Other complaints    Comments:  May have missed a dose.      Clinical Outcomes     Negatives:  Major bleeding event, Thromboembolic event,   Anticoagulation-related hospital admission, Anticoagulation-related ED   visit, Anticoagulation-related fatality    Comments:  May have missed a dose.        INR History:  Anticoagulation Monitoring 2020   INR 2.8 2.3 1.7   INR Date 2020   INR Goal 2.5-3.5 2.5-3.5 2.5-3.5   Trend Same Same Up   Last Week Total 57.5 mg 57.5 mg 57.5 mg   Next Week Total 57.5 mg 60 mg 62.5 mg   Sun 7.5 mg 7.5 mg 7.5 mg   Mon 10 mg 10 mg 12.5 mg ()   Tue 7.5 mg 7.5 mg 7.5 mg   Wed 7.5 mg 10 mg (); Otherwise 7.5 mg 10 mg   Thu 7.5 mg 7.5 mg 7.5 mg     Fri 10 mg 10 mg 10 mg   Sat 7.5 mg 7.5 mg 7.5 mg   Visit Report - - -   Some recent data might be hidden       Plan:  1. INR is Subtherapeutic today- see above in Anticoagulation Summary.  Will instruct Stew Cabral to boost their warfarin to 12.5mg today then change their warfarin regimen to 10mg M/W/F and 7.5 mg all other days.- see above in Anticoagulation Summary.  2. Follow up in 1 week  3. Patient declines warfarin refills.  4. Verbal and written information provided. Patient expresses understanding and has no further questions at this time.    Anabelle Vaughn, Pharmacy Intern

## 2020-11-30 ENCOUNTER — ANTICOAGULATION VISIT (OUTPATIENT)
Dept: PHARMACY | Facility: HOSPITAL | Age: 60
End: 2020-11-30

## 2020-11-30 DIAGNOSIS — Z95.2 HX OF AORTIC VALVE REPLACEMENT, MECHANICAL: ICD-10-CM

## 2020-11-30 LAB
INR PPP: 3.9 (ref 0.91–1.09)
PROTHROMBIN TIME: 46.3 SECONDS (ref 10–13.8)

## 2020-11-30 PROCEDURE — G0463 HOSPITAL OUTPT CLINIC VISIT: HCPCS

## 2020-11-30 PROCEDURE — 85610 PROTHROMBIN TIME: CPT

## 2020-11-30 PROCEDURE — 36416 COLLJ CAPILLARY BLOOD SPEC: CPT

## 2020-11-30 NOTE — PROGRESS NOTES
I have supervised and reviewed the notes, assessments, and/or procedures performed by our Pharmacy Intern. The documented assessment and plan were developed cooperatively, and the plan was implemented in my presence. I concur with the documentation of this patient encounter.    Elsi Stephens [facility]

## 2020-11-30 NOTE — PROGRESS NOTES
Anticoagulation Clinic Progress Note    Anticoagulation Summary  As of 11/30/2020    INR goal:  2.5-3.5   TTR:  41.2 % (2.2 y)   INR used for dosing:  3.9 (11/30/2020)   Warfarin maintenance plan:  10 mg every Mon, Wed, Fri; 7.5 mg all other days   Weekly warfarin total:  60 mg   Plan last modified:  Martha Valle RPH (11/23/2020)   Next INR check:  12/7/2020   Priority:  Maintenance   Target end date:  Indefinite    Indications    Hx of aortic valve replacement  mechanical [Z95.2] [Z95.2]             Anticoagulation Episode Summary     INR check location:      Preferred lab:      Send INR reminders to:   [facility]    Comments:        Anticoagulation Care Providers     Provider Role Specialty Phone number    Kitty Lagunas MD Referring Cardiology [phone]          Clinic Interview:  Patient Findings     Positives:  Change in alcohol use, Missed doses    Comments:  Had several beers yesterday 11/29 while watching football. Only took 5 mg yesterday 11/29 and reported already taking 10 mg dose this morning, 11/30.       Clinical Outcomes     Comments:  Had several beers yesterday 11/29 while watching football.  Only took 5 mg yesterday 11/29.        INR History:  Anticoagulation Monitoring 8/26/2020 11/23/2020 11/30/2020   INR 2.3 1.7 3.9   INR Date 8/26/2020 11/23/2020 11/30/2020   INR Goal 2.5-3.5 2.5-3.5 2.5-3.5   Trend Same Up Same   Last Week Total 57.5 mg 57.5 mg 60 mg   Next Week Total 60 mg 62.5 mg 57.5 mg   Sun 7.5 mg 7.5 mg 7.5 mg   Mon 10 mg 12.5 mg (11/23) 10 mg   Tue 7.5 mg 7.5 mg 5 mg (12/1)   Wed 10 mg (8/26); Otherwise 7.5 mg 10 mg 10 mg   Thu 7.5 mg 7.5 mg 7.5 mg   Fri 10 mg 10 mg 10 mg   Sat 7.5 mg 7.5 mg 7.5 mg   Visit Report - - -   Some recent data might be hidden       Plan:  1. INR is Supratherapeutic today- see above in Anticoagulation Summary.  Will instruct Stew Cabral to decrease tomorrow dose to 5 mg (since already took 10 mg dose this morning) then continue  their warfarin regimen- see above in Anticoagulation Summary.  2. Follow up in 1 week  3. Patient declines warfarin refills.  4. Verbal and written information provided. Patient expresses understanding and has no further questions at this time.    Anabelle Vaughn, Pharmacy Intern

## 2020-12-07 ENCOUNTER — ANTICOAGULATION VISIT (OUTPATIENT)
Dept: PHARMACY | Facility: HOSPITAL | Age: 60
End: 2020-12-07

## 2020-12-07 DIAGNOSIS — Z95.2 HX OF AORTIC VALVE REPLACEMENT, MECHANICAL: ICD-10-CM

## 2020-12-07 LAB
INR PPP: 3.4 (ref 0.91–1.09)
PROTHROMBIN TIME: 41.3 SECONDS (ref 10–13.8)

## 2020-12-07 PROCEDURE — 36416 COLLJ CAPILLARY BLOOD SPEC: CPT

## 2020-12-07 PROCEDURE — 85610 PROTHROMBIN TIME: CPT

## 2020-12-07 NOTE — PROGRESS NOTES
Anticoagulation Clinic Progress Note    Anticoagulation Summary  As of 12/7/2020    INR goal:  2.5-3.5   TTR:  41.0 % (2.2 y)   INR used for dosing:  3.4 (12/7/2020)   Warfarin maintenance plan:  10 mg every Mon, Wed, Fri; 7.5 mg all other days   Weekly warfarin total:  60 mg   No change documented:  Aleta Sood   Plan last modified:  Martha Valle RPH (11/23/2020)   Next INR check:  12/21/2020   Priority:  Maintenance   Target end date:  Indefinite    Indications    Hx of aortic valve replacement  mechanical [Z95.2] [Z95.2]             Anticoagulation Episode Summary     INR check location:      Preferred lab:      Send INR reminders to:  [facility]    Comments:        Anticoagulation Care Providers     Provider Role Specialty Phone number    Kitty Lagunas MD Referring Cardiology [phone]          Clinic Interview:  Patient Findings     Negatives:  Signs/symptoms of thrombosis, Signs/symptoms of bleeding,   Laboratory test error suspected, Change in health, Change in alcohol use,   Change in activity, Upcoming invasive procedure, Emergency department   visit, Upcoming dental procedure, Missed doses, Extra doses, Change in   medications, Change in diet/appetite, Hospital admission, Bruising, Other   complaints      Clinical Outcomes     Negatives:  Major bleeding event, Thromboembolic event,   Anticoagulation-related hospital admission, Anticoagulation-related ED   visit, Anticoagulation-related fatality        INR History:  Anticoagulation Monitoring 11/23/2020 11/30/2020 12/7/2020   INR 1.7 3.9 3.4   INR Date 11/23/2020 11/30/2020 12/7/2020   INR Goal 2.5-3.5 2.5-3.5 2.5-3.5   Trend Up Same Same   Last Week Total 57.5 mg 60 mg 57.5 mg   Next Week Total 62.5 mg 57.5 mg 60 mg   Sun 7.5 mg 7.5 mg 7.5 mg   Mon 12.5 mg (11/23) 10 mg 10 mg   Tue 7.5 mg 5 mg (12/1) 7.5 mg   Wed 10 mg 10 mg 10 mg   Thu 7.5 mg 7.5 mg 7.5 mg   Fri 10 mg 10 mg 10 mg   Sat 7.5 mg 7.5 mg 7.5 mg   Visit Report - -  -   Some recent data might be hidden       Plan:  1. INR is therapeutic today- see above in Anticoagulation Summary.   Will instruct Stew Cabral to continue their warfarin regimen- see above in Anticoagulation Summary.  2. Follow up in 2 weeks.  3. Patient declines warfarin refills.  4. Verbal and written information provided. Patient expresses understanding and has no further questions at this time.    Aleta Sood

## 2020-12-22 NOTE — TELEPHONE ENCOUNTER
Patient requesting refill, last seen in the office on 1/15/19, but does look like he has been keeping up with INR checks.    [facility] - please advise on refill.    Emilie - please contact patient to schedule f/u with Dr. Lagunas or EUFEMIA.    Thank you!

## 2020-12-23 RX ORDER — WARFARIN SODIUM 5 MG/1
TABLET ORAL
Qty: 155 TABLET | Refills: 1 | Status: SHIPPED | OUTPATIENT
Start: 2020-12-23 | End: 2021-07-05

## 2021-01-06 ENCOUNTER — TELEPHONE (OUTPATIENT)
Dept: PHARMACY | Facility: HOSPITAL | Age: 61
End: 2021-01-06

## 2021-01-07 ENCOUNTER — OFFICE VISIT (OUTPATIENT)
Dept: CARDIOLOGY | Facility: CLINIC | Age: 61
End: 2021-01-07

## 2021-01-07 VITALS
WEIGHT: 189.6 LBS | OXYGEN SATURATION: 97 % | SYSTOLIC BLOOD PRESSURE: 140 MMHG | DIASTOLIC BLOOD PRESSURE: 80 MMHG | HEIGHT: 69 IN | BODY MASS INDEX: 28.08 KG/M2 | HEART RATE: 83 BPM

## 2021-01-07 DIAGNOSIS — Z95.2 HX OF AORTIC VALVE REPLACEMENT, MECHANICAL: Primary | ICD-10-CM

## 2021-01-07 DIAGNOSIS — Z71.6 TOBACCO ABUSE COUNSELING: ICD-10-CM

## 2021-01-07 DIAGNOSIS — Z72.0 TOBACCO ABUSE: ICD-10-CM

## 2021-01-07 DIAGNOSIS — Z79.01 WARFARIN ANTICOAGULATION: ICD-10-CM

## 2021-01-07 PROBLEM — M51.26 DISPLACEMENT OF LUMBAR INTERVERTEBRAL DISC WITHOUT MYELOPATHY: Status: ACTIVE | Noted: 2021-01-07

## 2021-01-07 PROBLEM — M54.50 ACUTE LOW BACK PAIN: Status: ACTIVE | Noted: 2021-01-07

## 2021-01-07 PROBLEM — M79.609 PAIN IN EXTREMITY: Status: ACTIVE | Noted: 2021-01-07

## 2021-01-07 PROBLEM — IMO0002 DISPLACEMENT OF INTERVERTEBRAL DISC: Status: ACTIVE | Noted: 2021-01-07

## 2021-01-07 PROCEDURE — 99214 OFFICE O/P EST MOD 30 MIN: CPT | Performed by: NURSE PRACTITIONER

## 2021-01-07 PROCEDURE — 93000 ELECTROCARDIOGRAM COMPLETE: CPT | Performed by: NURSE PRACTITIONER

## 2021-01-07 RX ORDER — LISDEXAMFETAMINE DIMESYLATE 50 MG
CAPSULE ORAL
COMMUNITY
Start: 2020-11-30

## 2021-01-07 NOTE — PROGRESS NOTES
Date of Office Visit: 21  Encounter Provider: EUFEMIA Young  Primary Cardiologist: Dr. Lagunas    Place of Service: [facility]  Patient Name: Stew Cabral  :1960      Subjective:     Chief Complaint:  Overdue follow-up, mechanical aortic valve    History of Present Illness:  Stew Cabral is a pleasant 60 y.o. male who is new to me .  Outside records have been requested and reviewed by me if available.     This is a patient of Dr. Lagunas with history of a bicuspid aortic valve with critical aortic stenosis status post mechanical aortic valve replacement 25 mm ATS valve 2009, prior nonischemic cardiomyopathy that resolved.    Patient was previously followed by Dr. Arenas for aortic stenosis and at the time of his aortic valve surgery he was noted to have a nonischemic cardiomyopathy with an EF of 30% and repeat echo following surgery showed normalization of his EF.    2013 Dr. Lagunas began following him to establish care.  He had a repeat echo that showed normal LV function and normal functioning aortic valve.  Another repeat echo 2016 that continue to showed normal LV function normal functioning aortic valve.  At 1 point time he was checking his own INRs with a home monitor but became noncompliant for a period of time instructed by the home monitoring service in at that point was set up with anticoagulation clinic.    1/15/2019 patient was last in the office to see Dr. Lagunas.  He had an echo that day that showed normal LV systolic function with an EF of 59%, normal diastolic function normal function mechanical aortic valve.  Overall he reported he had been feeling well.  He was having some ankle edema primarily at the end of the day but was walking and standing on concrete most of the day and was drinking more diet Dr. Pepper the last few months.  He was recommended he cut back on his soda consumption was felt that his edema would  improve.    Last INR therapeutic 3.4 12/7/2020    He is presenting today for overdue follow-up.  He is overall doing well.  He has gained quite a bit of weight over the last year which he attributes to poor diet working night shift and working a new job where he is not quite as active as he was before.  He denies any chest pain, shortness of breath, palpitations, dizziness, lightheadedness, syncope, near syncope, lower extremity edema.  He admits he does need to exercise more frequently change his diet.  He is smoking a little over a pack per day and is aware he needs to quit.  He has quit before for a year and this is one of his New Year's resolutions.  He is overdue for follow-up with his PCP but intends to make an appointment.  He denies any bleeding issues or falls with his warfarin.  He he is overdue for his INR check but intends to schedule follow-up really soon.      Past Medical History:   Diagnosis Date   • ADD (attention deficit disorder)    • Aortic valvar stenosis    • Bicuspid aortic valve    • CHF (congestive heart failure) (CMS/[facility])    • Displacement of lumbar intervertebral disc      Past Surgical History:   Procedure Laterality Date   • AORTIC VALVE REPAIR/REPLACEMENT  2009   • CORONARY ANGIOPLASTY Left     AV stenosis   • CORONARY ARTERY BYPASS GRAFT       Outpatient Medications Prior to Visit   Medication Sig Dispense Refill   • warfarin (COUMADIN) 5 MG tablet Take two tablets (10 mg) by mouth on Mon, Wed, and Fri, and take one and one-half tablets (7.5 mg) by mouth all other days or as directed. 155 tablet 1   • Vyvanse 50 MG capsule TK 1 C QAM     • cyclobenzaprine (FLEXERIL) 10 MG tablet Take 1 tablet by mouth 3 (Three) Times a Day As Needed for Muscle Spasms. 15 tablet 0   • HYDROcodone-acetaminophen (NORCO) 5-325 MG per tablet Take 1 tablet by mouth Every 6 (Six) Hours As Needed for Moderate Pain . 8 tablet 0     No facility-administered medications prior to visit.        Allergies as of  01/07/2021   • (No Known Allergies)     Social History     Socioeconomic History   • Marital status: Single     Spouse name: Not on file   • Number of children: Not on file   • Years of education: Not on file   • Highest education level: Not on file   Tobacco Use   • Smoking status: Current Every Day Smoker     Packs/day: 1.00     Years: 30.00     Pack years: 30.00   • Smokeless tobacco: Never Used   • Tobacco comment: Caffiene daily   Substance and Sexual Activity   • Alcohol use: Yes     Alcohol/week: 0.0 - 1.0 standard drinks     Frequency: Never     Comment: Rarely   • Drug use: No   • Sexual activity: Defer     History reviewed. No pertinent family history.  Review of Systems   Constitution: Positive for weight gain (20 lbs). Negative for chills, fever and malaise/fatigue.   HENT: Negative for ear pain, hearing loss, nosebleeds and sore throat.    Eyes: Negative for double vision, pain, vision loss in left eye and vision loss in right eye.   Cardiovascular: Negative for chest pain, claudication, dyspnea on exertion, irregular heartbeat, leg swelling, near-syncope, orthopnea, palpitations, paroxysmal nocturnal dyspnea and syncope.   Respiratory: Negative for cough, shortness of breath, snoring and wheezing.    Endocrine: Negative for cold intolerance and heat intolerance.   Hematologic/Lymphatic: Negative for bleeding problem.   Skin: Negative for color change, itching, rash and unusual hair distribution.   Musculoskeletal: Negative for joint pain and joint swelling.   Gastrointestinal: Negative for abdominal pain, diarrhea, hematochezia, melena, nausea and vomiting.   Genitourinary: Negative for decreased libido, frequency, hematuria, hesitancy and incomplete emptying.   Neurological: Negative for excessive daytime sleepiness, dizziness, headaches, light-headedness, loss of balance, numbness, paresthesias and seizures.   Psychiatric/Behavioral: Negative for depression.          Objective:     Vitals:     "01/07/21 1359 01/07/21 1420   BP: 140/80    BP Location: Right arm    Patient Position: Sitting    Pulse: 73 83   SpO2:  97%   Weight: 86 kg (189 lb 9.6 oz)    Height: 175.3 cm (69\")      Body mass index is 28 kg/m².    PHYSICAL EXAM:  Vitals signs and nursing note reviewed.   Constitutional:       General: Not in acute distress.     Appearance: Well-developed and not in distress. Not diaphoretic.   HENT:      Head: Normocephalic and atraumatic.   Neck:      Vascular: No carotid bruit or JVD.   Pulmonary:      Effort: Pulmonary effort is normal. No respiratory distress.      Breath sounds: Normal breath sounds.   Cardiovascular:      Normal rate. Regular rhythm.      Comments: Mechanical aortic valve click  Pulses:     Radial: 2+ bilaterally.  Edema:     Peripheral edema absent.   Abdominal:      General: Bowel sounds are normal. There is no distension.      Palpations: Abdomen is soft.      Tenderness: There is no abdominal tenderness.   Skin:     General: Skin is warm and dry.      Findings: No erythema.   Neurological:      Mental Status: Alert and oriented to person, place, and time.   Psychiatric:         Attention and Perception: Attention normal.         Mood and Affect: Mood normal.         Speech: Speech normal.         Behavior: Behavior normal.         Thought Content: Thought content normal.         Cognition and Memory: Cognition normal.         Judgment: Judgment normal.           ECG 12 Lead    Date/Time: 1/7/2021 1:59 PM  Performed by: Glory Mccauley APRN  Authorized by: Glory Mccauley APRN   Comparison: compared with previous ECG from 1/15/2019  Similar to previous ECG  Comparison to previous ECG: PAC new, HR faster  Rhythm: sinus rhythm  Ectopy: atrial premature contractions  Rate: normal  BPM: 73  QRS axis: borderline left.  Other findings: non-specific ST-T wave changes and T wave abnormality    Clinical impression: non-specific ECG  Comments: Indication: mechanical AVR            Most recent " lab review:  None.  Overdue for follow-up with PCP.  He was advised to make an appointment.  Assessment:       Diagnosis Plan   1. Hx of aortic valve replacement, mechanical [Z95.2]  ECG 12 Lead    Adult Transthoracic Echo Complete w/ Color, Spectral and Contrast if Necessary Per Protocol   2. Warfarin anticoagulation  ECG 12 Lead    Adult Transthoracic Echo Complete w/ Color, Spectral and Contrast if Necessary Per Protocol   3. Tobacco abuse  ECG 12 Lead    Adult Transthoracic Echo Complete w/ Color, Spectral and Contrast if Necessary Per Protocol   4. Tobacco abuse counseling  ECG 12 Lead    Adult Transthoracic Echo Complete w/ Color, Spectral and Contrast if Necessary Per Protocol       Plan:     1. History of mechanical aortic valve replacement.  Normal function on last echo 1/2019 .  Continue routine monitoring of his INRs through anticoagulation clinic.  Bleeding and safety precautions reviewed.  2.    Borderline elevated blood pressure: He will monitor at home and I reviewed with him blood pressure goals.  I think if he starts to make some lifestyle changes increase his physical activity to try to lose some weight focus on a low-sodium heart healthy diet hopefully will not have to start any medication.  He will call me with an update on his blood pressures in a couple weeks.    3.  Tobacco abuse: He is smoking a little over 1 pack per day.  He has been counseled for over 3 minutes.  He is aware of the risk of continued smoking.  He has quit before for about a year with use of Nicorette gum.  His New Year's resolution is to quit smoking he will work on this and let us know if he needs any assistance.      I advised on the importance of medication compliance, good blood pressure, blood sugar and cholesterol control, as well as heart healthy diet, regular exercise and avoidance of tobacco for cardiovascular disease risk factor modification.     Overdue for follow-up for INR-reminded him to make sure he follows  up with anticoagulation clinic.  He is also past due to see his PCP so I recommended he make a routine follow-up for routine care/labs and he verbalized understanding.  No changes today other than following up on blood pressure readings and will plan for repeat echo next year with follow-up with Dr. Lagunas for routine 3-year surveillance of the aortic valve, unless otherwise needed sooner.  I advised the patient to contact our office with any questions or concerns.      It has been a pleasure to participate in this patient's care. Please feel free to contact me with any questions or concerns.     Glory Mccauley, APRN  01/07/21             Your medication list          Accurate as of January 7, 2021  2:28 PM. If you have any questions, ask your nurse or doctor.            CONTINUE taking these medications      Instructions Last Dose Given Next Dose Due   Vyvanse 50 MG capsule  Generic drug: lisdexamfetamine      TK 1 C QAM       warfarin 5 MG tablet  Commonly known as: COUMADIN      Take two tablets (10 mg) by mouth on Mon, Wed, and Fri, and take one and one-half tablets (7.5 mg) by mouth all other days or as directed.              The above medication changes may not have been made by this provider.  Medication list was updated to reflect medications patient is currently taking including medication changes and discontinuations made by other healthcare providers or the patients themselves.     Dictated utilizing Dragon Dictation System.

## 2021-01-15 ENCOUNTER — TELEPHONE (OUTPATIENT)
Dept: PHARMACY | Facility: HOSPITAL | Age: 61
End: 2021-01-15

## 2021-02-15 ENCOUNTER — ANTICOAGULATION VISIT (OUTPATIENT)
Dept: PHARMACY | Facility: HOSPITAL | Age: 61
End: 2021-02-15

## 2021-02-15 DIAGNOSIS — Z95.2 HX OF AORTIC VALVE REPLACEMENT, MECHANICAL: ICD-10-CM

## 2021-02-15 LAB
INR PPP: 3.7 (ref 0.91–1.09)
PROTHROMBIN TIME: 44.2 SECONDS (ref 10–13.8)

## 2021-02-15 PROCEDURE — G0463 HOSPITAL OUTPT CLINIC VISIT: HCPCS

## 2021-02-15 PROCEDURE — 36416 COLLJ CAPILLARY BLOOD SPEC: CPT

## 2021-02-15 PROCEDURE — 85610 PROTHROMBIN TIME: CPT

## 2021-02-15 NOTE — PROGRESS NOTES
Anticoagulation Clinic Progress Note    Anticoagulation Summary  As of 2/15/2021    INR goal:  2.5-3.5   TTR:  40.4 % (2.4 y)   INR used for dosing:  3.7 (2/15/2021)   Warfarin maintenance plan:  10 mg every Mon, Wed, Fri; 7.5 mg all other days   Weekly warfarin total:  60 mg   Plan last modified:  Martha Valle RPH (11/23/2020)   Next INR check:  3/8/2021   Priority:  Maintenance   Target end date:  Indefinite    Indications    Hx of aortic valve replacement  mechanical [Z95.2] [Z95.2]             Anticoagulation Episode Summary     INR check location:      Preferred lab:      Send INR reminders to:   [facility]    Comments:        Anticoagulation Care Providers     Provider Role Specialty Phone number    Kitty Lagunas MD Referring Cardiology [phone]          Clinic Interview:  Patient Findings     Negatives:  Signs/symptoms of thrombosis, Signs/symptoms of bleeding,   Laboratory test error suspected, Change in health, Change in alcohol use,   Change in activity, Upcoming invasive procedure, Emergency department   visit, Upcoming dental procedure, Missed doses, Extra doses, Change in   medications, Change in diet/appetite, Hospital admission, Bruising, Other   complaints      Clinical Outcomes     Negatives:  Major bleeding event, Thromboembolic event,   Anticoagulation-related hospital admission, Anticoagulation-related ED   visit, Anticoagulation-related fatality        INR History:  Anticoagulation Monitoring 11/30/2020 12/7/2020 2/15/2021   INR 3.9 3.4 3.7   INR Date 11/30/2020 12/7/2020 2/15/2021   INR Goal 2.5-3.5 2.5-3.5 2.5-3.5   Trend Same Same Same   Last Week Total 60 mg 57.5 mg 60 mg   Next Week Total 57.5 mg 60 mg 55 mg   Sun 7.5 mg 7.5 mg 7.5 mg   Mon 10 mg 10 mg 5 mg (2/15); Otherwise 10 mg   Tue 5 mg (12/1) 7.5 mg 7.5 mg   Wed 10 mg 10 mg 10 mg   Thu 7.5 mg 7.5 mg 7.5 mg   Fri 10 mg 10 mg 10 mg   Sat 7.5 mg 7.5 mg 7.5 mg   Visit Report - - -   Some recent data might be  hidden       Plan:  1. INR is Supratherapeutic today- see above in Anticoagulation Summary.  Will instruct Stew Cabral to reduce dose today only, then continue their warfarin regimen- see above in Anticoagulation Summary.  2. Follow up in 3 weeks  3. Patient declines warfarin refills.  4. Verbal and written information provided. Patient expresses understanding and has no further questions at this time.    Martha Valle [facility]

## 2021-03-16 ENCOUNTER — TELEPHONE (OUTPATIENT)
Dept: PHARMACY | Facility: HOSPITAL | Age: 61
End: 2021-03-16

## 2021-03-23 ENCOUNTER — TELEPHONE (OUTPATIENT)
Dept: PHARMACY | Facility: HOSPITAL | Age: 61
End: 2021-03-23

## 2021-03-24 ENCOUNTER — BULK ORDERING (OUTPATIENT)
Dept: CASE MANAGEMENT | Facility: OTHER | Age: 61
End: 2021-03-24

## 2021-03-24 DIAGNOSIS — Z23 IMMUNIZATION DUE: ICD-10-CM

## 2021-04-01 ENCOUNTER — TELEPHONE (OUTPATIENT)
Dept: PHARMACY | Facility: HOSPITAL | Age: 61
End: 2021-04-01

## 2021-04-02 ENCOUNTER — IMMUNIZATION (OUTPATIENT)
Dept: VACCINE CLINIC | Facility: HOSPITAL | Age: 61
End: 2021-04-02

## 2021-04-02 DIAGNOSIS — Z23 IMMUNIZATION DUE: ICD-10-CM

## 2021-04-02 PROCEDURE — 0001A: CPT | Performed by: INTERNAL MEDICINE

## 2021-04-02 PROCEDURE — 91300 HC SARSCOV02 VAC 30MCG/0.3ML IM: CPT | Performed by: INTERNAL MEDICINE

## 2021-04-05 ENCOUNTER — ANTICOAGULATION VISIT (OUTPATIENT)
Dept: PHARMACY | Facility: HOSPITAL | Age: 61
End: 2021-04-05

## 2021-04-05 DIAGNOSIS — Z95.2 HX OF AORTIC VALVE REPLACEMENT, MECHANICAL: ICD-10-CM

## 2021-04-05 LAB
INR PPP: 2.7 (ref 0.91–1.09)
PROTHROMBIN TIME: 32.7 SECONDS (ref 10–13.8)

## 2021-04-05 PROCEDURE — 36416 COLLJ CAPILLARY BLOOD SPEC: CPT

## 2021-04-05 PROCEDURE — 85610 PROTHROMBIN TIME: CPT

## 2021-04-05 NOTE — PROGRESS NOTES
I have supervised and reviewed the notes, assessments, and/or procedures performed by our Pharmacy Intern. I concur with the documentation of this patient encounter.    Gael Turner [facility]

## 2021-04-05 NOTE — PROGRESS NOTES
Anticoagulation Clinic Progress Note    Anticoagulation Summary  As of 2021    INR goal:  2.5-3.5   TTR:  42.5 % (2.5 y)   INR used for dosin.7 (2021)   Warfarin maintenance plan:  10 mg every Mon, Wed, Fri; 7.5 mg all other days   Weekly warfarin total:  60 mg   No change documented:  Emma Miranda, Pharmacy Intern   Plan last modified:  Martha Valle RPH (2020)   Next INR check:  2021   Priority:  Maintenance   Target end date:  Indefinite    Indications    Hx of aortic valve replacement  mechanical [Z95.2] [Z95.2]             Anticoagulation Episode Summary     INR check location:      Preferred lab:      Send INR reminders to:   [facility]    Comments:        Anticoagulation Care Providers     Provider Role Specialty Phone number    Kitty Lagunas MD Referring Cardiology [phone]          Clinic Interview:  Patient Findings     Negatives:  Signs/symptoms of thrombosis, Signs/symptoms of bleeding,   Laboratory test error suspected, Change in health, Change in alcohol use,   Change in activity, Upcoming invasive procedure, Emergency department   visit, Upcoming dental procedure, Missed doses, Extra doses, Change in   medications, Change in diet/appetite, Hospital admission, Bruising, Other   complaints      Clinical Outcomes     Negatives:  Major bleeding event, Thromboembolic event,   Anticoagulation-related hospital admission, Anticoagulation-related ED   visit, Anticoagulation-related fatality        INR History:  Anticoagulation Monitoring 2020 2/15/2021 2021   INR 3.4 3.7 2.7   INR Date 2020 2/15/2021 2021   INR Goal 2.5-3.5 2.5-3.5 2.5-3.5   Trend Same Same Same   Last Week Total 57.5 mg 60 mg 60 mg   Next Week Total 60 mg 55 mg 60 mg   Sun 7.5 mg 7.5 mg 7.5 mg   Mon 10 mg 5 mg (2/15); Otherwise 10 mg 10 mg   Tue 7.5 mg 7.5 mg 7.5 mg   Wed 10 mg 10 mg 10 mg   Thu 7.5 mg 7.5 mg 7.5 mg   Fri 10 mg 10 mg 10 mg   Sat 7.5 mg 7.5 mg 7.5 mg   Visit  Report - - -   Some recent data might be hidden       Plan:  1. INR is Therapeutic today- see above in Anticoagulation Summary.  Will instruct Stew Cabral to Continue their warfarin regimen- see above in Anticoagulation Summary.  2. Follow up in 3 weeks  3. Patient declines warfarin refills.  4. Verbal and written information provided. Patient expresses understanding and has no further questions at this time.    Emma Miranda, Pharmacy Intern

## 2021-04-23 ENCOUNTER — IMMUNIZATION (OUTPATIENT)
Dept: VACCINE CLINIC | Facility: HOSPITAL | Age: 61
End: 2021-04-23

## 2021-04-23 PROCEDURE — 91300 HC SARSCOV02 VAC 30MCG/0.3ML IM: CPT | Performed by: INTERNAL MEDICINE

## 2021-04-23 PROCEDURE — 0002A: CPT | Performed by: INTERNAL MEDICINE

## 2021-04-26 ENCOUNTER — ANTICOAGULATION VISIT (OUTPATIENT)
Dept: PHARMACY | Facility: HOSPITAL | Age: 61
End: 2021-04-26

## 2021-04-26 DIAGNOSIS — Z95.2 HX OF AORTIC VALVE REPLACEMENT, MECHANICAL: Primary | ICD-10-CM

## 2021-04-26 LAB
INR PPP: 3.6 (ref 0.91–1.09)
PROTHROMBIN TIME: 43.2 SECONDS (ref 10–13.8)

## 2021-04-26 PROCEDURE — 85610 PROTHROMBIN TIME: CPT

## 2021-04-26 PROCEDURE — 36416 COLLJ CAPILLARY BLOOD SPEC: CPT

## 2021-04-26 NOTE — PROGRESS NOTES
Anticoagulation Clinic Progress Note    Anticoagulation Summary  As of 4/26/2021    INR goal:  2.5-3.5   TTR:  43.5 % (2.6 y)   INR used for dosing:  3.6 (4/26/2021)   Warfarin maintenance plan:  10 mg every Mon, Wed, Fri; 7.5 mg all other days   Weekly warfarin total:  60 mg   Plan last modified:  Martha Valle RPH (11/23/2020)   Next INR check:  5/17/2021   Priority:  Maintenance   Target end date:  Indefinite    Indications    Hx of aortic valve replacement  mechanical [Z95.2] [Z95.2]             Anticoagulation Episode Summary     INR check location:      Preferred lab:      Send INR reminders to:   [facility]    Comments:        Anticoagulation Care Providers     Provider Role Specialty Phone number    Kitty Lagunas MD Referring Cardiology [phone]          Clinic Interview:  Patient Findings     Positives:  Extra doses    Negatives:  Signs/symptoms of thrombosis, Signs/symptoms of bleeding,   Laboratory test error suspected, Change in health, Change in alcohol use,   Change in activity, Upcoming invasive procedure, Emergency department   visit, Upcoming dental procedure, Missed doses, Change in medications,   Change in diet/appetite, Hospital admission, Bruising, Other complaints    Comments:  Sometimes takes 2 tablets rather than one and one-half or the   half tablets are not split appropriately       Clinical Outcomes     Negatives:  Major bleeding event, Thromboembolic event,   Anticoagulation-related hospital admission, Anticoagulation-related ED   visit, Anticoagulation-related fatality    Comments:  Sometimes takes 2 tablets rather than one and one-half or the   half tablets are not split appropriately         INR History:  Anticoagulation Monitoring 2/15/2021 4/5/2021 4/26/2021   INR 3.7 2.7 3.6   INR Date 2/15/2021 4/5/2021 4/26/2021   INR Goal 2.5-3.5 2.5-3.5 2.5-3.5   Trend Same Same Same   Last Week Total 60 mg 60 mg 60 mg   Next Week Total 55 mg 60 mg 60 mg   Sun 7.5 mg 7.5  mg 7.5 mg   Mon 5 mg (2/15); Otherwise 10 mg 10 mg 10 mg   Tue 7.5 mg 7.5 mg 7.5 mg   Wed 10 mg 10 mg 10 mg   Thu 7.5 mg 7.5 mg 7.5 mg   Fri 10 mg 10 mg 10 mg   Sat 7.5 mg 7.5 mg 7.5 mg   Visit Report - - -   Some recent data might be hidden       Plan:  1. INR is Supratherapeutic today- see above in Anticoagulation Summary.  Will instruct Stew GABINO Cabral to Continue their warfarin regimen- see above in Anticoagulation Summary. Counseled the patient on appropriately taking warfarin.   2. Follow up in 3 weeks  3. Patient declines warfarin refills.  4. Verbal and written information provided. Patient expresses understanding and has no further questions at this time.    Dylan Freedman [facility]

## 2021-05-17 ENCOUNTER — ANTICOAGULATION VISIT (OUTPATIENT)
Dept: PHARMACY | Facility: HOSPITAL | Age: 61
End: 2021-05-17

## 2021-05-17 DIAGNOSIS — Z95.2 HX OF AORTIC VALVE REPLACEMENT, MECHANICAL: Primary | ICD-10-CM

## 2021-05-17 LAB
INR PPP: 3.7 (ref 0.91–1.09)
PROTHROMBIN TIME: 44.8 SECONDS (ref 10–13.8)

## 2021-05-17 PROCEDURE — 36416 COLLJ CAPILLARY BLOOD SPEC: CPT

## 2021-05-17 PROCEDURE — G0463 HOSPITAL OUTPT CLINIC VISIT: HCPCS

## 2021-05-17 PROCEDURE — 85610 PROTHROMBIN TIME: CPT

## 2021-05-17 NOTE — PROGRESS NOTES
Anticoagulation Clinic Progress Note    Anticoagulation Summary  As of 5/17/2021    INR goal:  2.5-3.5   TTR:  42.6 % (2.6 y)   INR used for dosing:  3.7 (5/17/2021)   Warfarin maintenance plan:  10 mg every Mon, Wed, Fri; 7.5 mg all other days   Weekly warfarin total:  60 mg   No change documented:  Gael Turner RPH   Plan last modified:  Martha Valle RPH (11/23/2020)   Next INR check:  6/7/2021   Priority:  Maintenance   Target end date:  Indefinite    Indications    Hx of aortic valve replacement  mechanical [Z95.2] [Z95.2]             Anticoagulation Episode Summary     INR check location:      Preferred lab:      Send INR reminders to:  MARIANNE BILLS [facility]    Comments:        Anticoagulation Care Providers     Provider Role Specialty Phone number    Kitty Lagunas MD Referring Cardiology [phone]          Clinic Interview:  Patient Findings     Positives:  Other complaints    Negatives:  Signs/symptoms of thrombosis, Signs/symptoms of bleeding,   Laboratory test error suspected, Change in health, Change in alcohol use,   Change in activity, Upcoming invasive procedure, Emergency department   visit, Upcoming dental procedure, Missed doses, Extra doses, Change in   medications, Change in diet/appetite, Hospital admission, Bruising    Comments:  Already took today's 10-mg dose. Pt reports intention to begin   using new tablet cutter to help break tablets.       Clinical Outcomes     Negatives:  Major bleeding event, Thromboembolic event,   Anticoagulation-related hospital admission, Anticoagulation-related ED   visit, Anticoagulation-related fatality    Comments:  Already took today's 10-mg dose. Pt reports intention to begin   using new tablet cutter to help break tablets.         INR History:  Anticoagulation Monitoring 4/5/2021 4/26/2021 5/17/2021   INR 2.7 3.6 3.7   INR Date 4/5/2021 4/26/2021 5/17/2021   INR Goal 2.5-3.5 2.5-3.5 2.5-3.5   Trend Same Same Same   Last Week Total 60 mg 60 mg 60  mg   Next Week Total 60 mg 60 mg 60 mg   Sun 7.5 mg 7.5 mg 7.5 mg   Mon 10 mg 10 mg 10 mg   Tue 7.5 mg 7.5 mg 7.5 mg   Wed 10 mg 10 mg 10 mg   Thu 7.5 mg 7.5 mg 7.5 mg   Fri 10 mg 10 mg 10 mg   Sat 7.5 mg 7.5 mg 7.5 mg   Visit Report - - -   Some recent data might be hidden       Plan:  1. INR is Supratherapeutic today- see above in Anticoagulation Summary.  Will instruct Stew GABINO Cabral to Continue their warfarin regimen- see above in Anticoagulation Summary.  2. Follow up in 3 weeks (2 weeks is Memorial day, and pt requests Monday appt).   3. Patient declines warfarin refills.  4. Verbal and written information provided. Patient expresses understanding and has no further questions at this time.    Gael Turner [facility]

## 2021-06-07 ENCOUNTER — ANTICOAGULATION VISIT (OUTPATIENT)
Dept: PHARMACY | Facility: HOSPITAL | Age: 61
End: 2021-06-07

## 2021-06-07 DIAGNOSIS — Z95.2 HX OF AORTIC VALVE REPLACEMENT, MECHANICAL: Primary | ICD-10-CM

## 2021-06-07 LAB
INR PPP: 2.8 (ref 0.91–1.09)
PROTHROMBIN TIME: 33.6 SECONDS (ref 10–13.8)

## 2021-06-07 PROCEDURE — 85610 PROTHROMBIN TIME: CPT

## 2021-06-07 PROCEDURE — 36416 COLLJ CAPILLARY BLOOD SPEC: CPT

## 2021-06-07 NOTE — PROGRESS NOTES
Anticoagulation Clinic Progress Note    Anticoagulation Summary  As of 2021    INR goal:  2.5-3.5   TTR:  43.3 % (2.7 y)   INR used for dosin.8 (2021)   Warfarin maintenance plan:  10 mg every Mon, Wed, Fri; 7.5 mg all other days   Weekly warfarin total:  60 mg   No change documented:  Aleta Sood   Plan last modified:  Martha Valle RPH (2020)   Next INR check:  2021   Priority:  Maintenance   Target end date:  Indefinite    Indications    Hx of aortic valve replacement  mechanical [Z95.2] [Z95.2]             Anticoagulation Episode Summary     INR check location:      Preferred lab:      Send INR reminders to:  MARIANNE BILLS [facility]    Comments:        Anticoagulation Care Providers     Provider Role Specialty Phone number    Kitty Lagunas MD Referring Cardiology [phone]          Clinic Interview:  Patient Findings     Negatives:  Signs/symptoms of thrombosis, Signs/symptoms of bleeding,   Laboratory test error suspected, Change in health, Change in alcohol use,   Change in activity, Upcoming invasive procedure, Emergency department   visit, Upcoming dental procedure, Missed doses, Extra doses, Change in   medications, Change in diet/appetite, Hospital admission, Bruising, Other   complaints      Clinical Outcomes     Negatives:  Major bleeding event, Thromboembolic event,   Anticoagulation-related hospital admission, Anticoagulation-related ED   visit, Anticoagulation-related fatality        INR History:  Anticoagulation Monitoring 2021   INR 3.6 3.7 2.8   INR Date 2021   INR Goal 2.5-3.5 2.5-3.5 2.5-3.5   Trend Same Same Same   Last Week Total 60 mg 60 mg 60 mg   Next Week Total 60 mg 60 mg 60 mg   Sun 7.5 mg 7.5 mg 7.5 mg   Mon 10 mg 10 mg 10 mg   Tue 7.5 mg 7.5 mg 7.5 mg   Wed 10 mg 10 mg 10 mg   Thu 7.5 mg 7.5 mg 7.5 mg   Fri 10 mg 10 mg 10 mg   Sat 7.5 mg 7.5 mg 7.5 mg   Visit Report - - -   Some recent data might be  hidden       Plan:  1. INR is therapeutic today- see above in Anticoagulation Summary.   Will instruct Stew Cabral to continue their warfarin regimen- see above in Anticoagulation Summary.  2. Follow up in 4 weeks.  3. Patient declines warfarin refills.  4. Verbal and written information provided. Patient expresses understanding and has no further questions at this time.    Aleta Sood

## 2021-07-05 ENCOUNTER — ANTICOAGULATION VISIT (OUTPATIENT)
Dept: PHARMACY | Facility: HOSPITAL | Age: 61
End: 2021-07-05

## 2021-07-05 DIAGNOSIS — Z95.2 HX OF AORTIC VALVE REPLACEMENT, MECHANICAL: Primary | ICD-10-CM

## 2021-07-05 LAB
INR PPP: 4.1 (ref 0.91–1.09)
PROTHROMBIN TIME: 49.1 SECONDS (ref 10–13.8)

## 2021-07-05 PROCEDURE — 36416 COLLJ CAPILLARY BLOOD SPEC: CPT

## 2021-07-05 PROCEDURE — G0463 HOSPITAL OUTPT CLINIC VISIT: HCPCS

## 2021-07-05 PROCEDURE — 85610 PROTHROMBIN TIME: CPT

## 2021-07-05 RX ORDER — WARFARIN SODIUM 5 MG/1
TABLET ORAL
Qty: 155 TABLET | Refills: 1 | Status: SHIPPED | OUTPATIENT
Start: 2021-07-05 | End: 2022-01-19

## 2021-07-05 NOTE — PROGRESS NOTES
Anticoagulation Clinic Progress Note    Anticoagulation Summary  As of 2021    INR goal:  2.5-3.5   TTR:  43.6 % (2.8 y)   INR used for dosin.1 (2021)   Warfarin maintenance plan:  10 mg every Mon, Wed, Fri; 7.5 mg all other days   Weekly warfarin total:  60 mg   Plan last modified:  Martha Valle RPH (2020)   Next INR check:  2021   Priority:  Maintenance   Target end date:  Indefinite    Indications    Hx of aortic valve replacement  mechanical [Z95.2] [Z95.2]             Anticoagulation Episode Summary     INR check location:      Preferred lab:      Send INR reminders to:   MADALYN BILLS [facility] PO[facility]    Comments:        Anticoagulation Care Providers     Provider Role Specialty Phone number    Kitty Lagunas MD Referring Cardiology [phone]        Clinic Interview:  Patient Findings     Positives:  Extra doses, Change in diet/appetite    Negatives:  Signs/symptoms of thrombosis, Signs/symptoms of bleeding,   Laboratory test error suspected, Change in health, Change in alcohol use,   Change in activity, Upcoming invasive procedure, Emergency department   visit, Upcoming dental procedure, Missed doses, Change in medications,   Hospital admission, Bruising, Other complaints    Comments:  Ate some trail mix at work that contained dried fruit.  Also,   may have taken a 10mg dose on a 7.5mg day but this is not certain.  Denies   any bleeding at this time      Clinical Outcomes     Negatives:  Major bleeding event, Thromboembolic event,   Anticoagulation-related hospital admission, Anticoagulation-related ED   visit, Anticoagulation-related fatality     INR History:  Anticoagulation Monitoring 2021   INR 3.7 2.8 4.1   INR Date 2021   INR Goal 2.5-3.5 2.5-3.5 2.5-3.5   Trend Same Same Same   Last Week Total 60 mg 60 mg 60 mg   Next Week Total 60 mg 60 mg 55 mg   Sun 7.5 mg 7.5 mg 7.5 mg   Mon 10 mg 10 mg 5 mg ()   Tue 7.5 mg 7.5 mg 7.5 mg      Wed 10 mg 10 mg 10 mg   Thu 7.5 mg 7.5 mg 7.5 mg   Fri 10 mg 10 mg 10 mg   Sat 7.5 mg 7.5 mg 7.5 mg   Visit Report - - -   Some recent data might be hidden     Plan:  1. INR is Supratherapeutic today- see above in Anticoagulation Summary.  Will instruct Stew Cabral to Change their warfarin regimen- see above in Anticoagulation Summary (5mg today vs usual 10mg dose).  A similar Warfarin dosing strategy has previously resulted in a therapeutic INR (12/2018).  Patient agrees to contact MD immediately for any bleeding, falls, or bumps to the head.    2. Follow up in 1 week  3. Patient desires warfarin refills and refills sent to: [id number] - [facility] [address] [place] [address] AT [facility] [address] [place] [phone] [facility] [phone]    4. Verbal and written information provided. Patient expresses understanding and has no further questions at this time.    Steve Perea, ChaparroD

## 2021-07-12 ENCOUNTER — ANTICOAGULATION VISIT (OUTPATIENT)
Dept: PHARMACY | Facility: HOSPITAL | Age: 61
End: 2021-07-12

## 2021-07-12 DIAGNOSIS — Z95.2 HX OF AORTIC VALVE REPLACEMENT, MECHANICAL: Primary | ICD-10-CM

## 2021-07-12 LAB
INR PPP: 3 (ref 0.91–1.09)
PROTHROMBIN TIME: 35.8 SECONDS (ref 10–13.8)

## 2021-07-12 PROCEDURE — 85610 PROTHROMBIN TIME: CPT

## 2021-07-12 PROCEDURE — 36416 COLLJ CAPILLARY BLOOD SPEC: CPT

## 2021-07-12 NOTE — PROGRESS NOTES
Anticoagulation Clinic Progress Note    Anticoagulation Summary  As of 7/12/2021    INR goal:  2.5-3.5   TTR:  43.6 % (2.8 y)   INR used for dosing:  3.0 (7/12/2021)   Warfarin maintenance plan:  10 mg every Mon, Wed, Fri; 7.5 mg all other days   Weekly warfarin total:  60 mg   No change documented:  Aleta Sood   Plan last modified:  Martha Valle RPH (11/23/2020)   Next INR check:  7/26/2021   Priority:  Maintenance   Target end date:  Indefinite    Indications    Hx of aortic valve replacement  mechanical [Z95.2] [Z95.2]             Anticoagulation Episode Summary     INR check location:      Preferred lab:      Send INR reminders to:  MARIANNE BILLS [facility] PO[facility]    Comments:        Anticoagulation Care Providers     Provider Role Specialty Phone number    Kitty Lagunas MD Referring Cardiology [phone]          Clinic Interview:  Patient Findings     Negatives:  Signs/symptoms of thrombosis, Signs/symptoms of bleeding,   Laboratory test error suspected, Change in health, Change in alcohol use,   Change in activity, Upcoming invasive procedure, Emergency department   visit, Upcoming dental procedure, Missed doses, Extra doses, Change in   medications, Change in diet/appetite, Hospital admission, Bruising, Other   complaints      Clinical Outcomes     Negatives:  Major bleeding event, Thromboembolic event,   Anticoagulation-related hospital admission, Anticoagulation-related ED   visit, Anticoagulation-related fatality        INR History:  Anticoagulation Monitoring 6/7/2021 7/5/2021 7/12/2021   INR 2.8 4.1 3.0   INR Date 6/7/2021 7/5/2021 7/12/2021   INR Goal 2.5-3.5 2.5-3.5 2.5-3.5   Trend Same Same Same   Last Week Total 60 mg 60 mg 47.5 mg   Next Week Total 60 mg 55 mg 60 mg   Sun 7.5 mg 7.5 mg 7.5 mg   Mon 10 mg 5 mg (7/5) 10 mg   Tue 7.5 mg 7.5 mg 7.5 mg   Wed 10 mg 10 mg 10 mg   Thu 7.5 mg 7.5 mg 7.5 mg   Fri 10 mg 10 mg 10 mg   Sat 7.5 mg 7.5 mg 7.5 mg   Visit Report - - -   Some recent data  might be hidden       Plan:  1. INR is therapeutic today- see above in Anticoagulation Summary.   Will instruct Stew Cabral to continue their warfarin regimen- see above in Anticoagulation Summary.  2. Follow up in 2 weeks.  3. Patient declines warfarin refills.  4. Verbal and written information provided. Patient expresses understanding and has no further questions at this time.    Aleta Sood

## 2021-07-26 ENCOUNTER — ANTICOAGULATION VISIT (OUTPATIENT)
Dept: PHARMACY | Facility: HOSPITAL | Age: 61
End: 2021-07-26

## 2021-07-26 DIAGNOSIS — Z95.2 HX OF AORTIC VALVE REPLACEMENT, MECHANICAL: Primary | ICD-10-CM

## 2021-07-26 LAB
INR PPP: 3.3 (ref 0.91–1.09)
PROTHROMBIN TIME: 39.2 SECONDS (ref 10–13.8)

## 2021-07-26 PROCEDURE — 85610 PROTHROMBIN TIME: CPT

## 2021-07-26 PROCEDURE — 36416 COLLJ CAPILLARY BLOOD SPEC: CPT

## 2021-07-26 NOTE — PROGRESS NOTES
Anticoagulation Clinic Progress Note    Anticoagulation Summary  As of 7/26/2021    INR goal:  2.5-3.5   TTR:  44.4 % (2.8 y)   INR used for dosing:  3.3 (7/26/2021)   Warfarin maintenance plan:  10 mg every Mon, Wed, Fri; 7.5 mg all other days   Weekly warfarin total:  60 mg   No change documented:  Delmy Severino   Plan last modified:  Martha Valle [facility] (11/23/2020)   Next INR check:  8/23/2021   Priority:  Maintenance   Target end date:  Indefinite    Indications    Hx of aortic valve replacement  mechanical [Z95.2] [Z95.2]             Anticoagulation Episode Summary     INR check location:      Preferred lab:      Send INR reminders to:   MADALYN BILLS [facility]    Comments:        Anticoagulation Care Providers     Provider Role Specialty Phone number    Kitty Lagunas MD Referring Cardiology [phone]          Clinic Interview:      INR History:  Anticoagulation Monitoring 7/5/2021 7/12/2021 7/26/2021   INR 4.1 3.0 3.3   INR Date 7/5/2021 7/12/2021 7/26/2021   INR Goal 2.5-3.5 2.5-3.5 2.5-3.5   Trend Same Same Same   Last Week Total 60 mg 47.5 mg 60 mg   Next Week Total 55 mg 60 mg 60 mg   Sun 7.5 mg 7.5 mg 7.5 mg   Mon 5 mg (7/5) 10 mg 10 mg   Tue 7.5 mg 7.5 mg 7.5 mg   Wed 10 mg 10 mg 10 mg   Thu 7.5 mg 7.5 mg 7.5 mg   Fri 10 mg 10 mg 10 mg   Sat 7.5 mg 7.5 mg 7.5 mg   Visit Report - - -   Some recent data might be hidden       Plan:  1. INR is therapeutic today- see above in Anticoagulation Summary.   Will instruct Stew Cabral to continue their warfarin regimen- see above in Anticoagulation Summary.  2. Follow up in 4 weeks.  3. Patient declines warfarin refills.  4. Verbal and written information provided. Patient expresses understanding and has no further questions at this time.    Delmy Severino

## 2021-08-23 ENCOUNTER — ANTICOAGULATION VISIT (OUTPATIENT)
Dept: PHARMACY | Facility: HOSPITAL | Age: 61
End: 2021-08-23

## 2021-08-23 DIAGNOSIS — Z95.2 HX OF AORTIC VALVE REPLACEMENT, MECHANICAL: Primary | ICD-10-CM

## 2021-08-23 LAB
INR PPP: 4.1 (ref 0.91–1.09)
PROTHROMBIN TIME: 49.4 SECONDS (ref 10–13.8)

## 2021-08-23 PROCEDURE — 85610 PROTHROMBIN TIME: CPT

## 2021-08-23 PROCEDURE — G0463 HOSPITAL OUTPT CLINIC VISIT: HCPCS

## 2021-08-23 PROCEDURE — 36416 COLLJ CAPILLARY BLOOD SPEC: CPT

## 2021-08-23 NOTE — PROGRESS NOTES
I have supervised and reviewed the notes, assessments, and/or procedures performed by our Pharmacy Intern. The documented assessment and plan were developed cooperatively, and the plan was implemented in my presence. I concur with the documentation of this patient encounter.    Gael Turner, PharmD

## 2021-08-23 NOTE — PROGRESS NOTES
Anticoagulation Clinic Progress Note    Anticoagulation Summary  As of 2021    INR goal:  2.5-3.5   TTR:  43.9 % (2.9 y)   INR used for dosin.1 (2021)   Warfarin maintenance plan:  10 mg every Mon, Wed, Fri; 7.5 mg all other days   Weekly warfarin total:  60 mg   Plan last modified:  Martha Valle RPH (2020)   Next INR check:  2021   Priority:  Maintenance   Target end date:  Indefinite    Indications    Hx of aortic valve replacement  mechanical [Z95.2] [Z95.2]             Anticoagulation Episode Summary     INR check location:      Preferred lab:      Send INR reminders to:   MADALYN BILLS [facility]INICAL POOL    Comments:        Anticoagulation Care Providers     Provider Role Specialty Phone number    Kitty Lagunas MD Referring Cardiology [phone]          Clinic Interview:  Patient Findings     Negatives:  Signs/symptoms of thrombosis, Signs/symptoms of bleeding,   Laboratory test error suspected, Change in health, Change in alcohol use,   Change in activity, Upcoming invasive procedure, Emergency department   visit, Upcoming dental procedure, Missed doses, Extra doses, Change in   medications, Change in diet/appetite, Hospital admission, Bruising, Other   complaints      Clinical Outcomes     Negatives:  Major bleeding event, Thromboembolic event,   Anticoagulation-related hospital admission, Anticoagulation-related ED   visit, Anticoagulation-related fatality        INR History:  Anticoagulation Monitoring 2021   INR 3.0 3.3 4.1   INR Date 2021   INR Goal 2.5-3.5 2.5-3.5 2.5-3.5   Trend Same Same Same   Last Week Total 47.5 mg 60 mg 60 mg   Next Week Total 60 mg 60 mg 55 mg   Sun 7.5 mg 7.5 mg 7.5 mg   Mon 10 mg 10 mg 5 mg ()   Tue 7.5 mg 7.5 mg 7.5 mg   Wed 10 mg 10 mg 10 mg   Thu 7.5 mg 7.5 mg 7.5 mg   Fri 10 mg 10 mg 10 mg   Sat 7.5 mg 7.5 mg 7.5 mg   Visit Report - - -   Some recent data might be hidden       Plan:  1. INR is  Supratherapeutic today- see above in Anticoagulation Summary.  Will instruct Stew Cabral to Change their warfarin regimen- see above in Anticoagulation Summary.  2. Follow up in 1 week  3. Patient declines warfarin refills.  4. Verbal and written information provided. Patient expresses understanding and has no further questions at this time.    Tamya Pipkin, Pharmacy Intern

## 2021-09-15 ENCOUNTER — TELEPHONE (OUTPATIENT)
Dept: PHARMACY | Facility: HOSPITAL | Age: 61
End: 2021-09-15

## 2021-10-04 ENCOUNTER — ANTICOAGULATION VISIT (OUTPATIENT)
Dept: PHARMACY | Facility: HOSPITAL | Age: 61
End: 2021-10-04

## 2021-10-04 DIAGNOSIS — Z95.2 HX OF AORTIC VALVE REPLACEMENT, MECHANICAL: Primary | ICD-10-CM

## 2021-10-04 LAB
INR PPP: 3.8 (ref 0.91–1.09)
PROTHROMBIN TIME: 46.2 SECONDS (ref 10–13.8)

## 2021-10-04 PROCEDURE — 85610 PROTHROMBIN TIME: CPT

## 2021-10-04 PROCEDURE — 36416 COLLJ CAPILLARY BLOOD SPEC: CPT

## 2021-10-04 PROCEDURE — G0463 HOSPITAL OUTPT CLINIC VISIT: HCPCS

## 2021-10-04 NOTE — PROGRESS NOTES
Anticoagulation Clinic Progress Note    Anticoagulation Summary  As of 10/4/2021    INR goal:  2.5-3.5   TTR:  42.2 % (3 y)   INR used for dosing:  3.8 (10/4/2021)   Warfarin maintenance plan:  10 mg every Mon, Fri; 7.5 mg all other days   Weekly warfarin total:  57.5 mg   Plan last modified:  Gael Turner, PharmD (10/4/2021)   Next INR check:  10/20/2021   Priority:  Maintenance   Target end date:  Indefinite    Indications    Hx of aortic valve replacement  mechanical [Z95.2] [Z95.2]             Anticoagulation Episode Summary     INR check location:      Preferred lab:      Send INR reminders to:   MADALYN BILLS [facility] [place]    Comments:        Anticoagulation Care Providers     Provider Role Specialty Phone number    Kitty Lagunas MD Referring Cardiology [phone]          Clinic Interview:  Patient Findings     Positives:  Change in alcohol use    Negatives:  Signs/symptoms of thrombosis, Signs/symptoms of bleeding,   Laboratory test error suspected, Change in health, Change in activity,   Upcoming invasive procedure, Emergency department visit, Upcoming dental   procedure, Missed doses, Extra doses, Change in medications, Change in   diet/appetite, Hospital admission, Bruising, Other complaints    Comments:  Reports EtOH over weekend, which he reports will be consistent   during football season.       Clinical Outcomes     Negatives:  Major bleeding event, Thromboembolic event,   Anticoagulation-related hospital admission, Anticoagulation-related ED   visit, Anticoagulation-related fatality    Comments:  Reports EtOH over weekend, which he reports will be consistent   during football season.         INR History:  Anticoagulation Monitoring 7/26/2021 8/23/2021 10/4/2021   INR 3.3 4.1 3.8   INR Date 7/26/2021 8/23/2021 10/4/2021   INR Goal 2.5-3.5 2.5-3.5 2.5-3.5   Trend Same Same Down   Last Week Total 60 mg 60 mg 60 mg   Next Week Total 60 mg 55 mg 57.5 mg   Sun 7.5 mg 7.5 mg 7.5 mg   Mon 10 mg 5 mg  (8/23) 10 mg   Tue 7.5 mg 7.5 mg 7.5 mg   Wed 10 mg 10 mg 7.5 mg   Thu 7.5 mg 7.5 mg 7.5 mg   Fri 10 mg 10 mg 10 mg   Sat 7.5 mg 7.5 mg 7.5 mg   Visit Report - - -   Some recent data might be hidden       Plan:  1. INR is Supratherapeutic today- see above in Anticoagulation Summary.  Will instruct Stew GABINO Cabral to Change their warfarin regimen- see above in Anticoagulation Summary.  2. Follow up in 2 weeks  3. Patient declines warfarin refills.  4. Verbal and written information provided. Patient expresses understanding and has no further questions at this time.    Gael Turner, PharmD

## 2021-10-11 ENCOUNTER — APPOINTMENT (OUTPATIENT)
Dept: PHARMACY | Facility: HOSPITAL | Age: 61
End: 2021-10-11

## 2021-10-22 ENCOUNTER — ANTICOAGULATION VISIT (OUTPATIENT)
Dept: PHARMACY | Facility: HOSPITAL | Age: 61
End: 2021-10-22

## 2021-10-22 DIAGNOSIS — Z95.2 HX OF AORTIC VALVE REPLACEMENT, MECHANICAL: Primary | ICD-10-CM

## 2021-10-22 LAB
INR PPP: 1.6 (ref 0.91–1.09)
PROTHROMBIN TIME: 19.6 SECONDS (ref 10–13.8)

## 2021-10-22 PROCEDURE — 36416 COLLJ CAPILLARY BLOOD SPEC: CPT

## 2021-10-22 PROCEDURE — 85610 PROTHROMBIN TIME: CPT

## 2021-10-22 NOTE — PROGRESS NOTES
Anticoagulation Clinic Progress Note    Anticoagulation Summary  As of 10/22/2021    INR goal:  2.5-3.5   TTR:  42.2 % (3.1 y)   INR used for dosin.6 (10/22/2021)   Warfarin maintenance plan:  10 mg every Mon, Fri; 7.5 mg all other days   Weekly warfarin total:  57.5 mg   No change documented:  Braeden Garcia, [facility]   Plan last modified:  Gael Turner, PharmD (10/4/2021)   Next INR check:  2021   Priority:  Maintenance   Target end date:  Indefinite    Indications    Hx of aortic valve replacement  mechanical [Z95.2] [Z95.2]             Anticoagulation Episode Summary     INR check location:      Preferred lab:      Send INR reminders to:   MADALYN BILLS [facility] [place]    Comments:        Anticoagulation Care Providers     Provider Role Specialty Phone number    Kitty Lagunas MD Referring Cardiology [phone]          Clinic Interview:  Patient Findings     Negatives:  Signs/symptoms of thrombosis, Signs/symptoms of bleeding,   Laboratory test error suspected, Change in health, Change in alcohol use,   Change in activity, Upcoming invasive procedure, Emergency department   visit, Upcoming dental procedure, Missed doses, Extra doses, Change in   medications, Change in diet/appetite, Hospital admission, Bruising, Other   complaints     Comments: 10/22: INR 1.6 pt reports taking 10 mg only on Mon instead of Mon/Frid, will proceed with previous plan of 10 mg W/F and 7.5 mg AODs, f/u in 2 wcks     Clinical Outcomes     Negatives:  Major bleeding event, Thromboembolic event,   Anticoagulation-related hospital admission, Anticoagulation-related ED   visit, Anticoagulation-related fatality       INR History:  Anticoagulation Monitoring 2021 10/4/2021 10/22/2021   INR 4.1 3.8 1.6   INR Date 2021 10/4/2021 10/22/2021   INR Goal 2.5-3.5 2.5-3.5 2.5-3.5   Trend Same Down Same   Last Week Total 60 mg 60 mg 55 mg   Next Week Total 55 mg 57.5 mg 57.5 mg   Sun 7.5 mg 7.5 mg 7.5 mg   Mon 5 mg () 10 mg 10  mg   Tue 7.5 mg 7.5 mg 7.5 mg   Wed 10 mg 7.5 mg 7.5 mg   Thu 7.5 mg 7.5 mg 7.5 mg   Fri 10 mg 10 mg 10 mg   Sat 7.5 mg 7.5 mg 7.5 mg   Visit Report - - -   Some recent data might be hidden       Plan:  1. INR is Subtherapeutic today- see above in Anticoagulation Summary.  Will instruct Stew Cabral to Continue their warfarin regimen of 10 mg MON/WED and 7.5 mg AODs.  2. Follow up in 2 weeks  3. Patient declines warfarin refills.  4. Verbal and written information provided. Patient expresses understanding and has no further questions at this time.    Braeden Garcia, PharmD  Pharmacy Resident

## 2021-10-22 NOTE — PROGRESS NOTES
Edits to documentation:     Patient had been taking only 10 mg on Monday versus 10 mg on Monday and Friday. Recommended patient to start taking 10 mg on Friday as well. Therefore TWD was 55 mg versus 57.5 mg.     I have supervised and reviewed the notes, assessments, and/or procedures performed by our Pharmacy Resident.  I concur with the documentation of this patient encounter.

## 2021-11-05 ENCOUNTER — ANTICOAGULATION VISIT (OUTPATIENT)
Dept: PHARMACY | Facility: HOSPITAL | Age: 61
End: 2021-11-05

## 2021-11-05 DIAGNOSIS — Z95.2 HX OF AORTIC VALVE REPLACEMENT, MECHANICAL: Primary | ICD-10-CM

## 2021-11-05 LAB
INR PPP: 3.2 (ref 0.91–1.09)
PROTHROMBIN TIME: 38.4 SECONDS (ref 10–13.8)

## 2021-11-05 PROCEDURE — 85610 PROTHROMBIN TIME: CPT

## 2021-11-05 PROCEDURE — 36416 COLLJ CAPILLARY BLOOD SPEC: CPT

## 2021-11-05 NOTE — PROGRESS NOTES
Anticoagulation Clinic Progress Note    Anticoagulation Summary  As of 11/5/2021    INR goal:  2.5-3.5   TTR:  42.3 % (3.1 y)   INR used for dosing:  3.2 (11/5/2021)   Warfarin maintenance plan:  10 mg every Mon, Fri; 7.5 mg all other days   Weekly warfarin total:  57.5 mg   No change documented:  Ventura Dee, Pharmacy Intern   Plan last modified:  Gael Turner, PharmD (10/4/2021)   Next INR check:  11/19/2021   Priority:  Maintenance   Target end date:  Indefinite    Indications    Hx of aortic valve replacement  mechanical [Z95.2] [Z95.2]             Anticoagulation Episode Summary     INR check location:      Preferred lab:      Send INR reminders to:   [facility]ILLS [facility]    Comments:        Anticoagulation Care Providers     Provider Role Specialty Phone number    Kitty Lagunas MD Referring Cardiology [phone]          Clinic Interview:  Patient Findings     Positives:  Change in alcohol use, Other complaints    Negatives:  Signs/symptoms of thrombosis, Signs/symptoms of bleeding,   Laboratory test error suspected, Change in health, Change in activity,   Upcoming invasive procedure, Emergency department visit, Upcoming dental   procedure, Missed doses, Extra doses, Change in medications, Change in   diet/appetite, Hospital admission, Bruising    Comments:  Patient reported having 6-7 beers last night so took 2.5 mg of   Warfarin instead of 7.5      Clinical Outcomes     Negatives:  Major bleeding event, Thromboembolic event,   Anticoagulation-related hospital admission, Anticoagulation-related ED   visit, Anticoagulation-related fatality    Comments:  Patient reported having 6-7 beers last night so took 2.5 mg of   Warfarin instead of 7.5        INR History:  Anticoagulation Monitoring 10/4/2021 10/22/2021 11/5/2021   INR 3.8 1.6 3.2   INR Date 10/4/2021 10/22/2021 11/5/2021   INR Goal 2.5-3.5 2.5-3.5 2.5-3.5   Trend Down Same Same   Last Week Total 60 mg 55 mg 52.5 mg   Next Week Total 57.5 mg  57.5 mg 57.5 mg   Sun 7.5 mg 7.5 mg 7.5 mg   Mon 10 mg 10 mg 10 mg   Tue 7.5 mg 7.5 mg 7.5 mg   Wed 7.5 mg 7.5 mg 7.5 mg   Thu 7.5 mg 7.5 mg 7.5 mg   Fri 10 mg 10 mg 10 mg   Sat 7.5 mg 7.5 mg 7.5 mg   Visit Report - - -   Some recent data might be hidden       Plan:  1. INR is Therapeutic today- see above in Anticoagulation Summary.  2. Patient reported drinking 6-7 beers last night then only took 2.5 mg of Warfarin instead of 7.5 mg. Counseled patient on consistency with EtOH intake as well as not self-dosing Warfarin. Will continue current regimen and recheck INR in 2 weeks.  Will instruct Stew Cabral to Continue their warfarin regimen- see above in Anticoagulation Summary.  3. Follow up in 2 weeks  4. Patient declines warfarin refills.  5. Verbal and written information provided. Patient expresses understanding and has no further questions at this time.    Ventura Dee, Pharmacy Intern

## 2021-11-19 ENCOUNTER — ANTICOAGULATION VISIT (OUTPATIENT)
Dept: PHARMACY | Facility: HOSPITAL | Age: 61
End: 2021-11-19

## 2021-11-19 DIAGNOSIS — Z95.2 HX OF AORTIC VALVE REPLACEMENT, MECHANICAL: Primary | ICD-10-CM

## 2021-11-19 LAB
INR PPP: 3.1 (ref 0.91–1.09)
PROTHROMBIN TIME: 37.2 SECONDS (ref 10–13.8)

## 2021-11-19 PROCEDURE — 36416 COLLJ CAPILLARY BLOOD SPEC: CPT

## 2021-11-19 PROCEDURE — 85610 PROTHROMBIN TIME: CPT

## 2021-11-19 NOTE — PROGRESS NOTES
Anticoagulation Clinic Progress Note    Anticoagulation Summary  As of 11/19/2021    INR goal:  2.5-3.5   TTR:  43.0 % (3.2 y)   INR used for dosing:  3.1 (11/19/2021)   Warfarin maintenance plan:  10 mg every Mon, Fri; 7.5 mg all other days   Weekly warfarin total:  57.5 mg   No change documented:  Merlyn Hassan RPH   Plan last modified:  Gael Turner, PharmD (10/4/2021)   Next INR check:  12/10/2021   Priority:  Maintenance   Target end date:  Indefinite    Indications    Hx of aortic valve replacement  mechanical [Z95.2] [Z95.2]             Anticoagulation Episode Summary     INR check location:      Preferred lab:      Send INR reminders to:  MARIANNE BILLS [facility]    Comments:        Anticoagulation Care Providers     Provider Role Specialty Phone number    Kitty Lagunas MD Referring Cardiology [phone]          Clinic Interview:  Patient Findings     Negatives:  Signs/symptoms of thrombosis, Signs/symptoms of bleeding,   Laboratory test error suspected, Change in health, Change in alcohol use,   Change in activity, Upcoming invasive procedure, Emergency department   visit, Upcoming dental procedure, Missed doses, Extra doses, Change in   medications, Change in diet/appetite, Hospital admission, Bruising, Other   complaints      Clinical Outcomes     Negatives:  Major bleeding event, Thromboembolic event,   Anticoagulation-related hospital admission, Anticoagulation-related ED   visit, Anticoagulation-related fatality        INR History:  Anticoagulation Monitoring 10/22/2021 11/5/2021 11/19/2021   INR 1.6 3.2 3.1   INR Date 10/22/2021 11/5/2021 11/19/2021   INR Goal 2.5-3.5 2.5-3.5 2.5-3.5   Trend Same Same Same   Last Week Total 55 mg 52.5 mg 57.5 mg   Next Week Total 57.5 mg 57.5 mg 57.5 mg   Sun 7.5 mg 7.5 mg 7.5 mg   Mon 10 mg 10 mg 10 mg   Tue 7.5 mg 7.5 mg 7.5 mg   Wed 7.5 mg 7.5 mg 7.5 mg   Thu 7.5 mg 7.5 mg 7.5 mg   Fri 10 mg 10 mg 10 mg   Sat 7.5 mg 7.5 mg 7.5 mg   Visit Report - - -    Some recent data might be hidden       Plan:  1. INR is Therapeutic today- see above in Anticoagulation Summary.  Will instruct Stew Cabral to Continue their warfarin regimen- see above in Anticoagulation Summary.  2. Follow up in 3 weeks  3. Patient declines warfarin refills.  4. Verbal and written information provided. Patient expresses understanding and has no further questions at this time.    Merlyn Hassan, [facility]

## 2021-12-10 ENCOUNTER — ANTICOAGULATION VISIT (OUTPATIENT)
Dept: PHARMACY | Facility: HOSPITAL | Age: 61
End: 2021-12-10

## 2021-12-10 ENCOUNTER — IMMUNIZATION (OUTPATIENT)
Dept: VACCINE CLINIC | Facility: HOSPITAL | Age: 61
End: 2021-12-10

## 2021-12-10 DIAGNOSIS — Z95.2 HX OF AORTIC VALVE REPLACEMENT, MECHANICAL: Primary | ICD-10-CM

## 2021-12-10 LAB
INR PPP: 2.4 (ref 0.91–1.09)
PROTHROMBIN TIME: 28.9 SECONDS (ref 10–13.8)

## 2021-12-10 PROCEDURE — 0004A HC ADM SARSCOV2 30MCG/0.3ML BOOSTER: CPT | Performed by: INTERNAL MEDICINE

## 2021-12-10 PROCEDURE — 85610 PROTHROMBIN TIME: CPT

## 2021-12-10 PROCEDURE — 91300 HC SARSCOV02 VAC 30MCG/0.3ML IM: CPT | Performed by: INTERNAL MEDICINE

## 2021-12-10 PROCEDURE — 36416 COLLJ CAPILLARY BLOOD SPEC: CPT

## 2021-12-10 NOTE — PROGRESS NOTES
Anticoagulation Clinic Progress Note    Anticoagulation Summary  As of 12/10/2021    INR goal:  2.5-3.5   TTR:  43.7 % (3.2 y)   INR used for dosin.4 (12/10/2021)   Warfarin maintenance plan:  10 mg every Mon, Fri; 7.5 mg all other days   Weekly warfarin total:  57.5 mg   No change documented:  Aleta Sood   Plan last modified:  Gael Turner, PharmD (10/4/2021)   Next INR check:  2021   Priority:  Maintenance   Target end date:  Indefinite    Indications    Hx of aortic valve replacement  mechanical [Z95.2] [Z95.2]             Anticoagulation Episode Summary     INR check location:      Preferred lab:      Send INR reminders to:  MARIANNE BILLS CLINICAL POOL    Comments:        Anticoagulation Care Providers     Provider Role Specialty Phone number    Kitty Lagunas MD Referring Cardiology [phone]          Clinic Interview:  Patient Findings     Negatives:  Signs/symptoms of thrombosis, Signs/symptoms of bleeding,   Laboratory test error suspected, Change in health, Change in alcohol use,   Change in activity, Upcoming invasive procedure, Emergency department   visit, Upcoming dental procedure, Missed doses, Extra doses, Change in   medications, Change in diet/appetite, Hospital admission, Bruising, Other   complaints      Clinical Outcomes     Negatives:  Major bleeding event, Thromboembolic event,   Anticoagulation-related hospital admission, Anticoagulation-related ED   visit, Anticoagulation-related fatality        INR History:  Anticoagulation Monitoring 2021 2021 12/10/2021   INR 3.2 3.1 2.4   INR Date 2021 2021 12/10/2021   INR Goal 2.5-3.5 2.5-3.5 2.5-3.5   Trend Same Same Same   Last Week Total 52.5 mg 57.5 mg 57.5 mg   Next Week Total 57.5 mg 57.5 mg 57.5 mg   Sun 7.5 mg 7.5 mg 7.5 mg   Mon 10 mg 10 mg 10 mg   Tue 7.5 mg 7.5 mg 7.5 mg   Wed 7.5 mg 7.5 mg 7.5 mg   Thu 7.5 mg 7.5 mg 7.5 mg   Fri 10 mg 10 mg 10 mg   Sat 7.5 mg 7.5 mg 7.5 mg   Visit Report - - -    Some recent data might be hidden       Plan:  1. INR is out of range- see above in Anticoagulation Summary.   Will instruct Stew Cabral to Continue  their warfarin regimen- see above in Anticoagulation Summary.  2. Follow up in 2.5 weeks.   3. Patient declines warfarin refills.  4. Verbal and written information provided. Patient expresses understanding and has no further questions at this time.    Aleta Sood

## 2022-01-07 ENCOUNTER — APPOINTMENT (OUTPATIENT)
Dept: CARDIOLOGY | Facility: HOSPITAL | Age: 62
End: 2022-01-07

## 2022-01-10 DIAGNOSIS — Z95.2 HISTORY OF MECHANICAL AORTIC VALVE REPLACEMENT: Primary | ICD-10-CM

## 2022-01-19 ENCOUNTER — ANTICOAGULATION VISIT (OUTPATIENT)
Dept: PHARMACY | Facility: HOSPITAL | Age: 62
End: 2022-01-19

## 2022-01-19 DIAGNOSIS — Z95.2 HX OF AORTIC VALVE REPLACEMENT, MECHANICAL: Primary | ICD-10-CM

## 2022-01-19 LAB
INR PPP: 3.3 (ref 0.91–1.09)
PROTHROMBIN TIME: 39.3 SECONDS (ref 10–13.8)

## 2022-01-19 PROCEDURE — 36416 COLLJ CAPILLARY BLOOD SPEC: CPT

## 2022-01-19 PROCEDURE — 85610 PROTHROMBIN TIME: CPT

## 2022-01-19 RX ORDER — WARFARIN SODIUM 5 MG/1
TABLET ORAL
Qty: 155 TABLET | Refills: 0 | Status: SHIPPED | OUTPATIENT
Start: 2022-01-19 | End: 2022-03-08

## 2022-01-19 NOTE — PROGRESS NOTES
Anticoagulation Clinic Progress Note    Anticoagulation Summary  As of 1/19/2022    INR goal:  2.5-3.5   TTR:  45.2 % (3.3 y)   INR used for dosing:  3.3 (1/19/2022)   Warfarin maintenance plan:  10 mg every Mon, Fri; 7.5 mg all other days   Weekly warfarin total:  57.5 mg   No change documented:  Carola Rios, PharmD   Plan last modified:  Gael Turner, PharmD (10/4/2021)   Next INR check:  2/16/2022   Priority:  Maintenance   Target end date:  Indefinite    Indications    Hx of aortic valve replacement  mechanical [Z95.2] [Z95.2]             Anticoagulation Episode Summary     INR check location:      Preferred lab:      Send INR reminders to:   MADALYN BILLS [facility]    Comments:        Anticoagulation Care Providers     Provider Role Specialty Phone number    Kitty Lagunas MD Referring Cardiology [phone]          Clinic Interview:  Patient Findings     Positives:  Change in activity    Negatives:  Signs/symptoms of thrombosis, Signs/symptoms of bleeding,   Laboratory test error suspected, Change in health, Change in alcohol use,   Upcoming invasive procedure, Emergency department visit, Upcoming dental   procedure, Missed doses, Extra doses, Change in medications, Change in   diet/appetite, Hospital admission, Bruising, Other complaints    Comments:  Less physical activity than normal with change in job status,   however activity level will increase.       Clinical Outcomes     Negatives:  Major bleeding event, Thromboembolic event,   Anticoagulation-related hospital admission, Anticoagulation-related ED   visit, Anticoagulation-related fatality    Comments:   INR History:  Anticoagulation Monitoring 11/19/2021 12/10/2021 1/19/2022   INR 3.1 2.4 3.3   INR Date 11/19/2021 12/10/2021 1/19/2022   INR Goal 2.5-3.5 2.5-3.5 2.5-3.5   Trend Same Same Same   Last Week Total 57.5 mg 57.5 mg 57.5 mg   Next Week Total 57.5 mg 57.5 mg 57.5 mg   Sun 7.5 mg 7.5 mg 7.5 mg   Mon 10 mg 10 mg 10 mg   Tue 7.5 mg  7.5 mg 7.5 mg   Wed 7.5 mg 7.5 mg 7.5 mg   Thu 7.5 mg 7.5 mg 7.5 mg   Fri 10 mg 10 mg 10 mg   Sat 7.5 mg 7.5 mg 7.5 mg   Visit Report - - -   Some recent data might be hidden       Plan:  1. INR is Therapeutic today- see above in Anticoagulation Summary.  Will instruct Stew Cabral to Continue their warfarin regimen- see above in Anticoagulation Summary.  2. Follow up in 1 month  3. Patient desires warfarin refills. Refill request has been sent to patient's requested pharmacy.   4. Verbal and written information provided. Patient expresses understanding and has no further questions at this time.    Carola Rios, PharmD

## 2022-02-03 ENCOUNTER — OFFICE VISIT (OUTPATIENT)
Dept: CARDIOLOGY | Facility: CLINIC | Age: 62
End: 2022-02-03

## 2022-02-03 ENCOUNTER — HOSPITAL ENCOUNTER (OUTPATIENT)
Dept: CARDIOLOGY | Facility: HOSPITAL | Age: 62
Discharge: HOME | End: 2022-02-03
Admitting: INTERNAL MEDICINE

## 2022-02-03 VITALS
WEIGHT: 181.4 LBS | OXYGEN SATURATION: 93 % | DIASTOLIC BLOOD PRESSURE: 78 MMHG | HEART RATE: 91 BPM | SYSTOLIC BLOOD PRESSURE: 110 MMHG | HEIGHT: 69 IN | BODY MASS INDEX: 26.87 KG/M2

## 2022-02-03 VITALS
HEIGHT: 69 IN | HEART RATE: 97 BPM | WEIGHT: 185 LBS | OXYGEN SATURATION: 99 % | DIASTOLIC BLOOD PRESSURE: 70 MMHG | BODY MASS INDEX: 27.4 KG/M2 | SYSTOLIC BLOOD PRESSURE: 120 MMHG

## 2022-02-03 DIAGNOSIS — Z95.2 HX OF AORTIC VALVE REPLACEMENT, MECHANICAL: Primary | ICD-10-CM

## 2022-02-03 DIAGNOSIS — Z95.2 HISTORY OF MECHANICAL AORTIC VALVE REPLACEMENT: ICD-10-CM

## 2022-02-03 DIAGNOSIS — I77.819 AORTIC DILATATION: ICD-10-CM

## 2022-02-03 DIAGNOSIS — Z79.01 WARFARIN ANTICOAGULATION: ICD-10-CM

## 2022-02-03 LAB
AORTIC ARCH: 2.6 CM
AORTIC DIMENSIONLESS INDEX: 0.4 (DI)
ASCENDING AORTA: 4 CM
BH CV ECHO AV AORTIC VALVE AT ACCEL TIME CALCULATED: 7700 MSEC
BH CV ECHO MEAS - AO ACC TIME: 0.08 SEC
BH CV ECHO MEAS - AO ARCH DIAM (PROXIMAL TRANS.): 2.6 CM
BH CV ECHO MEAS - AO MAX PG (FULL): 16 MMHG
BH CV ECHO MEAS - AO MAX PG: 18.2 MMHG
BH CV ECHO MEAS - AO MEAN PG (FULL): 10.1 MMHG
BH CV ECHO MEAS - AO MEAN PG: 11.5 MMHG
BH CV ECHO MEAS - AO ROOT AREA (BSA CORRECTED): 1.6
BH CV ECHO MEAS - AO ROOT AREA: 7.8 CM^2
BH CV ECHO MEAS - AO ROOT DIAM: 3.1 CM
BH CV ECHO MEAS - AO V2 MAX: 213.1 CM/SEC
BH CV ECHO MEAS - AO V2 MEAN: 165.8 CM/SEC
BH CV ECHO MEAS - AO V2 VTI: 39.4 CM
BH CV ECHO MEAS - ASC AORTA: 4 CM
BH CV ECHO MEAS - AT: 7.7 SEC
BH CV ECHO MEAS - AVA(I,A): 1.2 CM^2
BH CV ECHO MEAS - AVA(I,D): 1.2 CM^2
BH CV ECHO MEAS - AVA(V,A): 1.1 CM^2
BH CV ECHO MEAS - AVA(V,D): 1.1 CM^2
BH CV ECHO MEAS - BSA(HAYCOCK): 2 M^2
BH CV ECHO MEAS - BSA: 2 M^2
BH CV ECHO MEAS - BZI_BMI: 27.3 KILOGRAMS/M^2
BH CV ECHO MEAS - BZI_METRIC_HEIGHT: 175.3 CM
BH CV ECHO MEAS - BZI_METRIC_WEIGHT: 83.9 KG
BH CV ECHO MEAS - EDV(MOD-SP2): 69 ML
BH CV ECHO MEAS - EDV(MOD-SP4): 63 ML
BH CV ECHO MEAS - EDV(TEICH): 114.4 ML
BH CV ECHO MEAS - EF(CUBED): 70.4 %
BH CV ECHO MEAS - EF(MOD-BP): 51.8 %
BH CV ECHO MEAS - EF(MOD-SP2): 53.6 %
BH CV ECHO MEAS - EF(MOD-SP4): 52.4 %
BH CV ECHO MEAS - EF(TEICH): 61.9 %
BH CV ECHO MEAS - ESV(MOD-SP2): 32 ML
BH CV ECHO MEAS - ESV(MOD-SP4): 30 ML
BH CV ECHO MEAS - ESV(TEICH): 43.6 ML
BH CV ECHO MEAS - FS: 33.4 %
BH CV ECHO MEAS - IVS/LVPW: 1
BH CV ECHO MEAS - IVSD: 1.1 CM
BH CV ECHO MEAS - LAT PEAK E' VEL: 10.7 CM/SEC
BH CV ECHO MEAS - LV DIASTOLIC VOL/BSA (35-75): 31.5 ML/M^2
BH CV ECHO MEAS - LV MASS(C)D: 206 GRAMS
BH CV ECHO MEAS - LV MASS(C)DI: 103.1 GRAMS/M^2
BH CV ECHO MEAS - LV MAX PG: 2.1 MMHG
BH CV ECHO MEAS - LV MEAN PG: 1.4 MMHG
BH CV ECHO MEAS - LV SYSTOLIC VOL/BSA (12-30): 15 ML/M^2
BH CV ECHO MEAS - LV V1 MAX: 72.8 CM/SEC
BH CV ECHO MEAS - LV V1 MEAN: 56.2 CM/SEC
BH CV ECHO MEAS - LV V1 VTI: 14.8 CM
BH CV ECHO MEAS - LVIDD: 4.9 CM
BH CV ECHO MEAS - LVIDS: 3.3 CM
BH CV ECHO MEAS - LVLD AP2: 6.6 CM
BH CV ECHO MEAS - LVLD AP4: 6.6 CM
BH CV ECHO MEAS - LVLS AP2: 5.8 CM
BH CV ECHO MEAS - LVLS AP4: 5.6 CM
BH CV ECHO MEAS - LVOT AREA (M): 3.1 CM^2
BH CV ECHO MEAS - LVOT AREA: 3.2 CM^2
BH CV ECHO MEAS - LVOT DIAM: 2 CM
BH CV ECHO MEAS - LVPWD: 1.1 CM
BH CV ECHO MEAS - MED PEAK E' VEL: 7.1 CM/SEC
BH CV ECHO MEAS - MV A DUR: 0.08 SEC
BH CV ECHO MEAS - MV A MAX VEL: 86.8 CM/SEC
BH CV ECHO MEAS - MV DEC SLOPE: 447.7 CM/SEC^2
BH CV ECHO MEAS - MV DEC TIME: 0.16 SEC
BH CV ECHO MEAS - MV E MAX VEL: 68.4 CM/SEC
BH CV ECHO MEAS - MV E/A: 0.79
BH CV ECHO MEAS - MV MAX PG: 3.4 MMHG
BH CV ECHO MEAS - MV MEAN PG: 1.9 MMHG
BH CV ECHO MEAS - MV P1/2T MAX VEL: 76 CM/SEC
BH CV ECHO MEAS - MV P1/2T: 49.7 MSEC
BH CV ECHO MEAS - MV V2 MAX: 92.3 CM/SEC
BH CV ECHO MEAS - MV V2 MEAN: 65.5 CM/SEC
BH CV ECHO MEAS - MV V2 VTI: 26.5 CM
BH CV ECHO MEAS - MVA P1/2T LCG: 2.9 CM^2
BH CV ECHO MEAS - MVA(P1/2T): 4.4 CM^2
BH CV ECHO MEAS - MVA(VTI): 1.8 CM^2
BH CV ECHO MEAS - PA ACC TIME: 0.08 SEC
BH CV ECHO MEAS - PA MAX PG (FULL): 1.9 MMHG
BH CV ECHO MEAS - PA MAX PG: 3.3 MMHG
BH CV ECHO MEAS - PA PR(ACCEL): 44.5 MMHG
BH CV ECHO MEAS - PA V2 MAX: 90.9 CM/SEC
BH CV ECHO MEAS - PI END-D VEL: 103.2 CM/SEC
BH CV ECHO MEAS - PULM A REVS DUR: 0.09 SEC
BH CV ECHO MEAS - PULM A REVS VEL: 30.9 CM/SEC
BH CV ECHO MEAS - PULM DIAS VEL: 36.7 CM/SEC
BH CV ECHO MEAS - PULM S/D: 1.5
BH CV ECHO MEAS - PULM SYS VEL: 53.3 CM/SEC
BH CV ECHO MEAS - PVA(V,A): 2.4 CM^2
BH CV ECHO MEAS - PVA(V,D): 2.4 CM^2
BH CV ECHO MEAS - QP/QS: 0.84
BH CV ECHO MEAS - RAP SYSTOLE: 3 MMHG
BH CV ECHO MEAS - RV MAX PG: 1.4 MMHG
BH CV ECHO MEAS - RV MEAN PG: 1 MMHG
BH CV ECHO MEAS - RV V1 MAX: 58.7 CM/SEC
BH CV ECHO MEAS - RV V1 MEAN: 50.3 CM/SEC
BH CV ECHO MEAS - RV V1 VTI: 10.9 CM
BH CV ECHO MEAS - RVOT AREA: 3.7 CM^2
BH CV ECHO MEAS - RVOT DIAM: 2.2 CM
BH CV ECHO MEAS - RVSP: 16.4 MMHG
BH CV ECHO MEAS - SI(AO): 153.6 ML/M^2
BH CV ECHO MEAS - SI(CUBED): 42.2 ML/M^2
BH CV ECHO MEAS - SI(LVOT): 23.8 ML/M^2
BH CV ECHO MEAS - SI(MOD-SP2): 18.5 ML/M^2
BH CV ECHO MEAS - SI(MOD-SP4): 16.5 ML/M^2
BH CV ECHO MEAS - SI(TEICH): 35.4 ML/M^2
BH CV ECHO MEAS - SV(AO): 307 ML
BH CV ECHO MEAS - SV(CUBED): 84.4 ML
BH CV ECHO MEAS - SV(LVOT): 47.5 ML
BH CV ECHO MEAS - SV(MOD-SP2): 37 ML
BH CV ECHO MEAS - SV(MOD-SP4): 33 ML
BH CV ECHO MEAS - SV(RVOT): 39.9 ML
BH CV ECHO MEAS - SV(TEICH): 70.8 ML
BH CV ECHO MEAS - TAPSE (>1.6): 1.9 CM
BH CV ECHO MEAS - TR MAX VEL: 183.2 CM/SEC
BH CV ECHO MEASUREMENTS AVERAGE E/E' RATIO: 7.69
BH CV XLRA - RV BASE: 2.7 CM
BH CV XLRA - RV LENGTH: 7.4 CM
BH CV XLRA - RV MID: 2.1 CM
BH CV XLRA - TDI S': 9.9 CM/SEC
LEFT ATRIUM VOLUME INDEX: 18 ML/M2
LV EF 2D ECHO EST: 52 %
MAXIMAL PREDICTED HEART RATE: 160 BPM
SINUS: 3 CM
STJ: 3.5 CM
STRESS TARGET HR: 136 BPM

## 2022-02-03 PROCEDURE — 99214 OFFICE O/P EST MOD 30 MIN: CPT | Performed by: INTERNAL MEDICINE

## 2022-02-03 PROCEDURE — 93306 TTE W/DOPPLER COMPLETE: CPT | Performed by: INTERNAL MEDICINE

## 2022-02-03 PROCEDURE — 93306 TTE W/DOPPLER COMPLETE: CPT

## 2022-02-03 PROCEDURE — 93000 ELECTROCARDIOGRAM COMPLETE: CPT | Performed by: INTERNAL MEDICINE

## 2022-02-03 NOTE — PROGRESS NOTES
Subjective:     Encounter Date:02/03/2022      Patient ID: Stew Cabral is a 61 y.o. male.    Chief Complaint:  History of Present Illness    This is a 61-year-old with a history of a bicuspid aortic valve resulting in critical aortic stenosis, status post mechanical aortic valve replacement with a 25 mm ATS valve in 1/2009, who presents for follow-up.     Presents today for routine annual follow-up.  He had a repeat echocardiogram performed this morning which showed normal left ventricular systolic function wall motion with an EF of 52%, mild concentric left ventricular hypertrophy, grade 1 diastolic dysfunction, normal function of the mechanical aortic valve, normal right ventricular systolic pressure and mild dilatation of the ascending aorta measuring about 4 cm.    He overall has been doing well.  Denies any chest pain, shortness of breath, palpitations, orthopnea, near-syncope or syncope, or lower extremity edema.  He has shingles currently on the right side of his upper face.  This was associated with swelling of his right eye.  He was seen by his PCP and reports that it does not appear that it involved his eye.  He reports that overall the rash and swelling has improved.  He was told that he was too late for him to start therapy for the shingles and he is just been taking ibuprofen as needed for the pain.      Prior History:  The patient was initially followed by Dr. Michelle Arenas for his aortic stenosis.  Around the time of his aortic valve surgery he was noted to have a nonischemic cardiomyopathy with an ejection fraction of 30%.  A repeat echocardiogram following his surgery showed normalization of his ejection fraction.  I began following him in 12/2013 when he presented to [facility] care.  I set him up for repeat echocardiogram at that time to continue show normal LV function and a normally functioning aortic valve.  He had a repeat echocardiogram in 5/2016 that continued to show normal LV  function and a normal functioning aortic valve.  He initial checked his own INRs with a home monitor but became non-compliant for a period of time and was dropped by the home monitoring service.  At this point he was set up with the anticoagulation clinic.     Repeat echocardiogram in 1/2019 showed normal left ventricular systolic function with an EF of 59%, normal diastolic function, normal functioning mechanical aortic valve.     At a routine follow-up with Glory [id number] in 1/2021 was noted to have elevated blood pressures.  It is remained to his management and the recommendation was to monitor his blood pressures and work on lifestyle changes.    Review of Systems   Constitutional: Negative for malaise/fatigue.   HENT: Negative for hearing loss, hoarse voice, nosebleeds and sore throat.    Eyes: Negative for pain.   Cardiovascular: Negative for chest pain, claudication, cyanosis, dyspnea on exertion, irregular heartbeat, leg swelling, near-syncope, orthopnea, palpitations, paroxysmal nocturnal dyspnea and syncope.   Respiratory: Negative for shortness of breath and snoring.    Endocrine: Negative for cold intolerance, heat intolerance, polydipsia, polyphagia and polyuria.   Skin: Negative for itching and rash.   Musculoskeletal: Negative for arthritis, falls, joint pain, joint swelling, muscle cramps, muscle weakness and myalgias.   Gastrointestinal: Negative for constipation, diarrhea, dysphagia, heartburn, hematemesis, hematochezia, melena, nausea and vomiting.   Genitourinary: Negative for frequency, hematuria and hesitancy.   Neurological: Negative for excessive daytime sleepiness, dizziness, headaches, light-headedness, numbness and weakness.   Psychiatric/Behavioral: Negative for depression. The patient is not nervous/anxious.          Current Outpatient Medications:   •  Vyvanse 50 MG capsule, TK 1 C QAM, Disp: , Rfl:   •  warfarin (COUMADIN) 5 MG tablet, TAKE 2 TABLETS ( 10 MG) BY MOUTH ON MONDAY AND  "FRIDAYS AND ONE AND ONE-HALF (7.5 MG) TABLETS BY MOUTH ALL OTHER DAYS, Disp: 155 tablet, Rfl: 0    Past Medical History:   Diagnosis Date   • ADD (attention deficit disorder)    • Aortic valvar stenosis    • Bicuspid aortic valve    • CHF (congestive heart failure) (HCC)    • Displacement of lumbar intervertebral disc        Past Surgical History:   Procedure Laterality Date   • AORTIC VALVE REPAIR/REPLACEMENT  2009   • CORONARY ANGIOPLASTY Left     AV stenosis   • CORONARY ARTERY BYPASS GRAFT         History reviewed. No pertinent family history.    Social History     Tobacco Use   • Smoking status: Current Every Day Smoker     Packs/day: 1.00     Years: 30.00     Pack years: 30.00   • Smokeless tobacco: Never Used   • Tobacco comment: Caffiene daily   Substance Use Topics   • Alcohol use: Yes     Alcohol/week: 0.0 - 1.0 standard drinks     Comment: Rarely   • Drug use: No         ECG 12 Lead    Date/Time: 2/3/2022 9:21 AM  Performed by: Kitty Lagunas MD  Authorized by: Kitty Lagunas MD   Comparison: compared with previous ECG   Similar to previous ECG  Rhythm: sinus rhythm  Comments: Mild diffuse repolarization changes               Objective:     Visit Vitals  /78 (BP Location: Right arm, Patient Position: Sitting, Cuff Size: Adult)   Pulse 91   Ht 175.3 cm (69\")   Wt 82.3 kg (181 lb 6.4 oz)   SpO2 93%   BMI 26.79 kg/m²         Constitutional:       Appearance: Normal appearance. Well-developed.   HENT:      Head: Normocephalic and atraumatic.   Neck:      Vascular: No carotid bruit or JVD.   Pulmonary:      Effort: Pulmonary effort is normal.      Breath sounds: Normal breath sounds.   Cardiovascular:      Normal rate. Regular rhythm.      No gallop.   Pulses:     Radial: 2+ bilaterally.  Edema:     Peripheral edema absent.   Abdominal:      Palpations: Abdomen is soft.   Skin:     General: Skin is warm and dry.   Neurological:      Mental Status: Alert and oriented to person, place, and time. "             Assessment:          Diagnosis Plan   1. Hx of aortic valve replacement, mechanical [Z95.2]     2. Warfarin anticoagulation            Plan:         1.  Mechanical aortic valve replacement.  Normal function on his echocardiogram.  He is on chronic anticoagulation with warfarin which is managed by the medication management clinic.  2.  Ascending aortic dilatation.  Measuring about 4 cm on his echocardiogram today.  I reviewed his prior echocardiograms and I suspect that this was present in the past although either the measurements were not accurately performed or the ascending aorta was not well visualized.  Continue to monitor this for now.  May need to consider repeat echocardiogram next year to reevaluate it.    I will plan on seeing the patient back again in 1 year.

## 2022-02-16 ENCOUNTER — ANTICOAGULATION VISIT (OUTPATIENT)
Dept: PHARMACY | Facility: HOSPITAL | Age: 62
End: 2022-02-16

## 2022-02-16 DIAGNOSIS — Z95.2 HX OF AORTIC VALVE REPLACEMENT, MECHANICAL: Primary | ICD-10-CM

## 2022-02-16 LAB
INR PPP: 1.6 (ref 0.91–1.09)
PROTHROMBIN TIME: 19 SECONDS (ref 10–13.8)

## 2022-02-16 PROCEDURE — G0463 HOSPITAL OUTPT CLINIC VISIT: HCPCS

## 2022-02-16 PROCEDURE — 85610 PROTHROMBIN TIME: CPT

## 2022-02-16 PROCEDURE — 36416 COLLJ CAPILLARY BLOOD SPEC: CPT

## 2022-02-16 NOTE — PROGRESS NOTES
Anticoagulation Clinic Progress Note    Anticoagulation Summary  As of 2022    INR goal:  2.5-3.5   TTR:  45.3 % (3.4 y)   INR used for dosin.6 (2022)   Warfarin maintenance plan:  10 mg every Mon, Fri; 7.5 mg all other days   Weekly warfarin total:  57.5 mg   Plan last modified:  Gael Turner, PharmD (10/4/2021)   Next INR check:  3/4/2022   Priority:  Maintenance   Target end date:  Indefinite    Indications    Hx of aortic valve replacement  mechanical [Z95.2] [Z95.2]             Anticoagulation Episode Summary     INR check location:      Preferred lab:      Send INR reminders to:  MARIANNE BILLS CLINICAL POOL    Comments:        Anticoagulation Care Providers     Provider Role Specialty Phone number    Kitty Lagunas MD Referring Cardiology [phone]          Clinic Interview:  Patient Findings     Positives:  Missed doses    Negatives:  Signs/symptoms of thrombosis, Signs/symptoms of bleeding,   Laboratory test error suspected, Change in health, Change in alcohol use,   Change in activity, Upcoming invasive procedure, Emergency department   visit, Upcoming dental procedure, Extra doses, Change in medications,   Change in diet/appetite, Hospital admission, Bruising, Other complaints    Comments:  Patient reports he might have missed his dose on . He   also reports he took 10 mg this morning instead of 7.5 mg       Clinical Outcomes     Negatives:  Major bleeding event, Thromboembolic event,   Anticoagulation-related hospital admission, Anticoagulation-related ED   visit, Anticoagulation-related fatality    Comments:  Patient reports he might have missed his dose on . He   also reports he took 10 mg this morning instead of 7.5 mg         INR History:  Anticoagulation Monitoring 12/10/2021 2022 2022   INR 2.4 3.3 1.6   INR Date 12/10/2021 2022 2022   INR Goal 2.5-3.5 2.5-3.5 2.5-3.5   Trend Same Same Same   Last Week Total 57.5 mg 57.5 mg 47.5 mg   Next Week  Total 57.5 mg 57.5 mg 65 mg   Sun 7.5 mg 7.5 mg 7.5 mg   Mon 10 mg 10 mg 10 mg   Tue 7.5 mg 7.5 mg 7.5 mg   Wed 7.5 mg 7.5 mg 10 mg (2/16); Otherwise 7.5 mg   Thu 7.5 mg 7.5 mg 12.5 mg (2/17); Otherwise 7.5 mg   Fri 10 mg 10 mg 10 mg   Sat 7.5 mg 7.5 mg 7.5 mg   Visit Report - - -   Some recent data might be hidden       Plan:  1. INR is Subtherapeutic today- see above in Anticoagulation Summary.  Will instruct Stew Cabral to Increase their warfarin regimen- see above in Anticoagulation Summary.  2. Follow up in 2 weeks  3. Patient declines warfarin refills.  4. Verbal and written information provided. Patient expresses understanding and has no further questions at this time.    Merlyn Hassan [facility]

## 2022-03-08 RX ORDER — WARFARIN SODIUM 5 MG/1
TABLET ORAL
Qty: 155 TABLET | Refills: 0 | Status: SHIPPED | OUTPATIENT
Start: 2022-03-08 | End: 2022-03-15 | Stop reason: SDUPTHER

## 2022-03-15 ENCOUNTER — ANTICOAGULATION VISIT (OUTPATIENT)
Dept: PHARMACY | Facility: HOSPITAL | Age: 62
End: 2022-03-15

## 2022-03-15 DIAGNOSIS — Z95.2 HX OF AORTIC VALVE REPLACEMENT, MECHANICAL: Primary | ICD-10-CM

## 2022-03-15 LAB
INR PPP: 3.1 (ref 0.91–1.09)
PROTHROMBIN TIME: 37 SECONDS (ref 10–13.8)

## 2022-03-15 PROCEDURE — 85610 PROTHROMBIN TIME: CPT

## 2022-03-15 PROCEDURE — 36416 COLLJ CAPILLARY BLOOD SPEC: CPT

## 2022-03-15 RX ORDER — WARFARIN SODIUM 5 MG/1
TABLET ORAL
Qty: 12 TABLET | Refills: 0 | Status: SHIPPED | OUTPATIENT
Start: 2022-03-15 | End: 2022-03-18 | Stop reason: SDUPTHER

## 2022-03-15 NOTE — PROGRESS NOTES
Anticoagulation Clinic Progress Note    Anticoagulation Summary  As of 3/15/2022    INR goal:  2.5-3.5   TTR:  46.2 % (3.5 y)   INR used for dosing:  3.1 (3/15/2022)   Warfarin maintenance plan:  10 mg every Mon, Fri; 7.5 mg all other days   Weekly warfarin total:  57.5 mg   No change documented:  Gael Turner PharmD   Plan last modified:  Gael Turner PharmD (10/4/2021)   Next INR check:  3/31/2022   Priority:  Maintenance   Target end date:  Indefinite    Indications    Hx of aortic valve replacement  mechanical [Z95.2] [Z95.2]             Anticoagulation Episode Summary     INR check location:      Preferred lab:      Send INR reminders to:  MARIANNE BILLS [facility]    Comments:        Anticoagulation Care Providers     Provider Role Specialty Phone number    Kitty Lagunas MD Referring Cardiology [phone]          Clinic Interview:  Patient Findings     Positives:  Missed doses, Extra doses, Other complaints    Negatives:  Signs/symptoms of thrombosis, Signs/symptoms of bleeding,   Laboratory test error suspected, Change in health, Change in alcohol use,   Change in activity, Upcoming invasive procedure, Emergency department   visit, Upcoming dental procedure, Change in medications, Change in   diet/appetite, Hospital admission, Bruising    Comments:  Reports issue with getting his warfarin to go through   insurance (refill too soon?). Between paying cash for 6 tablets and   running out of warfarin on two occasions, he missed his warfarin on   3/10/22 and 3/14/22 and took 10 mg daily on 3/11-13.      Clinical Outcomes     Negatives:  Major bleeding event, Thromboembolic event,   Anticoagulation-related hospital admission, Anticoagulation-related ED   visit, Anticoagulation-related fatality    Comments:  Reports issue with getting his warfarin to go through   insurance (refill too soon?). Between paying cash for 6 tablets and   running out of warfarin on two occasions, he missed his warfarin on    3/10/22 and 3/14/22 and took 10 mg daily on 3/11-13.        INR History:  Anticoagulation Monitoring 1/19/2022 2/16/2022 3/15/2022   INR 3.3 1.6 3.1   INR Date 1/19/2022 2/16/2022 3/15/2022   INR Goal 2.5-3.5 2.5-3.5 2.5-3.5   Trend Same Same Same   Last Week Total 57.5 mg 47.5 mg 45 mg   Next Week Total 57.5 mg 65 mg 57.5 mg   Sun 7.5 mg 7.5 mg 7.5 mg   Mon 10 mg 10 mg 10 mg   Tue 7.5 mg 7.5 mg 7.5 mg   Wed 7.5 mg 10 mg (2/16); Otherwise 7.5 mg 7.5 mg   Thu 7.5 mg 12.5 mg (2/17); Otherwise 7.5 mg 7.5 mg   Fri 10 mg 10 mg 10 mg   Sat 7.5 mg 7.5 mg 7.5 mg   Visit Report - - -   Some recent data might be hidden       Plan:  1. INR is Therapeutic today- see above in Anticoagulation Summary.  Will instruct Stew Cabral to Continue their warfarin regimen- see above in Anticoagulation Summary.  2. Follow up in 2 weeks  3. Patient desires warfarin refills.  4. Verbal and written information provided. Patient expresses understanding and has no further questions at this time.    Gael Turner, PharmD

## 2022-03-18 RX ORDER — WARFARIN SODIUM 5 MG/1
TABLET ORAL
Qty: 150 TABLET | Refills: 1 | Status: SHIPPED | OUTPATIENT
Start: 2022-03-18 | End: 2022-10-04 | Stop reason: SDUPTHER

## 2022-03-18 NOTE — TELEPHONE ENCOUNTER
Spoke with representative at [facility]. She reports insurance is not covering the warfarin refill at present, but a reason could not be identified (refill too soon?).  She indicated she would call Mr. Cabral's insurance to see if she can prompt them to cover the refill. Left voicemail for Mr. Cabral with update.

## 2022-03-18 NOTE — TELEPHONE ENCOUNTER
"Spoke with [facility] staff -- she could not say what the issue was when Mr. Cabral had not been able to fill it last week. She indicates the prescription we sent on 3/8/22 had been \"closed out\", so she requested that we send a new warfarin refill. Warfarin refill sent to [facility] per request.   "

## 2022-03-31 ENCOUNTER — ANTICOAGULATION VISIT (OUTPATIENT)
Dept: PHARMACY | Facility: HOSPITAL | Age: 62
End: 2022-03-31

## 2022-03-31 DIAGNOSIS — Z95.2 HX OF AORTIC VALVE REPLACEMENT, MECHANICAL: Primary | ICD-10-CM

## 2022-03-31 LAB
INR PPP: 5.1 (ref 0.91–1.09)
INR PPP: 5.2 (ref 0.91–1.09)
PROTHROMBIN TIME: 61.7 SECONDS (ref 10–13.8)
PROTHROMBIN TIME: 62.3 SECONDS (ref 10–13.8)

## 2022-03-31 PROCEDURE — 85610 PROTHROMBIN TIME: CPT

## 2022-03-31 PROCEDURE — 36416 COLLJ CAPILLARY BLOOD SPEC: CPT

## 2022-03-31 PROCEDURE — G0463 HOSPITAL OUTPT CLINIC VISIT: HCPCS

## 2022-03-31 NOTE — PROGRESS NOTES
Anticoagulation Clinic Progress Note    Anticoagulation Summary  As of 3/31/2022    INR goal:  2.5-3.5   TTR:  45.9 % (3.5 y)   INR used for dosin.2 (3/31/2022)   Warfarin maintenance plan:  10 mg every Mon, Fri; 7.5 mg all other days   Weekly warfarin total:  57.5 mg   Plan last modified:  Gael Turner, PharmD (10/4/2021)   Next INR check:  2022   Priority:  Maintenance   Target end date:  Indefinite    Indications    Hx of aortic valve replacement  mechanical [Z95.2] [Z95.2]             Anticoagulation Episode Summary     INR check location:      Preferred lab:      Send INR reminders to:   MADALYN BILLS CLINICAL POOL    Comments:        Anticoagulation Care Providers     Provider Role Specialty Phone number    Kitty Lagunas MD Referring Cardiology [phone]          Clinic Interview:  Patient Findings     Positives:  Extra doses    Negatives:  Signs/symptoms of thrombosis, Signs/symptoms of bleeding,   Laboratory test error suspected, Change in health, Change in alcohol use,   Change in activity, Upcoming invasive procedure, Emergency department   visit, Upcoming dental procedure, Missed doses, Change in medications,   Change in diet/appetite, Hospital admission, Bruising, Other complaints    Comments:  Reports potentially extra tablets due to his work schedule.   Counseled on trying an alarm and only taking medicine at that time.       Clinical Outcomes     Negatives:  Major bleeding event, Thromboembolic event,   Anticoagulation-related hospital admission, Anticoagulation-related ED   visit, Anticoagulation-related fatality    Comments:  Reports potentially extra tablets due to his work schedule.   Counseled on trying an alarm and only taking medicine at that time.         INR History:  Anticoagulation Monitoring 2022 3/15/2022 3/31/2022   INR 1.6 3.1 5.2   INR Date 2022 3/15/2022 3/31/2022   INR Goal 2.5-3.5 2.5-3.5 2.5-3.5   Trend Same Same Same   Last Week Total 47.5 mg 45 mg 57.5  mg   Next Week Total 65 mg 57.5 mg 45 mg   Sun 7.5 mg 7.5 mg 7.5 mg   Mon 10 mg 10 mg 10 mg   Tue 7.5 mg 7.5 mg 7.5 mg   Wed 10 mg (2/16); Otherwise 7.5 mg 7.5 mg 7.5 mg   Thu 12.5 mg (2/17); Otherwise 7.5 mg 7.5 mg Hold (3/31); Otherwise 7.5 mg   Fri 10 mg 10 mg 5 mg (4/1); Otherwise 10 mg   Sat 7.5 mg 7.5 mg 7.5 mg   Visit Report - - -   Some recent data might be hidden       Plan:  1. INR is Supratherapeutic today- see above in Anticoagulation Summary.  Will instruct Stew Cabral to Change their warfarin regimen- see above in Anticoagulation Summary.  2. Follow up in 1.5 weeks (advised a 1 week follow up)   3. Patient declines warfarin refills.  4. Verbal and written information provided. Patient expresses understanding and has no further questions at this time.    Merlyn Hassan [facility]

## 2022-04-11 ENCOUNTER — ANTICOAGULATION VISIT (OUTPATIENT)
Dept: PHARMACY | Facility: HOSPITAL | Age: 62
End: 2022-04-11

## 2022-04-11 DIAGNOSIS — Z95.2 HX OF AORTIC VALVE REPLACEMENT, MECHANICAL: Primary | ICD-10-CM

## 2022-04-11 LAB
INR PPP: 3.4 (ref 0.91–1.09)
PROTHROMBIN TIME: 40.7 SECONDS (ref 10–13.8)

## 2022-04-11 PROCEDURE — 85610 PROTHROMBIN TIME: CPT

## 2022-04-11 PROCEDURE — 36416 COLLJ CAPILLARY BLOOD SPEC: CPT

## 2022-04-11 NOTE — PROGRESS NOTES
Anticoagulation Clinic Progress Note    Anticoagulation Summary  As of 4/11/2022    INR goal:  2.5-3.5   TTR:  45.5 % (3.6 y)   INR used for dosing:  3.4 (4/11/2022)   Warfarin maintenance plan:  10 mg every Mon, Fri; 7.5 mg all other days   Weekly warfarin total:  57.5 mg   No change documented:  Sue Woodruff   Plan last modified:  Gael Turner, PharmD (10/4/2021)   Next INR check:  4/26/2022   Priority:  Maintenance   Target end date:  Indefinite    Indications    Hx of aortic valve replacement  mechanical [Z95.2] [Z95.2]             Anticoagulation Episode Summary     INR check location:      Preferred lab:      Send INR reminders to:   [facility]    Comments:        Anticoagulation Care Providers     Provider Role Specialty Phone number    Kitty Lagunas MD Referring Cardiology [phone]          Clinic Interview:      INR History:  Anticoagulation Monitoring 3/15/2022 3/31/2022 4/11/2022   INR 3.1 5.2 3.4   INR Date 3/15/2022 3/31/2022 4/11/2022   INR Goal 2.5-3.5 2.5-3.5 2.5-3.5   Trend Same Same Same   Last Week Total 45 mg 57.5 mg 57.5 mg   Next Week Total 57.5 mg 45 mg 57.5 mg   Sun 7.5 mg 7.5 mg 7.5 mg   Mon 10 mg 10 mg 10 mg   Tue 7.5 mg 7.5 mg 7.5 mg   Wed 7.5 mg 7.5 mg 7.5 mg   Thu 7.5 mg Hold (3/31); Otherwise 7.5 mg 7.5 mg   Fri 10 mg 5 mg (4/1); Otherwise 10 mg 10 mg   Sat 7.5 mg 7.5 mg 7.5 mg   Visit Report - - -   Some recent data might be hidden       Plan:  1. INR is therapeutic today- see above in Anticoagulation Summary.   Will instruct Stew Cabral to continue their warfarin regimen- see above in Anticoagulation Summary.  2. Follow up in 2 weeks.  3. Patient declines warfarin refills.  4. Verbal and written information provided. Patient expresses understanding and has no further questions at this time.    Sue Woodruff

## 2022-05-04 ENCOUNTER — TELEPHONE (OUTPATIENT)
Dept: PHARMACY | Facility: HOSPITAL | Age: 62
End: 2022-05-04

## 2022-05-04 NOTE — TELEPHONE ENCOUNTER
Called patient regarding overdue INR. Left voicemail to schedule an appointment with the medication management clinic

## 2022-05-16 ENCOUNTER — ANTICOAGULATION VISIT (OUTPATIENT)
Dept: PHARMACY | Facility: HOSPITAL | Age: 62
End: 2022-05-16

## 2022-05-16 DIAGNOSIS — Z95.2 HX OF AORTIC VALVE REPLACEMENT, MECHANICAL: Primary | ICD-10-CM

## 2022-05-16 LAB
INR PPP: 2 (ref 0.91–1.09)
PROTHROMBIN TIME: 24.4 SECONDS (ref 10–13.8)

## 2022-05-16 PROCEDURE — G0463 HOSPITAL OUTPT CLINIC VISIT: HCPCS

## 2022-05-16 PROCEDURE — 85610 PROTHROMBIN TIME: CPT

## 2022-05-16 PROCEDURE — 36416 COLLJ CAPILLARY BLOOD SPEC: CPT

## 2022-05-16 NOTE — PROGRESS NOTES
Anticoagulation Clinic Progress Note    Anticoagulation Summary  As of 2022    INR goal:  2.5-3.5   TTR:  46.0 % (3.6 y)   INR used for dosin.0 (2022)   Warfarin maintenance plan:  10 mg every Mon, Fri; 7.5 mg all other days   Weekly warfarin total:  57.5 mg   Plan last modified:  Gael Turner, PharmD (10/4/2021)   Next INR check:  2022   Priority:  Maintenance   Target end date:  Indefinite    Indications    Hx of aortic valve replacement  mechanical [Z95.2] [Z95.2]             Anticoagulation Episode Summary     INR check location:      Preferred lab:      Send INR reminders to:   [facility]    Comments:        Anticoagulation Care Providers     Provider Role Specialty Phone number    Kitty Lagunas MD Referring Cardiology [phone]          Clinic Interview:  Patient Findings     Positives:  Missed doses, Extra doses    Negatives:  Signs/symptoms of thrombosis, Signs/symptoms of bleeding,   Laboratory test error suspected, Change in health, Change in alcohol use,   Change in activity, Upcoming invasive procedure, Emergency department   visit, Upcoming dental procedure, Change in medications, Change in   diet/appetite, Hospital admission, Bruising, Other complaints    Comments:  Missed Thursday so took 15mg Friday       Clinical Outcomes     Negatives:  Major bleeding event, Thromboembolic event,   Anticoagulation-related hospital admission, Anticoagulation-related ED   visit, Anticoagulation-related fatality    Comments:  Missed Thursday so took 15mg Friday         INR History:  Anticoagulation Monitoring 3/31/2022 2022 2022   INR 5.2 3.4 2.0   INR Date 3/31/2022 2022 2022   INR Goal 2.5-3.5 2.5-3.5 2.5-3.5   Trend Same Same Same   Last Week Total 57.5 mg 57.5 mg 55 mg   Next Week Total 45 mg 57.5 mg 60 mg   Sun 7.5 mg 7.5 mg 7.5 mg   Mon 10 mg 10 mg 10 mg   Tue 7.5 mg 7.5 mg 10 mg (); Otherwise 7.5 mg   Wed 7.5 mg 7.5 mg 7.5 mg   Thu Hold (3/31);  Otherwise 7.5 mg 7.5 mg 7.5 mg   Fri 5 mg (4/1); Otherwise 10 mg 10 mg 10 mg   Sat 7.5 mg 7.5 mg 7.5 mg   Visit Report - - -   Some recent data might be hidden       Plan:  1. INR is Subtherapeutic today- see above in Anticoagulation Summary.  Will instruct Stew Cabral to Change their warfarin regimen- see above in Anticoagulation Summary.  2. Follow up in 2 weeks  3. Patient declines warfarin refills.  4. Verbal and written information provided. Patient expresses understanding and has no further questions at this time.    Michelle Narvaez, PharmD

## 2022-05-31 ENCOUNTER — ANTICOAGULATION VISIT (OUTPATIENT)
Dept: PHARMACY | Facility: HOSPITAL | Age: 62
End: 2022-05-31

## 2022-05-31 DIAGNOSIS — Z95.2 HX OF AORTIC VALVE REPLACEMENT, MECHANICAL: Primary | ICD-10-CM

## 2022-05-31 LAB
INR PPP: 3.5 (ref 0.91–1.09)
PROTHROMBIN TIME: 42.5 SECONDS (ref 10–13.8)

## 2022-05-31 PROCEDURE — 36416 COLLJ CAPILLARY BLOOD SPEC: CPT

## 2022-05-31 PROCEDURE — 85610 PROTHROMBIN TIME: CPT

## 2022-05-31 NOTE — PROGRESS NOTES
Anticoagulation Clinic Progress Note    Anticoagulation Summary  As of 5/31/2022    INR goal:  2.5-3.5   TTR:  46.2 % (3.7 y)   INR used for dosing:  3.5 (5/31/2022)   Warfarin maintenance plan:  10 mg every Mon, Fri; 7.5 mg all other days   Weekly warfarin total:  57.5 mg   Plan last modified:  Gael Turner, PharmD (10/4/2021)   Next INR check:  6/14/2022   Priority:  Maintenance   Target end date:  Indefinite    Indications    Hx of aortic valve replacement  mechanical [Z95.2] [Z95.2]             Anticoagulation Episode Summary     INR check location:      Preferred lab:      Send INR reminders to:   [facility]    Comments:        Anticoagulation Care Providers     Provider Role Specialty Phone number    Kitty Lagunas MD Referring Cardiology [phone]          Clinic Interview:      INR History:  Anticoagulation Monitoring 4/11/2022 5/16/2022 5/31/2022   INR 3.4 2.0 3.5   INR Date 4/11/2022 5/16/2022 5/31/2022   INR Goal 2.5-3.5 2.5-3.5 2.5-3.5   Trend Same Same Same   Last Week Total 57.5 mg 55 mg 57.5 mg   Next Week Total 57.5 mg 60 mg 57.5 mg   Sun 7.5 mg 7.5 mg 7.5 mg   Mon 10 mg 10 mg 10 mg   Tue 7.5 mg 10 mg (5/17); Otherwise 7.5 mg 7.5 mg   Wed 7.5 mg 7.5 mg 7.5 mg   Thu 7.5 mg 7.5 mg 7.5 mg   Fri 10 mg 10 mg 10 mg   Sat 7.5 mg 7.5 mg 7.5 mg   Visit Report - - -   Some recent data might be hidden       Plan:  1. INR is therapeutic today- see above in Anticoagulation Summary.   Will instruct Stew Cabral to continue their warfarin regimen- see above in Anticoagulation Summary.  2. Follow up in 2 weeks.  3. Patient declines warfarin refills.  4. Verbal and written information provided. Patient expresses understanding and has no further questions at this time.    Sue Woodruff

## 2022-06-14 ENCOUNTER — ANTICOAGULATION VISIT (OUTPATIENT)
Dept: PHARMACY | Facility: HOSPITAL | Age: 62
End: 2022-06-14

## 2022-06-14 DIAGNOSIS — Z95.2 HX OF AORTIC VALVE REPLACEMENT, MECHANICAL: Primary | ICD-10-CM

## 2022-06-14 LAB
INR PPP: 4.4 (ref 0.91–1.09)
PROTHROMBIN TIME: 53 SECONDS (ref 10–13.8)

## 2022-06-14 PROCEDURE — 36416 COLLJ CAPILLARY BLOOD SPEC: CPT

## 2022-06-14 PROCEDURE — G0463 HOSPITAL OUTPT CLINIC VISIT: HCPCS

## 2022-06-14 PROCEDURE — 85610 PROTHROMBIN TIME: CPT

## 2022-06-14 NOTE — PROGRESS NOTES
Anticoagulation Clinic Progress Note    Anticoagulation Summary  As of 2022    INR goal:  2.5-3.5   TTR:  45.8 % (3.7 y)   INR used for dosin.4 (2022)   Warfarin maintenance plan:  10 mg every Mon, Fri; 7.5 mg all other days   Weekly warfarin total:  57.5 mg   Plan last modified:  Gael Turner, PharmD (10/4/2021)   Next INR check:  2022   Priority:  Maintenance   Target end date:  Indefinite    Indications    Hx of aortic valve replacement  mechanical [Z95.2] [Z95.2]             Anticoagulation Episode Summary     INR check location:      Preferred lab:      Send INR reminders to:   [facility]    Comments:        Anticoagulation Care Providers     Provider Role Specialty Phone number    Kitty Lagunas MD Referring Cardiology [phone]          Clinic Interview:  Patient Findings     Positives:  Extra doses    Negatives:  Signs/symptoms of thrombosis, Signs/symptoms of bleeding,   Laboratory test error suspected, Change in health, Change in alcohol use,   Change in activity, Upcoming invasive procedure, Emergency department   visit, Upcoming dental procedure, Missed doses, Change in medications,   Change in diet/appetite, Hospital admission, Bruising, Other complaints    Comments:  Patient reports confusion with dosing due to work shift   changed.      Clinical Outcomes     Negatives:  Major bleeding event, Thromboembolic event,   Anticoagulation-related hospital admission, Anticoagulation-related ED   visit, Anticoagulation-related fatality    Comments:  Patient reports confusion with dosing due to work shift   changed.        INR History:  Anticoagulation Monitoring 2022   INR 2.0 3.5 4.4   INR Date 2022   INR Goal 2.5-3.5 2.5-3.5 2.5-3.5   Trend Same Same Same   Last Week Total 55 mg 57.5 mg 57.5 mg   Next Week Total 60 mg 57.5 mg 50 mg   Sun 7.5 mg 7.5 mg 7.5 mg   Mon 10 mg 10 mg 10 mg   Tue 10 mg (); Otherwise 7.5 mg  7.5 mg 5 mg (6/14); Otherwise 7.5 mg   Wed 7.5 mg 7.5 mg 2.5 mg (6/15); Otherwise 7.5 mg   Thu 7.5 mg 7.5 mg 7.5 mg   Fri 10 mg 10 mg 10 mg   Sat 7.5 mg 7.5 mg 7.5 mg   Visit Report - - -   Some recent data might be hidden       Plan:  1. INR is Supratherapeutic today- see above in Anticoagulation Summary.  Will instruct Stew Cabral to Change their warfarin regimen- see above in Anticoagulation Summary. Patient already took 5 mg at 2 am today. Instructed him not to take any more Warfarin for today and take 2.5 mg tomorrow. Resume normal dosing on Thursday.  2. Follow up in 1.5 week  3. Patient desires warfarin refills.  4. Verbal and written information provided. Patient expresses understanding and has no further questions at this time.    Nahed Snow, Pharmacy Intern     Amanda Monsalve, PharmD

## 2022-06-17 ENCOUNTER — LAB REQUISITION (OUTPATIENT)
Dept: LAB | Facility: HOSPITAL | Age: 62
End: 2022-06-17

## 2022-06-17 DIAGNOSIS — Z00.00 ENCOUNTER FOR GENERAL ADULT MEDICAL EXAMINATION WITHOUT ABNORMAL FINDINGS: ICD-10-CM

## 2022-06-17 PROCEDURE — 88305 TISSUE EXAM BY PATHOLOGIST: CPT | Performed by: SPECIALIST

## 2022-06-21 LAB
LAB AP CASE REPORT: NORMAL
LAB AP CLINICAL INFORMATION: NORMAL
LAB AP DIAGNOSIS COMMENT: NORMAL
PATH REPORT.FINAL DX SPEC: NORMAL
PATH REPORT.GROSS SPEC: NORMAL

## 2022-06-24 ENCOUNTER — ANTICOAGULATION VISIT (OUTPATIENT)
Dept: PHARMACY | Facility: HOSPITAL | Age: 62
End: 2022-06-24

## 2022-06-24 DIAGNOSIS — Z95.2 HX OF AORTIC VALVE REPLACEMENT, MECHANICAL: Primary | ICD-10-CM

## 2022-06-24 LAB
INR PPP: 3.1 (ref 0.91–1.09)
PROTHROMBIN TIME: 37.7 SECONDS (ref 10–13.8)

## 2022-06-24 PROCEDURE — 36416 COLLJ CAPILLARY BLOOD SPEC: CPT

## 2022-06-24 PROCEDURE — 85610 PROTHROMBIN TIME: CPT

## 2022-06-24 NOTE — PROGRESS NOTES
Anticoagulation Clinic Progress Note    Anticoagulation Summary  As of 6/24/2022    INR goal:  2.5-3.5   TTR:  45.7 % (3.8 y)   INR used for dosing:  3.1 (6/24/2022)   Warfarin maintenance plan:  10 mg every Mon, Fri; 7.5 mg all other days   Weekly warfarin total:  57.5 mg   No change documented:  Michelle Narvaez, PharmD   Plan last modified:  Gael Turner, PharmD (10/4/2021)   Next INR check:  7/11/2022   Priority:  Maintenance   Target end date:  Indefinite    Indications    Hx of aortic valve replacement  mechanical [Z95.2] [Z95.2]             Anticoagulation Episode Summary     INR check location:      Preferred lab:      Send INR reminders to:   [facility]ADALYN BILLS [facility]    Comments:        Anticoagulation Care Providers     Provider Role Specialty Phone number    Kitty Lagunas MD Referring Cardiology [phone]          Clinic Interview:  Patient Findings     Negatives:  Signs/symptoms of thrombosis, Signs/symptoms of bleeding,   Laboratory test error suspected, Change in health, Change in alcohol use,   Change in activity, Upcoming invasive procedure, Emergency department   visit, Upcoming dental procedure, Missed doses, Extra doses, Change in   medications, Change in diet/appetite, Hospital admission, Bruising, Other   complaints    Comments:  Started setting alarm each day to make sure he doesn't skip or   take extra doses.       Clinical Outcomes     Negatives:  Major bleeding event, Thromboembolic event,   Anticoagulation-related hospital admission, Anticoagulation-related ED   visit, Anticoagulation-related fatality    Comments:  Started setting alarm each day to make sure he doesn't skip or   take extra doses.         INR History:  Anticoagulation Monitoring 5/31/2022 6/14/2022 6/24/2022   INR 3.5 4.4 3.1   INR Date 5/31/2022 6/14/2022 6/24/2022   INR Goal 2.5-3.5 2.5-3.5 2.5-3.5   Trend Same Same Same   Last Week Total 57.5 mg 57.5 mg 57.5 mg   Next Week Total 57.5 mg 50 mg 57.5 mg   Sun 7.5 mg  7.5 mg 7.5 mg   Mon 10 mg 10 mg 10 mg   Tue 7.5 mg 5 mg (6/14); Otherwise 7.5 mg 7.5 mg   Wed 7.5 mg 2.5 mg (6/15); Otherwise 7.5 mg 7.5 mg   Thu 7.5 mg 7.5 mg 7.5 mg   Fri 10 mg 10 mg 10 mg   Sat 7.5 mg 7.5 mg 7.5 mg   Visit Report - - -   Some recent data might be hidden       Plan:  1. INR is Therapeutic today- see above in Anticoagulation Summary.  Will instruct Stew LLOYD Misha to Continue their warfarin regimen- see above in Anticoagulation Summary.  2. Follow up in 2 weeks  3. Patient declines warfarin refills.  4. Verbal and written information provided. Patient expresses understanding and has no further questions at this time.    Michelle Narvaez, PharmD

## 2022-07-11 ENCOUNTER — ANTICOAGULATION VISIT (OUTPATIENT)
Dept: PHARMACY | Facility: HOSPITAL | Age: 62
End: 2022-07-11

## 2022-07-11 DIAGNOSIS — Z95.2 HX OF AORTIC VALVE REPLACEMENT, MECHANICAL: Primary | ICD-10-CM

## 2022-07-11 LAB
INR PPP: 3.8 (ref 0.91–1.09)
PROTHROMBIN TIME: 46 SECONDS (ref 10–13.8)

## 2022-07-11 PROCEDURE — 85610 PROTHROMBIN TIME: CPT

## 2022-07-11 PROCEDURE — G0463 HOSPITAL OUTPT CLINIC VISIT: HCPCS

## 2022-07-11 PROCEDURE — 36416 COLLJ CAPILLARY BLOOD SPEC: CPT

## 2022-07-11 NOTE — PROGRESS NOTES
I have supervised and reviewed the notes, assessments, and/or procedures performed by our  pharmacy student . The documented assessment and plan were developed cooperatively, and the plan was implemented in my presence. I concur with the documentation of this patient encounter.

## 2022-07-11 NOTE — PROGRESS NOTES
Anticoagulation Clinic Progress Note    Anticoagulation Summary  As of 7/11/2022    INR goal:  2.5-3.5   TTR:  45.8 % (3.8 y)   INR used for dosing:  3.8 (7/11/2022)   Warfarin maintenance plan:  10 mg every Mon, Fri; 7.5 mg all other days   Weekly warfarin total:  57.5 mg   Plan last modified:  Gael Turner, PharmD (10/4/2021)   Next INR check:  7/19/2022   Priority:  Maintenance   Target end date:  Indefinite    Indications    Hx of aortic valve replacement  mechanical [Z95.2] [Z95.2]             Anticoagulation Episode Summary     INR check location:      Preferred lab:      Send INR reminders to:   [facility]    Comments:        Anticoagulation Care Providers     Provider Role Specialty Phone number    Kitty Lagunas MD Referring Cardiology [phone]          Clinic Interview:  Patient Findings     Positives:  Change in alcohol use, Missed doses, Extra doses    Negatives:  Signs/symptoms of thrombosis, Signs/symptoms of bleeding,   Laboratory test error suspected, Change in health, Change in activity,   Upcoming invasive procedure, Emergency department visit, Upcoming dental   procedure, Change in medications, Change in diet/appetite, Hospital   admission, Bruising, Other complaints    Comments:  Patient missed dose on Sat and took 5 mg Sun (9am) and then   7.5 mg that night (10pm). He also reported drinking 5 beers on Sat.   Suspect current elevation d/t EtOH and will cont to rise from extra dose.      Clinical Outcomes     Negatives:  Major bleeding event, Thromboembolic event,   Anticoagulation-related hospital admission, Anticoagulation-related ED   visit, Anticoagulation-related fatality    Comments:  Patient missed dose on Sat and took 5 mg Sun (9am) and then   7.5 mg that night (10pm). He also reported drinking 5 beers on Sat.   Suspect current elevation d/t EtOH and will cont to rise from extra dose.        INR History:  Anticoagulation Monitoring 6/14/2022 6/24/2022 7/11/2022    INR 4.4 3.1 3.8   INR Date 6/14/2022 6/24/2022 7/11/2022   INR Goal 2.5-3.5 2.5-3.5 2.5-3.5   Trend Same Same Same   Last Week Total 57.5 mg 57.5 mg 55 mg   Next Week Total 50 mg 57.5 mg 47.5 mg   Sun 7.5 mg 7.5 mg 7.5 mg   Mon 10 mg 10 mg Hold (7/11); Otherwise 10 mg   Tue 5 mg (6/14); Otherwise 7.5 mg 7.5 mg 7.5 mg   Wed 2.5 mg (6/15); Otherwise 7.5 mg 7.5 mg 7.5 mg   Thu 7.5 mg 7.5 mg 7.5 mg   Fri 10 mg 10 mg 10 mg   Sat 7.5 mg 7.5 mg 7.5 mg   Visit Report - - -   Some recent data might be hidden       Plan:  1. INR is Supratherapeutic today- see above in Anticoagulation Summary.  Will instruct Stew GABINO Cabral to Change their warfarin regimen- see above in Anticoagulation Summary. Instructed patient to hold tonight's dose and then resume normal regimen. Consider maintenance dose reduction if INR remains high.  2. Follow up in 1 week  3. Patient declines warfarin refills.  4. Verbal and written information provided. Patient expresses understanding and has no further questions at this time.    Tanmay Carrasquillo, Pharmacy Intern

## 2022-08-02 ENCOUNTER — ANTICOAGULATION VISIT (OUTPATIENT)
Dept: PHARMACY | Facility: HOSPITAL | Age: 62
End: 2022-08-02

## 2022-08-02 DIAGNOSIS — Z95.2 HX OF AORTIC VALVE REPLACEMENT, MECHANICAL: Primary | ICD-10-CM

## 2022-08-02 LAB
INR PPP: 5.6 (ref 0.91–1.09)
PROTHROMBIN TIME: 67.5 SECONDS (ref 10–13.8)

## 2022-08-02 PROCEDURE — 85610 PROTHROMBIN TIME: CPT

## 2022-08-02 PROCEDURE — G0463 HOSPITAL OUTPT CLINIC VISIT: HCPCS

## 2022-08-02 PROCEDURE — 36416 COLLJ CAPILLARY BLOOD SPEC: CPT

## 2022-08-02 NOTE — PROGRESS NOTES
Anticoagulation Clinic Progress Note    Anticoagulation Summary  As of 2022    INR goal:  2.5-3.5   TTR:  45.1 % (3.9 y)   INR used for dosin.6 (2022)   Warfarin maintenance plan:  7.5 mg every day   Weekly warfarin total:  52.5 mg   Plan last modified:  Merlyn Hassan RPH (2022)   Next INR check:  2022   Priority:  Maintenance   Target end date:  Indefinite    Indications    Hx of aortic valve replacement  mechanical [Z95.2] [Z95.2]             Anticoagulation Episode Summary     INR check location:      Preferred lab:      Send INR reminders to:  [facility]    Comments:        Anticoagulation Care Providers     Provider Role Specialty Phone number    Kitty Lagunas MD Referring Cardiology [phone]          Clinic Interview:  Patient Findings     Positives:  Change in medications    Negatives:  Signs/symptoms of thrombosis, Signs/symptoms of bleeding,   Laboratory test error suspected, Change in health, Change in alcohol use,   Change in activity, Upcoming invasive procedure, Emergency department   visit, Upcoming dental procedure, Missed doses, Extra doses, Change in   diet/appetite, Hospital admission, Bruising, Other complaints    Comments:  Increase in vyvanse (no interaction expected)       Clinical Outcomes     Negatives:  Major bleeding event, Thromboembolic event,   Anticoagulation-related hospital admission, Anticoagulation-related ED   visit, Anticoagulation-related fatality    Comments:  Increase in vyvanse (no interaction expected)         INR History:  Anticoagulation Monitoring 2022   INR 3.1 3.8 5.6   INR Date 2022   INR Goal 2.5-3.5 2.5-3.5 2.5-3.5   Trend Same Same Down   Last Week Total 57.5 mg 55 mg 57.5 mg   Next Week Total 57.5 mg 47.5 mg 45 mg   Sun 7.5 mg 7.5 mg 7.5 mg   Mon 10 mg Hold (); Otherwise 10 mg 7.5 mg   Tue 7.5 mg 7.5 mg Hold (); Otherwise 7.5 mg   Wed 7.5 mg 7.5 mg 7.5 mg   Thu  7.5 mg 7.5 mg 7.5 mg   Fri 10 mg 10 mg 7.5 mg   Sat 7.5 mg 7.5 mg 7.5 mg   Visit Report - - -   Some recent data might be hidden       Plan:  1. INR is Supratherapeutic today- see above in Anticoagulation Summary.  Will instruct Stew Cabral to Change their warfarin regimen- see above in Anticoagulation Summary.  2. Follow up in 2 weeks to help with stress from work schedule   3. Patient declines warfarin refills.  4. Verbal and written information provided. Patient expresses understanding and has no further questions at this time.    Merlyn Hassan, [facility]

## 2022-08-16 ENCOUNTER — ANTICOAGULATION VISIT (OUTPATIENT)
Dept: PHARMACY | Facility: HOSPITAL | Age: 62
End: 2022-08-16

## 2022-08-16 DIAGNOSIS — Z95.2 HX OF AORTIC VALVE REPLACEMENT, MECHANICAL: Primary | ICD-10-CM

## 2022-08-16 LAB
INR PPP: 3.8 (ref 0.91–1.09)
PROTHROMBIN TIME: 45.3 SECONDS (ref 10–13.8)

## 2022-08-16 PROCEDURE — G0463 HOSPITAL OUTPT CLINIC VISIT: HCPCS

## 2022-08-16 PROCEDURE — 85610 PROTHROMBIN TIME: CPT

## 2022-08-16 PROCEDURE — 36416 COLLJ CAPILLARY BLOOD SPEC: CPT

## 2022-08-16 NOTE — PROGRESS NOTES
Anticoagulation Clinic Progress Note    Anticoagulation Summary  As of 8/16/2022    INR goal:  2.5-3.5   TTR:  44.7 % (3.9 y)   INR used for dosing:  3.8 (8/16/2022)   Warfarin maintenance plan:  7.5 mg every day   Weekly warfarin total:  52.5 mg   Plan last modified:  Merlyn Hassan RPH (8/2/2022)   Next INR check:  9/1/2022   Priority:  Maintenance   Target end date:  Indefinite    Indications    Hx of aortic valve replacement  mechanical [Z95.2] [Z95.2]             Anticoagulation Episode Summary     INR check location:      Preferred lab:      Send INR reminders to:  [facility]    Comments:        Anticoagulation Care Providers     Provider Role Specialty Phone number    Kitty Lagunas MD Referring Cardiology [phone]          Clinic Interview:  Patient Findings     Positives:  Extra doses    Negatives:  Signs/symptoms of thrombosis, Signs/symptoms of bleeding,   Laboratory test error suspected, Change in health, Change in alcohol use,   Change in activity, Upcoming invasive procedure, Emergency department   visit, Upcoming dental procedure, Missed doses, Change in medications,   Change in diet/appetite, Hospital admission, Bruising, Other complaints    Comments:  Reports he may have taken an extra tablet last week; no   totally confident.  Denies changes to medications/diet/ETOH or health.      Clinical Outcomes     Negatives:  Major bleeding event, Thromboembolic event,   Anticoagulation-related hospital admission, Anticoagulation-related ED   visit, Anticoagulation-related fatality    Comments:  Reports he may have taken an extra tablet last week; no   totally confident.  Denies changes to medications/diet/ETOH or health.        INR History:  Anticoagulation Monitoring 7/11/2022 8/2/2022 8/16/2022   INR 3.8 5.6 3.8   INR Date 7/11/2022 8/2/2022 8/16/2022   INR Goal 2.5-3.5 2.5-3.5 2.5-3.5   Trend Same Down Same   Last Week Total 55 mg 57.5 mg 52.5 mg   Next Week Total 47.5 mg 45 mg  50 mg   Sun 7.5 mg 7.5 mg 7.5 mg   Mon Hold (7/11); Otherwise 10 mg 7.5 mg 7.5 mg   Tue 7.5 mg Hold (8/2); Otherwise 7.5 mg 5 mg (8/16); Otherwise 7.5 mg   Wed 7.5 mg 7.5 mg 7.5 mg   Thu 7.5 mg 7.5 mg 7.5 mg   Fri 10 mg 7.5 mg 7.5 mg   Sat 7.5 mg 7.5 mg 7.5 mg   Visit Report - - -   Some recent data might be hidden       Plan:  1. INR is Supratherapeutic today- see above in Anticoagulation Summary.  Will instruct Stew Cabral to Continue their warfarin regimen- see above in Anticoagulation Summary.  Take 5 mg today only, then resume previous dosing of 7.5mg daily.  2. Follow up in 2 weeks  3. Patient declines warfarin refills.  4. Verbal and written information provided. Patient expresses understanding and has no further questions at this time.    Michelle Narvaez, PharmD

## 2022-09-15 ENCOUNTER — ANTICOAGULATION VISIT (OUTPATIENT)
Dept: PHARMACY | Facility: HOSPITAL | Age: 62
End: 2022-09-15

## 2022-09-15 DIAGNOSIS — Z95.2 HX OF AORTIC VALVE REPLACEMENT, MECHANICAL: Primary | ICD-10-CM

## 2022-09-15 LAB
INR PPP: 3.9 (ref 0.91–1.09)
PROTHROMBIN TIME: 46.2 SECONDS (ref 10–13.8)

## 2022-09-15 PROCEDURE — 85610 PROTHROMBIN TIME: CPT

## 2022-09-15 PROCEDURE — G0463 HOSPITAL OUTPT CLINIC VISIT: HCPCS

## 2022-09-15 PROCEDURE — 36416 COLLJ CAPILLARY BLOOD SPEC: CPT

## 2022-09-30 ENCOUNTER — ANTICOAGULATION VISIT (OUTPATIENT)
Dept: PHARMACY | Facility: HOSPITAL | Age: 62
End: 2022-09-30

## 2022-09-30 DIAGNOSIS — Z95.2 HX OF AORTIC VALVE REPLACEMENT, MECHANICAL: Primary | ICD-10-CM

## 2022-09-30 LAB
INR PPP: 3.2 (ref 0.91–1.09)
PROTHROMBIN TIME: 39 SECONDS (ref 10–13.8)

## 2022-09-30 PROCEDURE — 85610 PROTHROMBIN TIME: CPT

## 2022-09-30 PROCEDURE — 36416 COLLJ CAPILLARY BLOOD SPEC: CPT

## 2022-09-30 NOTE — PROGRESS NOTES
Anticoagulation Clinic Progress Note    Anticoagulation Summary  As of 9/30/2022    INR goal:  2.5-3.5   TTR:  43.7 % (4 y)   INR used for dosing:  3.2 (9/30/2022)   Warfarin maintenance plan:  7.5 mg every day   Weekly warfarin total:  52.5 mg   Plan last modified:  Merlyn aHssan RPH (9/30/2022)   Next INR check:  10/14/2022   Priority:  Maintenance   Target end date:  Indefinite    Indications    Hx of aortic valve replacement  mechanical [Z95.2] [Z95.2]             Anticoagulation Episode Summary     INR check location:      Preferred lab:      Send INR reminders to:  [facility]    Comments:        Anticoagulation Care Providers     Provider Role Specialty Phone number    Kitty Lagunas MD Referring Cardiology [phone]          Clinic Interview:  Patient Findings     Positives:  Change in medications    Negatives:  Signs/symptoms of thrombosis, Signs/symptoms of bleeding,   Laboratory test error suspected, Change in health, Change in alcohol use,   Change in activity, Upcoming invasive procedure, Emergency department   visit, Upcoming dental procedure, Missed doses, Extra doses, Change in   diet/appetite, Hospital admission, Bruising, Other complaints    Comments:  Patient has been taking 7.5mg QD due to confusion, instead of   5mg Thursdays and 7.5mg AOD.      Clinical Outcomes     Negatives:  Major bleeding event, Thromboembolic event,   Anticoagulation-related hospital admission, Anticoagulation-related ED   visit, Anticoagulation-related fatality    Comments:  Patient has been taking 7.5mg QD due to confusion, instead of   5mg Thursdays and 7.5mg AOD.        INR History:  Anticoagulation Monitoring 8/16/2022 9/15/2022 9/30/2022   INR 3.8 3.9 3.2   INR Date 8/16/2022 9/15/2022 9/30/2022   INR Goal 2.5-3.5 2.5-3.5 2.5-3.5   Trend Same Down Up   Last Week Total 52.5 mg 52.5 mg 52.5 mg   Next Week Total 50 mg 45 mg 52.5 mg   Sun 7.5 mg 7.5 mg 7.5 mg   Mon 7.5 mg 7.5 mg 7.5 mg   Tue 5  mg (8/16); Otherwise 7.5 mg 7.5 mg 7.5 mg   Wed 7.5 mg 7.5 mg 7.5 mg   Thu 7.5 mg Hold (9/15); Otherwise 5 mg 7.5 mg   Fri 7.5 mg 7.5 mg 7.5 mg   Sat 7.5 mg 7.5 mg 7.5 mg   Visit Report - - -   Some recent data might be hidden       Plan:  1. INR is therapeutic today- see above in Anticoagulation Summary.   Will instruct Stew GABINO Cabral to continue their warfarin regimen- see above in Anticoagulation Summary.  2. Follow up in 2 weeks.  3. Patient declines warfarin refills.  4. Verbal and written information provided. Patient expresses understanding and has no further questions at this time.    Marylu Anand, Pharmacy Technician

## 2022-10-04 RX ORDER — WARFARIN SODIUM 5 MG/1
TABLET ORAL
Qty: 140 TABLET | Refills: 1 | Status: SHIPPED | OUTPATIENT
Start: 2022-10-04 | End: 2022-10-28 | Stop reason: SDUPTHER

## 2022-10-14 ENCOUNTER — ANTICOAGULATION VISIT (OUTPATIENT)
Dept: PHARMACY | Facility: HOSPITAL | Age: 62
End: 2022-10-14

## 2022-10-14 DIAGNOSIS — Z95.2 HX OF AORTIC VALVE REPLACEMENT, MECHANICAL: Primary | ICD-10-CM

## 2022-10-14 LAB
INR PPP: 2 (ref 0.91–1.09)
PROTHROMBIN TIME: 24.1 SECONDS (ref 10–13.8)

## 2022-10-14 PROCEDURE — G0463 HOSPITAL OUTPT CLINIC VISIT: HCPCS

## 2022-10-14 PROCEDURE — 36416 COLLJ CAPILLARY BLOOD SPEC: CPT

## 2022-10-14 PROCEDURE — 85610 PROTHROMBIN TIME: CPT

## 2022-10-28 ENCOUNTER — ANTICOAGULATION VISIT (OUTPATIENT)
Dept: PHARMACY | Facility: HOSPITAL | Age: 62
End: 2022-10-28

## 2022-10-28 DIAGNOSIS — Z95.2 HX OF AORTIC VALVE REPLACEMENT, MECHANICAL: Primary | ICD-10-CM

## 2022-10-28 LAB
INR PPP: 2.2 (ref 0.91–1.09)
PROTHROMBIN TIME: 26 SECONDS (ref 10–13.8)

## 2022-10-28 PROCEDURE — G0463 HOSPITAL OUTPT CLINIC VISIT: HCPCS

## 2022-10-28 PROCEDURE — 36416 COLLJ CAPILLARY BLOOD SPEC: CPT

## 2022-10-28 PROCEDURE — 85610 PROTHROMBIN TIME: CPT

## 2022-10-28 RX ORDER — WARFARIN SODIUM 7.5 MG/1
TABLET ORAL
Qty: 90 TABLET | Refills: 1 | Status: SHIPPED | OUTPATIENT
Start: 2022-10-28 | End: 2023-02-14 | Stop reason: SDUPTHER

## 2022-10-28 NOTE — PROGRESS NOTES
Anticoagulation Clinic Progress Note    Anticoagulation Summary  As of 10/28/2022    INR goal:  2.5-3.5   TTR:  43.4 % (4.1 y)   INR used for dosin.2 (10/28/2022)   Warfarin maintenance plan:  7.5 mg every day   Weekly warfarin total:  52.5 mg   Plan last modified:  Ilya Donis, Pharmacy Intern (10/28/2022)   Next INR check:  2022   Priority:  Maintenance   Target end date:  Indefinite    Indications    Hx of aortic valve replacement  mechanical [Z95.2] [Z95.2]             Anticoagulation Episode Summary     INR check location:      Preferred lab:      Send INR reminders to:  [facility]    Comments:        Anticoagulation Care Providers     Provider Role Specialty Phone number    Kitty Lagunas MD Referring Cardiology [phone]          Clinic Interview:  Patient Findings     Positives:  Missed doses, Other complaints    Negatives:  Signs/symptoms of thrombosis, Signs/symptoms of bleeding,   Laboratory test error suspected, Change in health, Change in alcohol use,   Change in activity, Upcoming invasive procedure, Emergency department   visit, Upcoming dental procedure, Extra doses, Change in medications,   Change in diet/appetite, Hospital admission, Bruising    Comments:  New grandchild, Possibly could have missed a dose during this   event and driving back/forth to hospital. Boost 10mg, fermin 2 weeks.   Offered patient a 7.5mg tablet, which he feels would make his regimen much   easier to handle, sending rx to pharmacy.      Clinical Outcomes     Negatives:  Major bleeding event, Thromboembolic event,   Anticoagulation-related hospital admission, Anticoagulation-related ED   visit, Anticoagulation-related fatality    Comments:  New grandchild, Possibly could have missed a dose during this   event and driving back/forth to hospital. Boost 10mg, fermin 2 weeks.   Offered patient a 7.5mg tablet, which he feels would make his regimen much   easier to handle, sending rx to  pharmacy.        INR History:  Anticoagulation Monitoring 9/30/2022 10/14/2022 10/28/2022   INR 3.2 2.0 2.2   INR Date 9/30/2022 10/14/2022 10/28/2022   INR Goal 2.5-3.5 2.5-3.5 2.5-3.5   Trend Up Same Same   Last Week Total 52.5 mg 52.5 mg 52.5 mg   Next Week Total 52.5 mg 55 mg 55 mg   Sun 7.5 mg 7.5 mg 7.5 mg   Mon 7.5 mg 7.5 mg 7.5 mg   Tue 7.5 mg 7.5 mg 7.5 mg   Wed 7.5 mg 7.5 mg 7.5 mg   Thu 7.5 mg 7.5 mg 7.5 mg   Fri 7.5 mg 10 mg (10/14); Otherwise 7.5 mg 10 mg (10/28); Otherwise 7.5 mg   Sat 7.5 mg 7.5 mg 7.5 mg   Visit Report - - -   Some recent data might be hidden       Plan:  1. INR is Subtherapeutic today- see above in Anticoagulation Summary.  Will instruct Stew Cabral to Continue their warfarin regimen- see above in Anticoagulation Summary. Boost dose warfarin 10mg today, cont normally other days. Pt will take 2.5mg when he returns home to complete full 10mg dose  2. Follow up in 2 weeks  3. Patient desires warfarin refills of 7.5mg tablets.  4. Verbal and written information provided. Patient expresses understanding and has no further questions at this time.    Ilya Donis, Pharmacy Intern

## 2022-11-11 ENCOUNTER — APPOINTMENT (OUTPATIENT)
Dept: PHARMACY | Facility: HOSPITAL | Age: 62
End: 2022-11-11

## 2022-11-18 ENCOUNTER — ANTICOAGULATION VISIT (OUTPATIENT)
Dept: PHARMACY | Facility: HOSPITAL | Age: 62
End: 2022-11-18

## 2022-11-18 DIAGNOSIS — Z95.2 HX OF AORTIC VALVE REPLACEMENT, MECHANICAL: Primary | ICD-10-CM

## 2022-11-18 LAB
INR PPP: 2.3 (ref 0.91–1.09)
PROTHROMBIN TIME: 28.2 SECONDS (ref 10–13.8)

## 2022-11-18 PROCEDURE — 36416 COLLJ CAPILLARY BLOOD SPEC: CPT

## 2022-11-18 PROCEDURE — 85610 PROTHROMBIN TIME: CPT

## 2022-11-18 PROCEDURE — G0463 HOSPITAL OUTPT CLINIC VISIT: HCPCS

## 2022-11-18 NOTE — PROGRESS NOTES
Anticoagulation Clinic Progress Note    Anticoagulation Summary  As of 2022    INR goal:  2.5-3.5   TTR:  42.8 % (4.2 y)   INR used for dosin.3 (2022)   Warfarin maintenance plan:  7.5 mg every day; Starting 2022   Weekly warfarin total:  52.5 mg   Plan last modified:  Ilya Donis, Pharmacy Intern (10/28/2022)   Next INR check:  2022   Priority:  Maintenance   Target end date:  Indefinite    Indications    Hx of aortic valve replacement  mechanical [Z95.2] [Z95.2]             Anticoagulation Episode Summary     INR check location:      Preferred lab:      Send INR reminders to:  [facility] [place]    Comments:        Anticoagulation Care Providers     Provider Role Specialty Phone number    Kitty Lagunas MD Referring Cardiology [phone]          Clinic Interview:  Patient Findings     Positives:  Other complaints    Negatives:  Signs/symptoms of thrombosis, Signs/symptoms of bleeding,   Laboratory test error suspected, Change in health, Change in alcohol use,   Change in activity, Upcoming invasive procedure, Emergency department   visit, Upcoming dental procedure, Missed doses, Extra doses, Change in   medications, Change in diet/appetite, Hospital admission, Bruising    Comments:  Reports a series of events that have resulted in high stress   this week, including car breaking down and being towed to an unknown   impound, unknowingly finding that his license  a few weeks ago,   having a tooth broken following an accident at work, etc. As a result, he   feels it is likely he may have missed a dose of warfarin at some point.       Clinical Outcomes     Negatives:  Major bleeding event, Thromboembolic event,   Anticoagulation-related hospital admission, Anticoagulation-related ED   visit, Anticoagulation-related fatality    Comments:  Reports a series of events that have resulted in high stress   this week, including car breaking down and being towed to an  unknown   impound, unknowingly finding that his license  a few weeks ago,   having a tooth broken following an accident at work, etc. As a result, he   feels it is likely he may have missed a dose of warfarin at some point.         INR History:  Anticoagulation Monitoring 10/14/2022 10/28/2022 2022   INR 2.0 2.2 2.3   INR Date 10/14/2022 10/28/2022 2022   INR Goal 2.5-3.5 2.5-3.5 2.5-3.5   Trend Same Same Same   Last Week Total 52.5 mg 52.5 mg 52.5 mg   Next Week Total 55 mg 55 mg 55 mg   Sun 7.5 mg 7.5 mg 7.5 mg   Mon 7.5 mg 7.5 mg 7.5 mg   Tue 7.5 mg 7.5 mg 7.5 mg   Wed 7.5 mg 7.5 mg 7.5 mg   Thu 7.5 mg 7.5 mg 7.5 mg   Fri 10 mg (10/14); Otherwise 7.5 mg 10 mg (10/28); Otherwise 7.5 mg 7.5 mg   Sat 7.5 mg 7.5 mg 10 mg (); Otherwise 7.5 mg   Visit Report - - -   Some recent data might be hidden       Plan:  1. INR is Subtherapeutic today- see above in Anticoagulation Summary.  Will instruct Stew Cabral to Change their warfarin regimen- see above in Anticoagulation Summary.  2. Follow up in 2 weeks  3. Patient declines warfarin refills.  4. Verbal and written information provided. Patient expresses understanding and has no further questions at this time.    Gael Turner, PharmD

## 2022-12-14 ENCOUNTER — ANTICOAGULATION VISIT (OUTPATIENT)
Dept: PHARMACY | Facility: HOSPITAL | Age: 62
End: 2022-12-14

## 2022-12-14 DIAGNOSIS — Z95.2 HX OF AORTIC VALVE REPLACEMENT, MECHANICAL: Primary | ICD-10-CM

## 2022-12-14 LAB
INR PPP: 3.3 (ref 0.91–1.09)
PROTHROMBIN TIME: 39.5 SECONDS (ref 10–13.8)

## 2022-12-14 PROCEDURE — 85610 PROTHROMBIN TIME: CPT

## 2022-12-14 PROCEDURE — 36416 COLLJ CAPILLARY BLOOD SPEC: CPT

## 2022-12-14 NOTE — PROGRESS NOTES
Anticoagulation Clinic Progress Note    Anticoagulation Summary  As of 12/14/2022    INR goal:  2.5-3.5   TTR:  43.5 % (4.2 y)   INR used for dosing:  3.3 (12/14/2022)   Warfarin maintenance plan:  7.5 mg every day; Starting 12/14/2022   Weekly warfarin total:  52.5 mg   No change documented:  Sue Woodruff   Plan last modified:  Ilya Donis, Pharmacy Intern (10/28/2022)   Next INR check:  12/28/2022   Priority:  Maintenance   Target end date:  Indefinite    Indications    Hx of aortic valve replacement  mechanical [Z95.2] [Z95.2]             Anticoagulation Episode Summary     INR check location:      Preferred lab:      Send INR reminders to:   [facility]    Comments:        Anticoagulation Care Providers     Provider Role Specialty Phone number    Kitty Lagunas MD Referring Cardiology [phone]          Clinic Interview:      INR History:  Anticoagulation Monitoring 10/28/2022 11/18/2022 12/14/2022   INR 2.2 2.3 3.3   INR Date 10/28/2022 11/18/2022 12/14/2022   INR Goal 2.5-3.5 2.5-3.5 2.5-3.5   Trend Same Same Same   Last Week Total 52.5 mg 52.5 mg 52.5 mg   Next Week Total 55 mg 55 mg 52.5 mg   Sun 7.5 mg 7.5 mg 7.5 mg   Mon 7.5 mg 7.5 mg 7.5 mg   Tue 7.5 mg 7.5 mg 7.5 mg   Wed 7.5 mg 7.5 mg 7.5 mg   Thu 7.5 mg 7.5 mg 7.5 mg   Fri 10 mg (10/28); Otherwise 7.5 mg 7.5 mg 7.5 mg   Sat 7.5 mg 10 mg (11/19); Otherwise 7.5 mg 7.5 mg   Visit Report - - -   Some recent data might be hidden       Plan:  1. INR is therapeutic today- see above in Anticoagulation Summary.   Will instruct Stew Cabral to continue their warfarin regimen- see above in Anticoagulation Summary.  2. Follow up in 2 weeks.  3. Patient declines warfarin refills.  4. Verbal and written information provided. Patient expresses understanding and has no further questions at this time.    Sue Woodruff

## 2023-01-10 ENCOUNTER — ANTICOAGULATION VISIT (OUTPATIENT)
Dept: PHARMACY | Facility: HOSPITAL | Age: 63
End: 2023-01-10
Payer: COMMERCIAL

## 2023-01-10 DIAGNOSIS — Z95.2 HX OF AORTIC VALVE REPLACEMENT, MECHANICAL: Primary | ICD-10-CM

## 2023-01-10 LAB
INR PPP: 2.1 (ref 0.91–1.09)
PROTHROMBIN TIME: 25.2 SECONDS (ref 10–13.8)

## 2023-01-10 PROCEDURE — G0463 HOSPITAL OUTPT CLINIC VISIT: HCPCS

## 2023-01-10 PROCEDURE — 36416 COLLJ CAPILLARY BLOOD SPEC: CPT

## 2023-01-10 PROCEDURE — 85610 PROTHROMBIN TIME: CPT

## 2023-01-10 NOTE — PROGRESS NOTES
Anticoagulation Clinic Progress Note    Anticoagulation Summary  As of 1/10/2023    INR goal:  2.5-3.5   TTR:  43.9 % (4.3 y)   INR used for dosin.1 (1/10/2023)   Warfarin maintenance plan:  7.5 mg every day; Starting 1/10/2023   Weekly warfarin total:  52.5 mg   Plan last modified:  Ilya Donis, Pharmacy Intern (10/28/2022)   Next INR check:  2023   Priority:  Maintenance   Target end date:  Indefinite    Indications    Hx of aortic valve replacement  mechanical [Z95.2] [Z95.2]             Anticoagulation Episode Summary     INR check location:      Preferred lab:      Send INR reminders to:  [facility]    Comments:        Anticoagulation Care Providers     Provider Role Specialty Phone number    Kitty Lagunas MD Referring Cardiology [phone]          Clinic Interview:  Patient Findings     Positives:  Missed doses    Negatives:  Signs/symptoms of thrombosis, Signs/symptoms of bleeding,   Laboratory test error suspected, Change in health, Change in alcohol use,   Change in activity, Upcoming invasive procedure, Emergency department   visit, Upcoming dental procedure, Extra doses, Change in medications,   Change in diet/appetite, Hospital admission, Bruising, Other complaints      Clinical Outcomes     Negatives:  Major bleeding event, Thromboembolic event,   Anticoagulation-related hospital admission, Anticoagulation-related ED   visit, Anticoagulation-related fatality        INR History:  Anticoagulation Monitoring 2022 2022 1/10/2023   INR 2.3 3.3 2.1   INR Date 2022 2022 1/10/2023   INR Goal 2.5-3.5 2.5-3.5 2.5-3.5   Trend Same Same Same   Last Week Total 52.5 mg 52.5 mg 52.5 mg   Next Week Total 55 mg 52.5 mg 55 mg   Sun 7.5 mg 7.5 mg 7.5 mg   Mon 7.5 mg 7.5 mg 7.5 mg   Tue 7.5 mg 7.5 mg 7.5 mg   Wed 7.5 mg 7.5 mg 10 mg (); Otherwise 7.5 mg   Thu 7.5 mg 7.5 mg 7.5 mg   Fri 7.5 mg 7.5 mg 7.5 mg   Sat 10 mg (); Otherwise 7.5 mg 7.5 mg 7.5  mg   Visit Report - - -   Some recent data might be hidden       Plan:  1. INR is Subtherapeutic today- see above in Anticoagulation Summary.  Will instruct Stew LLOYD Misha to Increase their warfarin regimen- see above in Anticoagulation Summary.  2. Follow up in 2 weeks  3. Patient declines warfarin refills.  4. Verbal and written information provided. Patient expresses understanding and has no further questions at this time.    Merlyn Hassan [facility]

## 2023-01-27 ENCOUNTER — ANTICOAGULATION VISIT (OUTPATIENT)
Dept: PHARMACY | Facility: HOSPITAL | Age: 63
End: 2023-01-27
Payer: COMMERCIAL

## 2023-01-27 DIAGNOSIS — Z95.2 HX OF AORTIC VALVE REPLACEMENT, MECHANICAL: Primary | ICD-10-CM

## 2023-01-27 LAB
INR PPP: 4.1 (ref 0.91–1.09)
PROTHROMBIN TIME: 48.7 SECONDS (ref 10–13.8)

## 2023-01-27 PROCEDURE — 85610 PROTHROMBIN TIME: CPT

## 2023-01-27 PROCEDURE — 36416 COLLJ CAPILLARY BLOOD SPEC: CPT

## 2023-01-27 PROCEDURE — G0463 HOSPITAL OUTPT CLINIC VISIT: HCPCS

## 2023-01-27 NOTE — PROGRESS NOTES
Anticoagulation Clinic Progress Note    Anticoagulation Summary  As of 2023    INR goal:  2.5-3.5   TTR:  43.9 % (4.3 y)   INR used for dosin.1 (2023)   Warfarin maintenance plan:  7.5 mg every day; Starting 2023   Weekly warfarin total:  52.5 mg   Plan last modified:  Ilya Donis, Pharmacy Intern (10/28/2022)   Next INR check:  2023   Priority:  Maintenance   Target end date:  Indefinite    Indications    Hx of aortic valve replacement  mechanical [Z95.2] [Z95.2]             Anticoagulation Episode Summary     INR check location:      Preferred lab:      Send INR reminders to:  [facility]    Comments:        Anticoagulation Care Providers     Provider Role Specialty Phone number    Kitty Lagunas MD Referring Cardiology [phone]          Clinic Interview:  Patient Findings     Positives:  Other complaints    Negatives:  Signs/symptoms of thrombosis, Signs/symptoms of bleeding,   Laboratory test error suspected, Change in health, Change in alcohol use,   Change in activity, Upcoming invasive procedure, Emergency department   visit, Upcoming dental procedure, Missed doses, Extra doses, Change in   medications, Change in diet/appetite, Hospital admission, Bruising    Comments:  Reports he aims to take his warfarin at 6 am; however,   yesterday he indicates he was not able to take it until 4:30 pm. He   indicates this happens infrequently; however, he recently stopped using a   pill box for his warfarin and he admits that it is possible there may have   been a day when he took an extra dose of warfarin. He has already taken   today's dose of warfarin.      Clinical Outcomes     Negatives:  Major bleeding event, Thromboembolic event,   Anticoagulation-related hospital admission, Anticoagulation-related ED   visit, Anticoagulation-related fatality    Comments:  Reports he aims to take his warfarin at 6 am; however,   yesterday he indicates he was not able to take it  until 4:30 pm. He   indicates this happens infrequently; however, he recently stopped using a   pill box for his warfarin and he admits that it is possible there may have   been a day when he took an extra dose of warfarin. He has already taken   today's dose of warfarin.        INR History:  Anticoagulation Monitoring 12/14/2022 1/10/2023 1/27/2023   INR 3.3 2.1 4.1   INR Date 12/14/2022 1/10/2023 1/27/2023   INR Goal 2.5-3.5 2.5-3.5 2.5-3.5   Trend Same Same Same   Last Week Total 52.5 mg 52.5 mg 52.5 mg   Next Week Total 52.5 mg 55 mg 50 mg   Sun 7.5 mg 7.5 mg 7.5 mg   Mon 7.5 mg 7.5 mg 7.5 mg   Tue 7.5 mg 7.5 mg 7.5 mg   Wed 7.5 mg 10 mg (1/11); Otherwise 7.5 mg 7.5 mg   Thu 7.5 mg 7.5 mg 7.5 mg   Fri 7.5 mg 7.5 mg 7.5 mg   Sat 7.5 mg 7.5 mg 5 mg (1/28); Otherwise 7.5 mg   Visit Report - - -   Some recent data might be hidden       Plan:  1. INR is Supratherapeutic today- see above in Anticoagulation Summary.  Will instruct Stew Cabral to Change their warfarin regimen (5 mg tomorrow, then resume 7.5 mg daily) and begin using his pill box again - see above in Anticoagulation Summary.  2. Follow up in 2 weeks  3. Patient declines warfarin refills.  4. Verbal and written information provided. Patient expresses understanding and has no further questions at this time.    Gael Turner, PharmD

## 2023-02-14 ENCOUNTER — ANTICOAGULATION VISIT (OUTPATIENT)
Dept: PHARMACY | Facility: HOSPITAL | Age: 63
End: 2023-02-14
Payer: COMMERCIAL

## 2023-02-14 DIAGNOSIS — Z95.2 HX OF AORTIC VALVE REPLACEMENT, MECHANICAL: Primary | ICD-10-CM

## 2023-02-14 LAB
INR PPP: 2.4 (ref 0.91–1.09)
PROTHROMBIN TIME: 28.5 SECONDS (ref 10–13.8)

## 2023-02-14 PROCEDURE — 85610 PROTHROMBIN TIME: CPT

## 2023-02-14 PROCEDURE — 36416 COLLJ CAPILLARY BLOOD SPEC: CPT

## 2023-02-14 RX ORDER — WARFARIN SODIUM 7.5 MG/1
TABLET ORAL
Qty: 90 TABLET | Refills: 1 | Status: SHIPPED | OUTPATIENT
Start: 2023-02-14

## 2023-02-14 NOTE — PROGRESS NOTES
Anticoagulation Clinic Progress Note    Anticoagulation Summary  As of 2023    INR goal:  2.5-3.5   TTR:  44.1 % (4.4 y)   INR used for dosin.4 (2023)   Warfarin maintenance plan:  7.5 mg every day; Starting 2023   Weekly warfarin total:  52.5 mg   No change documented:  Huong Paniagua, Pharmacy Technician   Plan last modified:  Ilya Donis, Pharmacy Intern (10/28/2022)   Next INR check:  2023   Priority:  Maintenance   Target end date:  Indefinite    Indications    Hx of aortic valve replacement  mechanical [Z95.2] [Z95.2]             Anticoagulation Episode Summary     INR check location:      Preferred lab:      Send INR reminders to:  [facility]    Comments:        Anticoagulation Care Providers     Provider Role Specialty Phone number    Kitty Lagunas MD Referring Cardiology [phone]          Clinic Interview:  Patient Findings     Negatives:  Signs/symptoms of thrombosis, Signs/symptoms of bleeding,   Laboratory test error suspected, Change in health, Change in alcohol use,   Change in activity, Upcoming invasive procedure, Emergency department   visit, Upcoming dental procedure, Missed doses, Extra doses, Change in   medications, Change in diet/appetite, Hospital admission, Bruising, Other   complaints      Clinical Outcomes     Negatives:  Major bleeding event, Thromboembolic event,   Anticoagulation-related hospital admission, Anticoagulation-related ED   visit, Anticoagulation-related fatality        INR History:  Anticoagulation Monitoring 1/10/2023 2023 2023   INR 2.1 4.1 2.4   INR Date 1/10/2023 2023 2023   INR Goal 2.5-3.5 2.5-3.5 2.5-3.5   Trend Same Same Same   Last Week Total 52.5 mg 52.5 mg 52.5 mg   Next Week Total 55 mg 50 mg 52.5 mg   Sun 7.5 mg 7.5 mg 7.5 mg   Mon 7.5 mg 7.5 mg 7.5 mg   Tue 7.5 mg 7.5 mg 7.5 mg   Wed 10 mg (); Otherwise 7.5 mg 7.5 mg 7.5 mg   Thu 7.5 mg 7.5 mg 7.5 mg   Fri 7.5 mg 7.5 mg 7.5 mg    Sat 7.5 mg 5 mg (1/28); Otherwise 7.5 mg 7.5 mg   Visit Report - - -   Some recent data might be hidden       Plan:  1. INR is out of range- see above in Anticoagulation Summary.   Will instruct Stew GABINO Cabral to Continue  their warfarin regimen- see above in Anticoagulation Summary.  2. Follow up in 2 weeks.   3. Patient desires warfarin refills.  4. Verbal and written information provided. Patient expresses understanding and has no further questions at this time.    Huong Paniagua, Pharmacy Technician

## 2023-03-07 ENCOUNTER — TELEPHONE (OUTPATIENT)
Dept: PHARMACY | Facility: HOSPITAL | Age: 63
End: 2023-03-07
Payer: COMMERCIAL

## 2023-03-07 NOTE — TELEPHONE ENCOUNTER
LVM regarding missed appointment with medication management clinic. Asked him to call us back to get rescheduled.

## 2023-03-13 ENCOUNTER — TELEPHONE (OUTPATIENT)
Dept: PHARMACY | Facility: HOSPITAL | Age: 63
End: 2023-03-13
Payer: COMMERCIAL

## 2023-03-13 NOTE — TELEPHONE ENCOUNTER
LVM regarding missed INR appointment with med management clinic. Requested that he call us back to reschedule.

## 2023-03-14 ENCOUNTER — ANTICOAGULATION VISIT (OUTPATIENT)
Dept: PHARMACY | Facility: HOSPITAL | Age: 63
End: 2023-03-14
Payer: COMMERCIAL

## 2023-03-14 DIAGNOSIS — Z95.2 HX OF AORTIC VALVE REPLACEMENT, MECHANICAL: Primary | ICD-10-CM

## 2023-03-14 LAB
INR PPP: 3.8 (ref 0.91–1.09)
PROTHROMBIN TIME: 45.2 SECONDS (ref 10–13.8)

## 2023-03-14 PROCEDURE — G0463 HOSPITAL OUTPT CLINIC VISIT: HCPCS

## 2023-03-14 PROCEDURE — 36416 COLLJ CAPILLARY BLOOD SPEC: CPT

## 2023-03-14 PROCEDURE — 85610 PROTHROMBIN TIME: CPT

## 2023-03-14 NOTE — PROGRESS NOTES
Anticoagulation Clinic Progress Note    Anticoagulation Summary  As of 3/14/2023    INR goal:  2.5-3.5   TTR:  44.5 % (4.5 y)   INR used for dosing:  3.8 (3/14/2023)   Warfarin maintenance plan:  7.5 mg every day; Starting 3/14/2023   Weekly warfarin total:  52.5 mg   Plan last modified:  Ilya Donis, Pharmacy Intern (10/28/2022)   Next INR check:  3/29/2023   Priority:  Maintenance   Target end date:  Indefinite    Indications    Hx of aortic valve replacement  mechanical [Z95.2] [Z95.2]             Anticoagulation Episode Summary     INR check location:      Preferred lab:      Send INR reminders to:  [facility]    Comments:        Anticoagulation Care Providers     Provider Role Specialty Phone number    Kitty Lagunas MD Referring Cardiology [phone]          Clinic Interview:  Patient Findings     Negatives:  Signs/symptoms of thrombosis, Signs/symptoms of bleeding,   Laboratory test error suspected, Change in health, Change in alcohol use,   Change in activity, Upcoming invasive procedure, Emergency department   visit, Upcoming dental procedure, Missed doses, Extra doses, Change in   medications, Change in diet/appetite, Hospital admission, Bruising, Other   complaints      Clinical Outcomes     Negatives:  Major bleeding event, Thromboembolic event,   Anticoagulation-related hospital admission, Anticoagulation-related ED   visit, Anticoagulation-related fatality        INR History:  Anticoagulation Monitoring 1/27/2023 2/14/2023 3/14/2023   INR 4.1 2.4 3.8   INR Date 1/27/2023 2/14/2023 3/14/2023   INR Goal 2.5-3.5 2.5-3.5 2.5-3.5   Trend Same Same Same   Last Week Total 52.5 mg 52.5 mg 52.5 mg   Next Week Total 50 mg 52.5 mg 50 mg   Sun 7.5 mg 7.5 mg 7.5 mg   Mon 7.5 mg 7.5 mg 7.5 mg   Tue 7.5 mg 7.5 mg 5 mg (3/14); Otherwise 7.5 mg   Wed 7.5 mg 7.5 mg 7.5 mg   Thu 7.5 mg 7.5 mg 7.5 mg   Fri 7.5 mg 7.5 mg 7.5 mg   Sat 5 mg (1/28); Otherwise 7.5 mg 7.5 mg 7.5 mg   Visit Report -  - -   Some recent data might be hidden       Plan:  1. INR is Supratherapeutic today- see above in Anticoagulation Summary.  Will instruct Stew Cabral to Change their warfarin regimen (5 mg today, then resume 7.5 mg daily) - see above in Anticoagulation Summary.  2. Follow up in 2 weeks  3. Patient declines warfarin refills.  4. Verbal and written information provided. Patient expresses understanding and has no further questions at this time.    Gael Turner, PharmD

## 2023-03-29 ENCOUNTER — ANTICOAGULATION VISIT (OUTPATIENT)
Dept: PHARMACY | Facility: HOSPITAL | Age: 63
End: 2023-03-29
Payer: COMMERCIAL

## 2023-03-29 DIAGNOSIS — Z95.2 HX OF AORTIC VALVE REPLACEMENT, MECHANICAL: Primary | ICD-10-CM

## 2023-03-29 LAB
INR PPP: 3 (ref 0.91–1.09)
PROTHROMBIN TIME: 36.4 SECONDS (ref 10–13.8)

## 2023-03-29 PROCEDURE — 36416 COLLJ CAPILLARY BLOOD SPEC: CPT

## 2023-03-29 PROCEDURE — 85610 PROTHROMBIN TIME: CPT

## 2023-03-29 NOTE — PROGRESS NOTES
Anticoagulation Clinic Progress Note    Anticoagulation Summary  As of 3/29/2023    INR goal:  2.5-3.5   TTR:  44.7 % (4.5 y)   INR used for dosing:  3.0 (3/29/2023)   Warfarin maintenance plan:  7.5 mg every day; Starting 3/29/2023   Weekly warfarin total:  52.5 mg   No change documented:  Sue Woodruff   Plan last modified:  Ilya Donis, Pharmacy Intern (10/28/2022)   Next INR check:  4/12/2023   Priority:  Maintenance   Target end date:  Indefinite    Indications    Hx of aortic valve replacement  mechanical [Z95.2] [Z95.2]             Anticoagulation Episode Summary     INR check location:      Preferred lab:      Send INR reminders to:  [facility]    Comments:        Anticoagulation Care Providers     Provider Role Specialty Phone number    Kitty Lagunas MD Referring Cardiology [phone]          Clinic Interview:  Patient Findings     Negatives:  Signs/symptoms of thrombosis, Signs/symptoms of bleeding,   Laboratory test error suspected, Change in health, Change in alcohol use,   Change in activity, Upcoming invasive procedure, Emergency department   visit, Upcoming dental procedure, Missed doses, Extra doses, Change in   medications, Change in diet/appetite, Hospital admission, Bruising, Other   complaints      Clinical Outcomes     Negatives:  Major bleeding event, Thromboembolic event,   Anticoagulation-related hospital admission, Anticoagulation-related ED   visit, Anticoagulation-related fatality        INR History:  Anticoagulation Monitoring 2/14/2023 3/14/2023 3/29/2023   INR 2.4 3.8 3.0   INR Date 2/14/2023 3/14/2023 3/29/2023   INR Goal 2.5-3.5 2.5-3.5 2.5-3.5   Trend Same Same Same   Last Week Total 52.5 mg 52.5 mg 52.5 mg   Next Week Total 52.5 mg 50 mg 52.5 mg   Sun 7.5 mg 7.5 mg 7.5 mg   Mon 7.5 mg 7.5 mg 7.5 mg   Tue 7.5 mg 5 mg (3/14); Otherwise 7.5 mg 7.5 mg   Wed 7.5 mg 7.5 mg 7.5 mg   Thu 7.5 mg 7.5 mg 7.5 mg   Fri 7.5 mg 7.5 mg 7.5 mg   Sat 7.5 mg  7.5 mg 7.5 mg   Visit Report - - -   Some recent data might be hidden       Plan:  1. INR is therapeutic today- see above in Anticoagulation Summary.   Will instruct Stew GABINO Cabral to continue their warfarin regimen- see above in Anticoagulation Summary.  2. Follow up in 2 weeks.  3. Patient declines warfarin refills.  4. Verbal and written information provided. Patient expresses understanding and has no further questions at this time.    Sue Woodruff

## 2023-04-12 ENCOUNTER — ANTICOAGULATION VISIT (OUTPATIENT)
Dept: PHARMACY | Facility: HOSPITAL | Age: 63
End: 2023-04-12
Payer: COMMERCIAL

## 2023-04-12 DIAGNOSIS — Z95.2 HX OF AORTIC VALVE REPLACEMENT, MECHANICAL: Primary | ICD-10-CM

## 2023-04-12 LAB
INR PPP: 3.6 (ref 0.91–1.09)
PROTHROMBIN TIME: 43.7 SECONDS (ref 10–13.8)

## 2023-04-12 PROCEDURE — 85610 PROTHROMBIN TIME: CPT

## 2023-04-12 PROCEDURE — 36416 COLLJ CAPILLARY BLOOD SPEC: CPT

## 2023-04-12 NOTE — PROGRESS NOTES
I have supervised and reviewed the notes, assessments, and/or procedures performed by our Pharmacy Intern. The documented assessment and plan were developed cooperatively, and the plan was implemented in my presence. I concur with the documentation of this patient encounter.    Merlyn Hassan [facility]

## 2023-04-12 NOTE — PROGRESS NOTES
Anticoagulation Clinic Progress Note    Anticoagulation Summary  As of 4/12/2023    INR goal:  2.5-3.5   TTR:  45.0 % (4.6 y)   INR used for dosing:  3.6 (4/12/2023)   Warfarin maintenance plan:  7.5 mg every day; Starting 4/12/2023   Weekly warfarin total:  52.5 mg   No change documented:  Kashif Paul, Pharmacy Intern   Plan last modified:  Ilya Donis, Pharmacy Intern (10/28/2022)   Next INR check:  5/2/2023   Priority:  Maintenance   Target end date:  Indefinite    Indications    Hx of aortic valve replacement  mechanical [Z95.2] [Z95.2]             Anticoagulation Episode Summary     INR check location:      Preferred lab:      Send INR reminders to:  [facility]    Comments:        Anticoagulation Care Providers     Provider Role Specialty Phone number    Kitty Lagunas MD Referring Cardiology [phone]          Clinic Interview:  Patient Findings     Positives:  Missed doses    Negatives:  Signs/symptoms of thrombosis, Signs/symptoms of bleeding,   Laboratory test error suspected, Change in health, Change in alcohol use,   Change in activity, Upcoming invasive procedure, Emergency department   visit, Upcoming dental procedure, Extra doses, Change in medications,   Change in diet/appetite, Hospital admission, Bruising, Other complaints    Comments:  Patient missed a dose so he took an additional half tablet the   next day.       Clinical Outcomes     Negatives:  Major bleeding event, Thromboembolic event,   Anticoagulation-related hospital admission, Anticoagulation-related ED   visit, Anticoagulation-related fatality    Comments:  Patient missed a dose so he took an additional half tablet the   next day.         INR History:      12/14/2022     8:30 AM 1/10/2023     8:15 AM 1/27/2023     8:00 AM 2/14/2023     8:00 AM 3/14/2023     8:45 AM 3/29/2023     8:00 AM 4/12/2023     8:00 AM   Anticoagulation Monitoring   INR 3.3 2.1 4.1 2.4 3.8 3.0 3.6   INR Date 12/14/2022 1/10/2023  1/27/2023 2/14/2023 3/14/2023 3/29/2023 4/12/2023   INR Goal 2.5-3.5 2.5-3.5 2.5-3.5 2.5-3.5 2.5-3.5 2.5-3.5 2.5-3.5   Trend Same Same Same Same Same Same Same   Last Week Total 52.5 mg 52.5 mg 52.5 mg 52.5 mg 52.5 mg 52.5 mg 47.5 mg   Next Week Total 52.5 mg 55 mg 50 mg 52.5 mg 50 mg 52.5 mg 52.5 mg   Sun 7.5 mg 7.5 mg 7.5 mg 7.5 mg 7.5 mg 7.5 mg 7.5 mg   Mon 7.5 mg 7.5 mg 7.5 mg 7.5 mg 7.5 mg 7.5 mg 7.5 mg   Tue 7.5 mg 7.5 mg 7.5 mg 7.5 mg 5 mg (3/14); Otherwise 7.5 mg 7.5 mg 7.5 mg   Wed 7.5 mg 10 mg (1/11); Otherwise 7.5 mg 7.5 mg 7.5 mg 7.5 mg 7.5 mg 7.5 mg   Thu 7.5 mg 7.5 mg 7.5 mg 7.5 mg 7.5 mg 7.5 mg 7.5 mg   Fri 7.5 mg 7.5 mg 7.5 mg 7.5 mg 7.5 mg 7.5 mg 7.5 mg   Sat 7.5 mg 7.5 mg 5 mg (1/28); Otherwise 7.5 mg 7.5 mg 7.5 mg 7.5 mg 7.5 mg       Plan:  1. INR is Supratherapeutic today- see above in Anticoagulation Summary.  Will instruct Stew Cabral to Continue their warfarin regimen- see above in Anticoagulation Summary.  2. Follow up in 2 weeks  3. Patient declines warfarin refills.  4. Verbal and written information provided. Patient expresses understanding and has no further questions at this time.    Kashif Paul, Pharmacy Intern

## 2023-05-02 ENCOUNTER — ANTICOAGULATION VISIT (OUTPATIENT)
Dept: PHARMACY | Facility: HOSPITAL | Age: 63
End: 2023-05-02
Payer: COMMERCIAL

## 2023-05-02 DIAGNOSIS — Z95.2 HX OF AORTIC VALVE REPLACEMENT, MECHANICAL: Primary | ICD-10-CM

## 2023-05-02 LAB
INR PPP: 3.8 (ref 0.91–1.09)
PROTHROMBIN TIME: 45.8 SECONDS (ref 10–13.8)

## 2023-05-02 PROCEDURE — G0463 HOSPITAL OUTPT CLINIC VISIT: HCPCS

## 2023-05-02 PROCEDURE — 85610 PROTHROMBIN TIME: CPT

## 2023-05-02 PROCEDURE — 36416 COLLJ CAPILLARY BLOOD SPEC: CPT

## 2023-05-02 NOTE — PROGRESS NOTES
Anticoagulation Clinic Progress Note    Anticoagulation Summary  As of 5/2/2023    INR goal:  2.5-3.5   TTR:  44.5 % (4.6 y)   INR used for dosing:  3.8 (5/2/2023)   Warfarin maintenance plan:  3.75 mg every Sun; 7.5 mg all other days; Starting 5/2/2023   Weekly warfarin total:  48.75 mg   Plan last modified:  Gael Turner, PharmD (5/2/2023)   Next INR check:  5/16/2023   Priority:  Maintenance   Target end date:  Indefinite    Indications    Hx of aortic valve replacement  mechanical [Z95.2] [Z95.2]             Anticoagulation Episode Summary     INR check location:      Preferred lab:      Send INR reminders to:  [facility]    Comments:        Anticoagulation Care Providers     Provider Role Specialty Phone number    Kitty Lagunas MD Referring Cardiology [phone]          Clinic Interview:  Patient Findings     Negatives:  Signs/symptoms of thrombosis, Signs/symptoms of bleeding,   Laboratory test error suspected, Change in health, Change in alcohol use,   Change in activity, Upcoming invasive procedure, Emergency department   visit, Upcoming dental procedure, Missed doses, Extra doses, Change in   medications, Change in diet/appetite, Hospital admission, Bruising, Other   complaints      Clinical Outcomes     Negatives:  Major bleeding event, Thromboembolic event,   Anticoagulation-related hospital admission, Anticoagulation-related ED   visit, Anticoagulation-related fatality        INR History:      1/10/2023     8:15 AM 1/27/2023     8:00 AM 2/14/2023     8:00 AM 3/14/2023     8:45 AM 3/29/2023     8:00 AM 4/12/2023     8:00 AM 5/2/2023     8:15 AM   Anticoagulation Monitoring   INR 2.1 4.1 2.4 3.8 3.0 3.6 3.8   INR Date 1/10/2023 1/27/2023 2/14/2023 3/14/2023 3/29/2023 4/12/2023 5/2/2023   INR Goal 2.5-3.5 2.5-3.5 2.5-3.5 2.5-3.5 2.5-3.5 2.5-3.5 2.5-3.5   Trend Same Same Same Same Same Same Down   Last Week Total 52.5 mg 52.5 mg 52.5 mg 52.5 mg 52.5 mg 47.5 mg 52.5 mg   Next Week  Total 55 mg 50 mg 52.5 mg 50 mg 52.5 mg 52.5 mg 48.75 mg   Sun 7.5 mg 7.5 mg 7.5 mg 7.5 mg 7.5 mg 7.5 mg 3.75 mg   Mon 7.5 mg 7.5 mg 7.5 mg 7.5 mg 7.5 mg 7.5 mg 7.5 mg   Tue 7.5 mg 7.5 mg 7.5 mg 5 mg (3/14); Otherwise 7.5 mg 7.5 mg 7.5 mg 7.5 mg   Wed 10 mg (1/11); Otherwise 7.5 mg 7.5 mg 7.5 mg 7.5 mg 7.5 mg 7.5 mg 7.5 mg   Thu 7.5 mg 7.5 mg 7.5 mg 7.5 mg 7.5 mg 7.5 mg 7.5 mg   Fri 7.5 mg 7.5 mg 7.5 mg 7.5 mg 7.5 mg 7.5 mg 7.5 mg   Sat 7.5 mg 5 mg (1/28); Otherwise 7.5 mg 7.5 mg 7.5 mg 7.5 mg 7.5 mg 7.5 mg       Plan:  1. INR is Supratherapeutic today- see above in Anticoagulation Summary.  Will instruct Stew GABINO Cabral to Change their warfarin regimen- see above in Anticoagulation Summary.  2. Follow up in 2 weeks  3. Patient declines warfarin refills.  4. Verbal and written information provided. Patient expresses understanding and has no further questions at this time.    Gael Turner, PharmD

## 2023-05-15 RX ORDER — WARFARIN SODIUM 5 MG/1
TABLET ORAL
Qty: 140 TABLET | Refills: 1 | OUTPATIENT
Start: 2023-05-15

## 2023-06-09 ENCOUNTER — TELEPHONE (OUTPATIENT)
Dept: PHARMACY | Facility: HOSPITAL | Age: 63
End: 2023-06-09
Payer: COMMERCIAL

## 2023-06-09 NOTE — TELEPHONE ENCOUNTER
Contacted patient due to being overdue for an INR recheck. Patient scheduled appointment for 6/15.

## 2023-06-15 ENCOUNTER — ANTICOAGULATION VISIT (OUTPATIENT)
Dept: PHARMACY | Facility: HOSPITAL | Age: 63
End: 2023-06-15
Payer: COMMERCIAL

## 2023-06-15 DIAGNOSIS — Z95.2 HX OF AORTIC VALVE REPLACEMENT, MECHANICAL: Primary | ICD-10-CM

## 2023-06-15 LAB
INR PPP: 5.2 (ref 0.91–1.09)
PROTHROMBIN TIME: 63 SECONDS (ref 10–13.8)

## 2023-06-15 PROCEDURE — G0463 HOSPITAL OUTPT CLINIC VISIT: HCPCS

## 2023-06-15 PROCEDURE — 85610 PROTHROMBIN TIME: CPT

## 2023-06-15 PROCEDURE — 36416 COLLJ CAPILLARY BLOOD SPEC: CPT

## 2023-06-15 NOTE — PROGRESS NOTES
Anticoagulation Clinic Progress Note    Anticoagulation Summary  As of 6/15/2023    INR goal:  2.5-3.5   TTR:  43.3 % (4.7 y)   INR used for dosin.2 (6/15/2023)   Warfarin maintenance plan:  3.75 mg every Sun; 7.5 mg all other days; Starting 6/15/2023   Weekly warfarin total:  48.75 mg   Plan last modified:  Gael Turner, PharmD (2023)   Next INR check:  2023   Priority:  Maintenance   Target end date:  Indefinite    Indications    Hx of aortic valve replacement  mechanical [Z95.2] [Z95.2]             Anticoagulation Episode Summary     INR check location:      Preferred lab:      Send INR reminders to:  [facility] [place]    Comments:        Anticoagulation Care Providers     Provider Role Specialty Phone number    Kitty Lagunas MD Referring Cardiology [phone]          Clinic Interview:  Patient Findings     Positives:  Change in diet/appetite, Other complaints    Negatives:  Signs/symptoms of thrombosis, Signs/symptoms of bleeding,   Laboratory test error suspected, Change in health, Change in alcohol use,   Change in activity, Upcoming invasive procedure, Emergency department   visit, Upcoming dental procedure, Missed doses, Extra doses, Change in   medications, Hospital admission, Bruising    Comments:  Reports it is possible he missed his warfarin on 23, but   he is uncertain. He believes he accidentally reverted back to 7.5 mg daily   2-3 weeks ago. Reports he bought a large container of trail mix this week,   which he has been eating the past couple days. He indicates it contains a   small dried fruit, but he is not sure if it is dried cranberry. He will   check and avoid eating if so.       Clinical Outcomes     Negatives:  Major bleeding event, Thromboembolic event,   Anticoagulation-related hospital admission, Anticoagulation-related ED   visit, Anticoagulation-related fatality    Comments:  Reports it is possible he missed his warfarin on 23, but   he is uncertain.  He believes he accidentally reverted back to 7.5 mg daily   2-3 weeks ago. Reports he bought a large container of trail mix this week,   which he has been eating the past couple days. He indicates it contains a   small dried fruit, but he is not sure if it is dried cranberry. He will   check and avoid eating if so.         INR History:      1/27/2023     8:00 AM 2/14/2023     8:00 AM 3/14/2023     8:45 AM 3/29/2023     8:00 AM 4/12/2023     8:00 AM 5/2/2023     8:15 AM 6/15/2023     8:00 AM   Anticoagulation Monitoring   INR 4.1 2.4 3.8 3.0 3.6 3.8 5.2   INR Date 1/27/2023 2/14/2023 3/14/2023 3/29/2023 4/12/2023 5/2/2023 6/15/2023   INR Goal 2.5-3.5 2.5-3.5 2.5-3.5 2.5-3.5 2.5-3.5 2.5-3.5 2.5-3.5   Trend Same Same Same Same Same Down Same   Last Week Total 52.5 mg 52.5 mg 52.5 mg 52.5 mg 47.5 mg 52.5 mg 52.5 mg   Next Week Total 50 mg 52.5 mg 50 mg 52.5 mg 52.5 mg 48.75 mg 41.25 mg   Sun 7.5 mg 7.5 mg 7.5 mg 7.5 mg 7.5 mg 3.75 mg 3.75 mg   Mon 7.5 mg 7.5 mg 7.5 mg 7.5 mg 7.5 mg 7.5 mg 7.5 mg   Tue 7.5 mg 7.5 mg 5 mg (3/14); Otherwise 7.5 mg 7.5 mg 7.5 mg 7.5 mg 7.5 mg   Wed 7.5 mg 7.5 mg 7.5 mg 7.5 mg 7.5 mg 7.5 mg 7.5 mg   Thu 7.5 mg 7.5 mg 7.5 mg 7.5 mg 7.5 mg 7.5 mg Hold (6/15)   Fri 7.5 mg 7.5 mg 7.5 mg 7.5 mg 7.5 mg 7.5 mg 7.5 mg   Sat 5 mg (1/28); Otherwise 7.5 mg 7.5 mg 7.5 mg 7.5 mg 7.5 mg 7.5 mg 7.5 mg       Plan:  1. INR is Supratherapeutic today- see above in Anticoagulation Summary.  Will instruct Stew Cabral to Change their warfarin regimen (HOLD today, then decrease back to 3.75 mg Sun, 7.5 mg all other days) - see above in Anticoagulation Summary.  2. Follow up in 1 week.  3. Patient declines warfarin refills.  4. Verbal and written information provided. To seek immediate medical attention if s/sx of bleeding develop or fall occurs. Patient expresses understanding and has no further questions at this time.    Gael Turner, PharmD

## 2023-07-26 ENCOUNTER — ANTICOAGULATION VISIT (OUTPATIENT)
Dept: PHARMACY | Facility: HOSPITAL | Age: 63
End: 2023-07-26
Payer: COMMERCIAL

## 2023-07-26 DIAGNOSIS — Z95.2 HX OF AORTIC VALVE REPLACEMENT, MECHANICAL: Primary | ICD-10-CM

## 2023-07-26 LAB
INR PPP: 2 (ref 0.91–1.09)
PROTHROMBIN TIME: 24.1 SECONDS (ref 10–13.8)

## 2023-07-26 PROCEDURE — 85610 PROTHROMBIN TIME: CPT

## 2023-07-26 PROCEDURE — G0463 HOSPITAL OUTPT CLINIC VISIT: HCPCS

## 2023-07-26 PROCEDURE — 36416 COLLJ CAPILLARY BLOOD SPEC: CPT

## 2023-07-26 NOTE — PROGRESS NOTES
Anticoagulation Clinic Progress Note    Anticoagulation Summary  As of 2023      INR goal:  2.5-3.5   TTR:  42.2 % (4.8 y)   INR used for dosin.0 (2023)   Warfarin maintenance plan:  3.75 mg every Sun; 7.5 mg all other days   Weekly warfarin total:  48.75 mg   Plan last modified:  Gael Turner, PharmD (2023)   Next INR check:  2023   Priority:  Maintenance   Target end date:  Indefinite    Indications    Hx of aortic valve replacement  mechanical [Z95.2] [Z95.2]                 Anticoagulation Episode Summary       INR check location:      Preferred lab:      Send INR reminders to:  [facility]    Comments:            Anticoagulation Care Providers       Provider Role Specialty Phone number    Kitty Lagunas MD Referring Cardiology [phone]            Clinic Interview:  Patient Findings     Positives:  Missed doses    Negatives:  Signs/symptoms of thrombosis, Signs/symptoms of bleeding,   Laboratory test error suspected, Change in health, Change in alcohol use,   Change in activity, Upcoming invasive procedure, Emergency department   visit, Upcoming dental procedure, Extra doses, Change in medications,   Change in diet/appetite, Hospital admission, Bruising, Other complaints      Clinical Outcomes     Negatives:  Major bleeding event, Thromboembolic event,   Anticoagulation-related hospital admission, Anticoagulation-related ED   visit, Anticoagulation-related fatality        INR History:      3/14/2023     8:45 AM 3/29/2023     8:00 AM 2023     8:00 AM 2023     8:15 AM 6/15/2023     8:00 AM 2023     2:45 PM 2023     8:30 AM   Anticoagulation Monitoring   INR 3.8 3.0 3.6 3.8 5.2 3.4 2.0   INR Date 3/14/2023 3/29/2023 2023 2023 6/15/2023 2023 2023   INR Goal 2.5-3.5 2.5-3.5 2.5-3.5 2.5-3.5 2.5-3.5 2.5-3.5 2.5-3.5   Trend Same Same Same Down Same Same Same   Last Week Total 52.5 mg 52.5 mg 47.5 mg 52.5 mg 52.5 mg 48.75 mg 41.25 mg    Next Week Total 50 mg 52.5 mg 52.5 mg 48.75 mg 41.25 mg 48.75 mg 52.5 mg   Sun 7.5 mg 7.5 mg 7.5 mg 3.75 mg 3.75 mg 3.75 mg 3.75 mg   Mon 7.5 mg 7.5 mg 7.5 mg 7.5 mg 7.5 mg 7.5 mg 7.5 mg   Tue 5 mg (3/14); Otherwise 7.5 mg 7.5 mg 7.5 mg 7.5 mg 7.5 mg 7.5 mg 7.5 mg   Wed 7.5 mg 7.5 mg 7.5 mg 7.5 mg 7.5 mg 7.5 mg 11.25 mg (7/26); Otherwise 7.5 mg   Thu 7.5 mg 7.5 mg 7.5 mg 7.5 mg Hold (6/15) 7.5 mg 7.5 mg   Fri 7.5 mg 7.5 mg 7.5 mg 7.5 mg 7.5 mg 7.5 mg 7.5 mg   Sat 7.5 mg 7.5 mg 7.5 mg 7.5 mg 7.5 mg 7.5 mg 7.5 mg       Plan:  1. INR is Subtherapeutic today- see above in Anticoagulation Summary.  Will instruct Stew GABINO Cabral to Increase their warfarin regimen- see above in Anticoagulation Summary.  2. Follow up in 2 weeks  3. Patient declines warfarin refills.  4. Verbal and written information provided. Patient expresses understanding and has no further questions at this time.    Merlyn Hassan [facility]

## 2023-07-27 NOTE — PROGRESS NOTES
Anticoagulation Clinic Progress Note    Anticoagulation Summary  As of 10/14/2022    INR goal:  2.5-3.5   TTR:  43.9 % (4.1 y)   INR used for dosin.0 (10/14/2022)   Warfarin maintenance plan:  7.5 mg every day   Weekly warfarin total:  52.5 mg   Plan last modified:  Merlyn Hassan RPH (2022)   Next INR check:  10/28/2022   Priority:  Maintenance   Target end date:  Indefinite    Indications    Hx of aortic valve replacement  mechanical [Z95.2] [Z95.2]             Anticoagulation Episode Summary     INR check location:      Preferred lab:      Send INR reminders to:  [facility]    Comments:        Anticoagulation Care Providers     Provider Role Specialty Phone number    Kitty Lagunas MD Referring Cardiology [phone]          Clinic Interview:  Patient Findings     Negatives:  Signs/symptoms of thrombosis, Signs/symptoms of bleeding,   Laboratory test error suspected, Change in health, Change in alcohol use,   Change in activity, Upcoming invasive procedure, Emergency department   visit, Upcoming dental procedure, Missed doses, Extra doses, Change in   medications, Change in diet/appetite, Hospital admission, Bruising, Other   complaints      Clinical Outcomes     Negatives:  Major bleeding event, Thromboembolic event,   Anticoagulation-related hospital admission, Anticoagulation-related ED   visit, Anticoagulation-related fatality        INR History:  Anticoagulation Monitoring 9/15/2022 2022 10/14/2022   INR 3.9 3.2 2.0   INR Date 9/15/2022 2022 10/14/2022   INR Goal 2.5-3.5 2.5-3.5 2.5-3.5   Trend Down Up Same   Last Week Total 52.5 mg 52.5 mg 52.5 mg   Next Week Total 45 mg 52.5 mg 55 mg   Sun 7.5 mg 7.5 mg 7.5 mg   Mon 7.5 mg 7.5 mg 7.5 mg   Tue 7.5 mg 7.5 mg 7.5 mg   Wed 7.5 mg 7.5 mg 7.5 mg   Thu Hold (9/15); Otherwise 5 mg 7.5 mg 7.5 mg   Fri 7.5 mg 7.5 mg 10 mg (10/14); Otherwise 7.5 mg   Sat 7.5 mg 7.5 mg 7.5 mg   Visit Report - - -   Some recent data might be  hidden       Plan:  1. INR is Subtherapeutic today- see above in Anticoagulation Summary.  Will instruct Stew Cabral to Change their warfarin regimen- see above in Anticoagulation Summary.  2. Follow up in 2 weeks  3. Patient declines warfarin refills.  4. Verbal and written information provided. Patient expresses understanding and has no further questions at this time.    Gael Turner, PharmD   decreased ability to use arms for pushing/pulling/decreased ability to use legs for bridging/pushing

## 2023-08-08 ENCOUNTER — ANTICOAGULATION VISIT (OUTPATIENT)
Dept: PHARMACY | Facility: HOSPITAL | Age: 63
End: 2023-08-08
Payer: COMMERCIAL

## 2023-08-08 DIAGNOSIS — Z95.2 HX OF AORTIC VALVE REPLACEMENT, MECHANICAL: Primary | ICD-10-CM

## 2023-08-08 LAB
INR PPP: 4.5 (ref 0.91–1.09)
PROTHROMBIN TIME: 54.4 SECONDS (ref 10–13.8)

## 2023-08-08 PROCEDURE — 85610 PROTHROMBIN TIME: CPT

## 2023-08-08 PROCEDURE — G0463 HOSPITAL OUTPT CLINIC VISIT: HCPCS

## 2023-08-08 PROCEDURE — 36416 COLLJ CAPILLARY BLOOD SPEC: CPT

## 2023-08-08 NOTE — PROGRESS NOTES
I have supervised and reviewed the notes, assessments, and/or procedures performed by our Pharmacy Intern. The documented assessment and plan were developed cooperatively, and the plan was implemented in my presence. I concur with the documentation of this patient encounter.    Merlyn Hassan [facility]

## 2023-08-08 NOTE — PROGRESS NOTES
Anticoagulation Clinic Progress Note    Anticoagulation Summary  As of 2023      INR goal:  2.5-3.5   TTR:  42.2 % (4.9 y)   INR used for dosin.5 (2023)   Warfarin maintenance plan:  3.75 mg every Sun; 7.5 mg all other days   Weekly warfarin total:  48.75 mg   Plan last modified:  Merlyn Hassan RPH (2023)   Next INR check:  2023   Priority:  Maintenance   Target end date:  Indefinite    Indications    Hx of aortic valve replacement  mechanical [Z95.2] [Z95.2]                 Anticoagulation Episode Summary       INR check location:      Preferred lab:      Send INR reminders to:   [facility]    Comments:            Anticoagulation Care Providers       Provider Role Specialty Phone number    Kitty Lagunas MD Referring Cardiology [phone]            Clinic Interview:  Patient Findings     Negatives:  Signs/symptoms of thrombosis, Signs/symptoms of bleeding,   Laboratory test error suspected, Change in health, Change in alcohol use,   Change in activity, Upcoming invasive procedure, Emergency department   visit, Upcoming dental procedure, Missed doses, Extra doses, Change in   medications, Change in diet/appetite, Hospital admission, Bruising, Other   complaints      Clinical Outcomes     Negatives:  Major bleeding event, Thromboembolic event,   Anticoagulation-related hospital admission, Anticoagulation-related ED   visit, Anticoagulation-related fatality        INR History:      3/29/2023     8:00 AM 2023     8:00 AM 2023     8:15 AM 6/15/2023     8:00 AM 2023     2:45 PM 2023     8:30 AM 2023     8:30 AM   Anticoagulation Monitoring   INR 3.0 3.6 3.8 5.2 3.4 2.0 4.5   INR Date 3/29/2023 2023 2023 6/15/2023 2023 2023 2023   INR Goal 2.5-3.5 2.5-3.5 2.5-3.5 2.5-3.5 2.5-3.5 2.5-3.5 2.5-3.5   Trend Same Same Down Same Same Same Same   Last Week Total 52.5 mg 47.5 mg 52.5 mg 52.5 mg 48.75 mg 41.25 mg 48.75 mg   Next Week Total  52.5 mg 52.5 mg 48.75 mg 41.25 mg 48.75 mg 52.5 mg 41.25 mg   Sun 7.5 mg 7.5 mg 3.75 mg 3.75 mg 3.75 mg 3.75 mg 3.75 mg   Mon 7.5 mg 7.5 mg 7.5 mg 7.5 mg 7.5 mg 7.5 mg 7.5 mg   Tue 7.5 mg 7.5 mg 7.5 mg 7.5 mg 7.5 mg 7.5 mg Hold (8/8); Otherwise 7.5 mg   Wed 7.5 mg 7.5 mg 7.5 mg 7.5 mg 7.5 mg 11.25 mg (7/26); Otherwise 7.5 mg 7.5 mg   Thu 7.5 mg 7.5 mg 7.5 mg Hold (6/15) 7.5 mg 7.5 mg 7.5 mg   Fri 7.5 mg 7.5 mg 7.5 mg 7.5 mg 7.5 mg 7.5 mg 7.5 mg   Sat 7.5 mg 7.5 mg 7.5 mg 7.5 mg 7.5 mg 7.5 mg 7.5 mg       Plan:  1. INR is Supratherapeutic today- see above in Anticoagulation Summary.  Will instruct Stew Cabral to Change their warfarin regimen- see above in Anticoagulation Summary.  Hold today, then resume 3.75 mg Sun and 7.5 mg AOD.   2. Follow up in 2 weeks  3. Patient declines warfarin refills.  4. Verbal and written information provided. Patient expresses understanding and has no further questions at this time.    Joanne Winn, Pharmacy Intern

## 2023-09-25 ENCOUNTER — ANTICOAGULATION VISIT (OUTPATIENT)
Dept: PHARMACY | Facility: HOSPITAL | Age: 63
End: 2023-09-25

## 2023-09-25 DIAGNOSIS — Z95.2 HX OF AORTIC VALVE REPLACEMENT, MECHANICAL: Primary | ICD-10-CM

## 2023-09-25 LAB
INR PPP: 4.4 (ref 0.91–1.09)
PROTHROMBIN TIME: 52.7 SECONDS (ref 10–13.8)

## 2023-09-25 PROCEDURE — 85610 PROTHROMBIN TIME: CPT

## 2023-09-25 PROCEDURE — 36416 COLLJ CAPILLARY BLOOD SPEC: CPT

## 2023-09-25 PROCEDURE — G0463 HOSPITAL OUTPT CLINIC VISIT: HCPCS

## 2023-09-25 NOTE — PROGRESS NOTES
Anticoagulation Clinic Progress Note    Anticoagulation Summary  As of 2023      INR goal:  2.5-3.5   TTR:  41.1 % (5 y)   INR used for dosin.4 (2023)   Warfarin maintenance plan:  3.75 mg every Sun, Thu; 7.5 mg all other days   Weekly warfarin total:  45 mg   Plan last modified:  Merlyn Hassan RPH (2023)   Next INR check:  10/9/2023   Priority:  Maintenance   Target end date:  Indefinite    Indications    Hx of aortic valve replacement  mechanical [Z95.2] [Z95.2]                 Anticoagulation Episode Summary       INR check location:      Preferred lab:      Send INR reminders to:  [facility]    Comments:            Anticoagulation Care Providers       Provider Role Specialty Phone number    Kitty Lagunas MD Referring Cardiology [phone]            Clinic Interview:  Patient Findings     Negatives:  Signs/symptoms of thrombosis, Signs/symptoms of bleeding,   Laboratory test error suspected, Change in health, Change in alcohol use,   Change in activity, Upcoming invasive procedure, Emergency department   visit, Upcoming dental procedure, Missed doses, Extra doses, Change in   medications, Change in diet/appetite, Hospital admission, Bruising, Other   complaints    Comments:  He thinks he may have taken a double dose due to elevated INR   and his work schedule. However, he couldn't confirm if he actually did   take extra.       Clinical Outcomes     Negatives:  Major bleeding event, Thromboembolic event,   Anticoagulation-related hospital admission, Anticoagulation-related ED   visit, Anticoagulation-related fatality    Comments:  He thinks he may have taken a double dose due to elevated INR   and his work schedule. However, he couldn't confirm if he actually did   take extra.         INR History:      2023     8:00 AM 2023     8:15 AM 6/15/2023     8:00 AM 2023     2:45 PM 2023     8:30 AM 2023     8:30 AM 2023     8:15 AM    Anticoagulation Monitoring   INR 3.6 3.8 5.2 3.4 2.0 4.5 4.4   INR Date 4/12/2023 5/2/2023 6/15/2023 7/11/2023 7/26/2023 8/8/2023 9/25/2023   INR Goal 2.5-3.5 2.5-3.5 2.5-3.5 2.5-3.5 2.5-3.5 2.5-3.5 2.5-3.5   Trend Same Down Same Same Same Same Down   Last Week Total 47.5 mg 52.5 mg 52.5 mg 48.75 mg 41.25 mg 48.75 mg 48.75 mg   Next Week Total 52.5 mg 48.75 mg 41.25 mg 48.75 mg 52.5 mg 41.25 mg 37.5 mg   Sun 7.5 mg 3.75 mg 3.75 mg 3.75 mg 3.75 mg 3.75 mg 3.75 mg   Mon 7.5 mg 7.5 mg 7.5 mg 7.5 mg 7.5 mg 7.5 mg Hold (9/25); Otherwise 7.5 mg   Tue 7.5 mg 7.5 mg 7.5 mg 7.5 mg 7.5 mg Hold (8/8); Otherwise 7.5 mg 7.5 mg   Wed 7.5 mg 7.5 mg 7.5 mg 7.5 mg 11.25 mg (7/26); Otherwise 7.5 mg 7.5 mg 7.5 mg   Thu 7.5 mg 7.5 mg Hold (6/15) 7.5 mg 7.5 mg 7.5 mg 3.75 mg   Fri 7.5 mg 7.5 mg 7.5 mg 7.5 mg 7.5 mg 7.5 mg 7.5 mg   Sat 7.5 mg 7.5 mg 7.5 mg 7.5 mg 7.5 mg 7.5 mg 7.5 mg       Plan:  1. INR is Supratherapeutic today- see above in Anticoagulation Summary.  Will instruct Stew GABINO Cabral to Change their warfarin regimen- see above in Anticoagulation Summary.  Due to INR being elevated twice, hold and then trial on 3.75 mg sun, thurs, 7.5 mg AOD, rck 2 weeks   2. Follow up in 2 weeks  3. Patient declines warfarin refills.  4. Verbal and written information provided. Patient expresses understanding and has no further questions at this time.    Merlyn Hassan [facility]

## 2023-10-11 ENCOUNTER — ANTICOAGULATION VISIT (OUTPATIENT)
Dept: PHARMACY | Facility: HOSPITAL | Age: 63
End: 2023-10-11
Payer: COMMERCIAL

## 2023-10-11 DIAGNOSIS — Z95.2 HX OF AORTIC VALVE REPLACEMENT, MECHANICAL: Primary | ICD-10-CM

## 2023-10-11 LAB
INR PPP: 4 (ref 0.91–1.09)
PROTHROMBIN TIME: 48 SECONDS (ref 10–13.8)

## 2023-10-11 PROCEDURE — G0463 HOSPITAL OUTPT CLINIC VISIT: HCPCS

## 2023-10-11 PROCEDURE — 85610 PROTHROMBIN TIME: CPT

## 2023-10-11 PROCEDURE — 36416 COLLJ CAPILLARY BLOOD SPEC: CPT

## 2023-10-11 NOTE — PROGRESS NOTES
Anticoagulation Clinic Progress Note    Anticoagulation Summary  As of 10/11/2023      INR goal:  2.5-3.5   TTR:  40.8% (5.1 y)   INR used for dosin.0 (10/11/2023)   Warfarin maintenance plan:  3.75 mg every Sun, Thu; 7.5 mg all other days   Weekly warfarin total:  45 mg   Plan last modified:  Merlyn Hassan RPH (2023)   Next INR check:  10/25/2023   Priority:  Maintenance   Target end date:  Indefinite    Indications    Hx of aortic valve replacement  mechanical [Z95.2] [Z95.2]                 Anticoagulation Episode Summary       INR check location:      Preferred lab:      Send INR reminders to:  [facility]    Comments:            Anticoagulation Care Providers       Provider Role Specialty Phone number    Kitty Lagunas MD Referring Cardiology [phone]            Clinic Interview:  Patient Findings     Positives:  Extra doses    Negatives:  Signs/symptoms of thrombosis, Signs/symptoms of bleeding,   Laboratory test error suspected, Change in health, Change in alcohol use,   Change in activity, Upcoming invasive procedure, Emergency department   visit, Upcoming dental procedure, Missed doses, Change in medications,   Change in diet/appetite, Hospital admission, Bruising, Other complaints    Comments:  He believes he recently took 7.5 mg on a day he was supposed   to take 3.75 mg.       Clinical Outcomes     Negatives:  Major bleeding event, Thromboembolic event,   Anticoagulation-related hospital admission, Anticoagulation-related ED   visit, Anticoagulation-related fatality    Comments:  He believes he recently took 7.5 mg on a day he was supposed   to take 3.75 mg.         INR History:      2023     8:15 AM 6/15/2023     8:00 AM 2023     2:45 PM 2023     8:30 AM 2023     8:30 AM 2023     8:15 AM 10/11/2023     8:15 AM   Anticoagulation Monitoring   INR 3.8 5.2 3.4 2.0 4.5 4.4 4.0   INR Date 2023 6/15/2023 2023 2023 2023 2023  10/11/2023   INR Goal 2.5-3.5 2.5-3.5 2.5-3.5 2.5-3.5 2.5-3.5 2.5-3.5 2.5-3.5   Trend Down Same Same Same Same Down Same   Last Week Total 52.5 mg 52.5 mg 48.75 mg 41.25 mg 48.75 mg 48.75 mg 48.75 mg   Next Week Total 48.75 mg 41.25 mg 48.75 mg 52.5 mg 41.25 mg 37.5 mg 41.25 mg   Sun 3.75 mg 3.75 mg 3.75 mg 3.75 mg 3.75 mg 3.75 mg 3.75 mg   Mon 7.5 mg 7.5 mg 7.5 mg 7.5 mg 7.5 mg Hold (9/25); Otherwise 7.5 mg 7.5 mg   Tue 7.5 mg 7.5 mg 7.5 mg 7.5 mg Hold (8/8); Otherwise 7.5 mg 7.5 mg 7.5 mg   Wed 7.5 mg 7.5 mg 7.5 mg 11.25 mg (7/26); Otherwise 7.5 mg 7.5 mg 7.5 mg 3.75 mg (10/11); Otherwise 7.5 mg   Thu 7.5 mg Hold (6/15) 7.5 mg 7.5 mg 7.5 mg 3.75 mg 3.75 mg   Fri 7.5 mg 7.5 mg 7.5 mg 7.5 mg 7.5 mg 7.5 mg 7.5 mg   Sat 7.5 mg 7.5 mg 7.5 mg 7.5 mg 7.5 mg 7.5 mg 7.5 mg       Plan:  1. INR is Supratherapeutic today- see above in Anticoagulation Summary.  Will instruct Stew Cabral to Change their warfarin regimen (3.75 mg today, then resume 3.75 mg Sun/Thurs, 7.5 mg all other days) - see above in Anticoagulation Summary.  2. Follow up in 2 weeks.  3. Patient declines warfarin refills.  4. Verbal and written information provided. Patient expresses understanding and has no further questions at this time.    Gael Turner, PharmD

## 2023-10-25 ENCOUNTER — ANTICOAGULATION VISIT (OUTPATIENT)
Dept: PHARMACY | Facility: HOSPITAL | Age: 63
End: 2023-10-25
Payer: COMMERCIAL

## 2023-10-25 DIAGNOSIS — Z95.2 HX OF AORTIC VALVE REPLACEMENT, MECHANICAL: Primary | ICD-10-CM

## 2023-10-25 LAB
INR PPP: 3 (ref 0.91–1.09)
PROTHROMBIN TIME: 36.5 SECONDS (ref 10–13.8)

## 2023-10-25 PROCEDURE — 85610 PROTHROMBIN TIME: CPT

## 2023-10-25 PROCEDURE — 36416 COLLJ CAPILLARY BLOOD SPEC: CPT

## 2023-10-25 RX ORDER — WARFARIN SODIUM 7.5 MG/1
TABLET ORAL
Qty: 85 TABLET | Refills: 1 | Status: SHIPPED | OUTPATIENT
Start: 2023-10-25

## 2023-10-25 NOTE — PROGRESS NOTES
Anticoagulation Clinic Progress Note    Anticoagulation Summary  As of 10/25/2023      INR goal:  2.5-3.5   TTR:  40.8% (5.1 y)   INR used for dosing:  3.0 (10/25/2023)   Warfarin maintenance plan:  3.75 mg every Sun, Thu; 7.5 mg all other days   Weekly warfarin total:  45 mg   No change documented:  Grooms, Nancy, Pharmacy Intern   Plan last modified:  Merlyn Hassan RPH (9/25/2023)   Next INR check:  11/9/2023   Priority:  Maintenance   Target end date:  Indefinite    Indications    Hx of aortic valve replacement  mechanical [Z95.2] [Z95.2]                 Anticoagulation Episode Summary       INR check location:      Preferred lab:      Send INR reminders to:  [facility]    Comments:            Anticoagulation Care Providers       Provider Role Specialty Phone number    Kitty Lagunas MD Referring Cardiology [phone]            Clinic Interview:  Patient Findings     Negatives:  Signs/symptoms of thrombosis, Signs/symptoms of bleeding,   Laboratory test error suspected, Change in health, Change in alcohol use,   Change in activity, Upcoming invasive procedure, Emergency department   visit, Upcoming dental procedure, Missed doses, Extra doses, Change in   medications, Change in diet/appetite, Hospital admission, Bruising, Other   complaints      Clinical Outcomes     Negatives:  Major bleeding event, Thromboembolic event,   Anticoagulation-related hospital admission, Anticoagulation-related ED   visit, Anticoagulation-related fatality        INR History:      6/15/2023     8:00 AM 7/11/2023     2:45 PM 7/26/2023     8:30 AM 8/8/2023     8:30 AM 9/25/2023     8:15 AM 10/11/2023     8:15 AM 10/25/2023     8:15 AM   Anticoagulation Monitoring   INR 5.2 3.4 2.0 4.5 4.4 4.0 3.0   INR Date 6/15/2023 7/11/2023 7/26/2023 8/8/2023 9/25/2023 10/11/2023 10/25/2023   INR Goal 2.5-3.5 2.5-3.5 2.5-3.5 2.5-3.5 2.5-3.5 2.5-3.5 2.5-3.5   Trend Same Same Same Same Down Same Same   Last Week Total 52.5  mg 48.75 mg 41.25 mg 48.75 mg 48.75 mg 48.75 mg 45 mg   Next Week Total 41.25 mg 48.75 mg 52.5 mg 41.25 mg 37.5 mg 41.25 mg 45 mg   Sun 3.75 mg 3.75 mg 3.75 mg 3.75 mg 3.75 mg 3.75 mg 3.75 mg   Mon 7.5 mg 7.5 mg 7.5 mg 7.5 mg Hold (9/25); Otherwise 7.5 mg 7.5 mg 7.5 mg   Tue 7.5 mg 7.5 mg 7.5 mg Hold (8/8); Otherwise 7.5 mg 7.5 mg 7.5 mg 7.5 mg   Wed 7.5 mg 7.5 mg 11.25 mg (7/26); Otherwise 7.5 mg 7.5 mg 7.5 mg 3.75 mg (10/11); Otherwise 7.5 mg 7.5 mg   Thu Hold (6/15) 7.5 mg 7.5 mg 7.5 mg 3.75 mg 3.75 mg 3.75 mg   Fri 7.5 mg 7.5 mg 7.5 mg 7.5 mg 7.5 mg 7.5 mg 7.5 mg   Sat 7.5 mg 7.5 mg 7.5 mg 7.5 mg 7.5 mg 7.5 mg 7.5 mg       Plan:  1. INR is Therapeutic today- see above in Anticoagulation Summary.  Will instruct Stew Cabral to Continue their warfarin regimen- see above in Anticoagulation Summary.  2. Follow up in 2 weeks  3. Patient desires warfarin refills.  4. Verbal and written information provided. Patient expresses understanding and has no further questions at this time.    Nancy East, Pharmacy Intern

## 2023-11-09 ENCOUNTER — ANTICOAGULATION VISIT (OUTPATIENT)
Dept: PHARMACY | Facility: HOSPITAL | Age: 63
End: 2023-11-09
Payer: COMMERCIAL

## 2023-11-09 DIAGNOSIS — Z95.2 HX OF AORTIC VALVE REPLACEMENT, MECHANICAL: Primary | ICD-10-CM

## 2023-11-09 LAB
INR PPP: 3.3 (ref 0.91–1.09)
PROTHROMBIN TIME: 39.5 SECONDS (ref 10–13.8)

## 2023-11-09 PROCEDURE — 36416 COLLJ CAPILLARY BLOOD SPEC: CPT

## 2023-11-09 PROCEDURE — 85610 PROTHROMBIN TIME: CPT

## 2023-11-09 NOTE — PROGRESS NOTES
Anticoagulation Clinic Progress Note    Anticoagulation Summary  As of 11/9/2023      INR goal:  2.5-3.5   TTR:  41.3% (5.1 y)   INR used for dosing:  3.3 (11/9/2023)   Warfarin maintenance plan:  3.75 mg every Sun, Thu; 7.5 mg all other days   Weekly warfarin total:  45 mg   No change documented:  Huong Paniagua, Pharmacy Technician   Plan last modified:  Merlyn Hassan RPH (9/25/2023)   Next INR check:  12/7/2023   Priority:  Maintenance   Target end date:  Indefinite    Indications    Hx of aortic valve replacement  mechanical [Z95.2] [Z95.2]                 Anticoagulation Episode Summary       INR check location:      Preferred lab:      Send INR reminders to:  [facility]    Comments:            Anticoagulation Care Providers       Provider Role Specialty Phone number    Kitty Lagunas MD Referring Cardiology [phone]            Clinic Interview:  Patient Findings     Negatives:  Signs/symptoms of thrombosis, Signs/symptoms of bleeding,   Laboratory test error suspected, Change in health, Change in alcohol use,   Change in activity, Upcoming invasive procedure, Emergency department   visit, Upcoming dental procedure, Missed doses, Extra doses, Change in   medications, Change in diet/appetite, Hospital admission, Bruising, Other   complaints      Clinical Outcomes     Negatives:  Major bleeding event, Thromboembolic event,   Anticoagulation-related hospital admission, Anticoagulation-related ED   visit, Anticoagulation-related fatality        INR History:      7/11/2023     2:45 PM 7/26/2023     8:30 AM 8/8/2023     8:30 AM 9/25/2023     8:15 AM 10/11/2023     8:15 AM 10/25/2023     8:15 AM 11/9/2023     8:00 AM   Anticoagulation Monitoring   INR 3.4 2.0 4.5 4.4 4.0 3.0 3.3   INR Date 7/11/2023 7/26/2023 8/8/2023 9/25/2023 10/11/2023 10/25/2023 11/9/2023   INR Goal 2.5-3.5 2.5-3.5 2.5-3.5 2.5-3.5 2.5-3.5 2.5-3.5 2.5-3.5   Trend Same Same Same Down Same Same Same   Last Week Total 48.75  mg 41.25 mg 48.75 mg 48.75 mg 48.75 mg 45 mg 45 mg   Next Week Total 48.75 mg 52.5 mg 41.25 mg 37.5 mg 41.25 mg 45 mg 45 mg   Sun 3.75 mg 3.75 mg 3.75 mg 3.75 mg 3.75 mg 3.75 mg 3.75 mg   Mon 7.5 mg 7.5 mg 7.5 mg Hold (9/25); Otherwise 7.5 mg 7.5 mg 7.5 mg 7.5 mg   Tue 7.5 mg 7.5 mg Hold (8/8); Otherwise 7.5 mg 7.5 mg 7.5 mg 7.5 mg 7.5 mg   Wed 7.5 mg 11.25 mg (7/26); Otherwise 7.5 mg 7.5 mg 7.5 mg 3.75 mg (10/11); Otherwise 7.5 mg 7.5 mg 7.5 mg   Thu 7.5 mg 7.5 mg 7.5 mg 3.75 mg 3.75 mg 3.75 mg 3.75 mg   Fri 7.5 mg 7.5 mg 7.5 mg 7.5 mg 7.5 mg 7.5 mg 7.5 mg   Sat 7.5 mg 7.5 mg 7.5 mg 7.5 mg 7.5 mg 7.5 mg 7.5 mg       Plan:  1. INR is therapeutic today- see above in Anticoagulation Summary.   Will instruct Stew Cabral to continue their warfarin regimen- see above in Anticoagulation Summary.  2. Follow up in 4 weeks.  3. Patient declines warfarin refills.  4. Verbal and written information provided. Patient expresses understanding and has no further questions at this time.    Huong Paniagua, Pharmacy Technician

## 2023-12-07 ENCOUNTER — ANTICOAGULATION VISIT (OUTPATIENT)
Dept: PHARMACY | Facility: HOSPITAL | Age: 63
End: 2023-12-07
Payer: COMMERCIAL

## 2023-12-07 DIAGNOSIS — Z95.2 HX OF AORTIC VALVE REPLACEMENT, MECHANICAL: Primary | ICD-10-CM

## 2023-12-07 LAB
INR PPP: 3 (ref 0.91–1.09)
PROTHROMBIN TIME: 36.3 SECONDS (ref 10–13.8)

## 2023-12-07 PROCEDURE — 85610 PROTHROMBIN TIME: CPT

## 2023-12-07 PROCEDURE — 36416 COLLJ CAPILLARY BLOOD SPEC: CPT

## 2023-12-07 NOTE — PROGRESS NOTES
Anticoagulation Clinic Progress Note    Anticoagulation Summary  As of 12/7/2023      INR goal:  2.5-3.5   TTR:  42.2% (5.2 y)   INR used for dosing:  3.0 (12/7/2023)   Warfarin maintenance plan:  3.75 mg every Sun, Thu; 7.5 mg all other days   Weekly warfarin total:  45 mg   No change documented:  Huong Paniagua, Pharmacy Technician   Plan last modified:  Merlyn Hassan RPH (9/25/2023)   Next INR check:  1/4/2024   Priority:  Maintenance   Target end date:  Indefinite    Indications    Hx of aortic valve replacement  mechanical [Z95.2] [Z95.2]                 Anticoagulation Episode Summary       INR check location:      Preferred lab:      Send INR reminders to:  [facility]    Comments:            Anticoagulation Care Providers       Provider Role Specialty Phone number    Kitty Lagunas MD Referring Cardiology [phone]            Clinic Interview:  Patient Findings     Negatives:  Signs/symptoms of thrombosis, Signs/symptoms of bleeding,   Laboratory test error suspected, Change in health, Change in alcohol use,   Change in activity, Upcoming invasive procedure, Emergency department   visit, Upcoming dental procedure, Missed doses, Extra doses, Change in   medications, Change in diet/appetite, Hospital admission, Bruising, Other   complaints      Clinical Outcomes     Negatives:  Major bleeding event, Thromboembolic event,   Anticoagulation-related hospital admission, Anticoagulation-related ED   visit, Anticoagulation-related fatality        INR History:      7/26/2023     8:30 AM 8/8/2023     8:30 AM 9/25/2023     8:15 AM 10/11/2023     8:15 AM 10/25/2023     8:15 AM 11/9/2023     8:00 AM 12/7/2023     8:00 AM   Anticoagulation Monitoring   INR 2.0 4.5 4.4 4.0 3.0 3.3 3.0   INR Date 7/26/2023 8/8/2023 9/25/2023 10/11/2023 10/25/2023 11/9/2023 12/7/2023   INR Goal 2.5-3.5 2.5-3.5 2.5-3.5 2.5-3.5 2.5-3.5 2.5-3.5 2.5-3.5   Trend Same Same Down Same Same Same Same   Last Week Total 41.25  mg 48.75 mg 48.75 mg 48.75 mg 45 mg 45 mg 45 mg   Next Week Total 52.5 mg 41.25 mg 37.5 mg 41.25 mg 45 mg 45 mg 45 mg   Sun 3.75 mg 3.75 mg 3.75 mg 3.75 mg 3.75 mg 3.75 mg 3.75 mg   Mon 7.5 mg 7.5 mg Hold (9/25); Otherwise 7.5 mg 7.5 mg 7.5 mg 7.5 mg 7.5 mg   Tue 7.5 mg Hold (8/8); Otherwise 7.5 mg 7.5 mg 7.5 mg 7.5 mg 7.5 mg 7.5 mg   Wed 11.25 mg (7/26); Otherwise 7.5 mg 7.5 mg 7.5 mg 3.75 mg (10/11); Otherwise 7.5 mg 7.5 mg 7.5 mg 7.5 mg   Thu 7.5 mg 7.5 mg 3.75 mg 3.75 mg 3.75 mg 3.75 mg 3.75 mg   Fri 7.5 mg 7.5 mg 7.5 mg 7.5 mg 7.5 mg 7.5 mg 7.5 mg   Sat 7.5 mg 7.5 mg 7.5 mg 7.5 mg 7.5 mg 7.5 mg 7.5 mg       Plan:  1. INR is therapeutic today- see above in Anticoagulation Summary.   Will instruct Stew Cabral to continue their warfarin regimen- see above in Anticoagulation Summary.  2. Follow up in 4 weeks.  3. Patient declines warfarin refills.  4. Verbal and written information provided. Patient expresses understanding and has no further questions at this time.    Huong Paniagua, Pharmacy Technician

## 2024-01-04 ENCOUNTER — ANTICOAGULATION VISIT (OUTPATIENT)
Dept: PHARMACY | Facility: HOSPITAL | Age: 64
End: 2024-01-04
Payer: COMMERCIAL

## 2024-01-04 DIAGNOSIS — Z95.2 HX OF AORTIC VALVE REPLACEMENT, MECHANICAL: Primary | ICD-10-CM

## 2024-01-04 LAB
INR PPP: 4.3 (ref 0.91–1.09)
PROTHROMBIN TIME: 51.4 SECONDS (ref 10–13.8)

## 2024-01-04 PROCEDURE — 36416 COLLJ CAPILLARY BLOOD SPEC: CPT

## 2024-01-04 PROCEDURE — G0463 HOSPITAL OUTPT CLINIC VISIT: HCPCS

## 2024-01-04 PROCEDURE — 85610 PROTHROMBIN TIME: CPT

## 2024-01-04 RX ORDER — WARFARIN SODIUM 7.5 MG/1
TABLET ORAL
Qty: 85 TABLET | Refills: 1 | Status: SHIPPED | OUTPATIENT
Start: 2024-01-04

## 2024-01-04 NOTE — PROGRESS NOTES
Anticoagulation Clinic Progress Note    Anticoagulation Summary  As of 2024      INR goal:  2.5-3.5   TTR:  42.1% (5.3 y)   INR used for dosin.3 (2024)   Warfarin maintenance plan:  3.75 mg every Sun, Thu; 7.5 mg all other days   Weekly warfarin total:  45 mg   Plan last modified:  Merlyn Hassan RPH (2023)   Next INR check:  2024   Priority:  Maintenance   Target end date:  Indefinite    Indications    Hx of aortic valve replacement  mechanical [Z95.2] [Z95.2]                 Anticoagulation Episode Summary       INR check location:      Preferred lab:      Send INR reminders to:   [facility] [place]    Comments:            Anticoagulation Care Providers       Provider Role Specialty Phone number    Kitty Lagunas MD Referring Cardiology [phone]            Clinic Interview:  Patient Findings     Positives:  Change in medications    Negatives:  Signs/symptoms of thrombosis, Signs/symptoms of bleeding,   Laboratory test error suspected, Change in health, Change in alcohol use,   Change in activity, Upcoming invasive procedure, Emergency department   visit, Upcoming dental procedure, Missed doses, Extra doses, Change in   diet/appetite, Hospital admission, Bruising, Other complaints    Comments:  Patient reports that he is using 5 mg tablets because he   cannot find or does not have the 7.5 mg tablets. He is taking 7.5 mg daily   EXCEPT on  and Thursday he is taking 2.5 mg ( half of a 5 mg   tablet). He denies other changes. Reminded him that he is past due to see   Dr Lagunas (last 2022); and that he needs to get scheduled ASAP.  Sent   new prescription to [facility] for 7.5 mg since patient was unsure if he had   refills or not.      Clinical Outcomes     Negatives:  Major bleeding event, Thromboembolic event,   Anticoagulation-related hospital admission, Anticoagulation-related ED   visit, Anticoagulation-related fatality    Comments:  Patient reports that he is using 5 mg  tablets because he   cannot find or does not have the 7.5 mg tablets. He is taking 7.5 mg daily   EXCEPT on Sunday and Thursday he is taking 2.5 mg ( half of a 5 mg   tablet). He denies other changes. Reminded him that he is past due to see   Dr Lagunas (last OV 2022); and that he needs to get scheduled ASAP.  Sent   new prescription to [facility] for 7.5 mg since patient was unsure if he had   refills or not.        INR History:      8/8/2023     8:30 AM 9/25/2023     8:15 AM 10/11/2023     8:15 AM 10/25/2023     8:15 AM 11/9/2023     8:00 AM 12/7/2023     8:00 AM 1/4/2024     8:15 AM   Anticoagulation Monitoring   INR 4.5 4.4 4.0 3.0 3.3 3.0 4.3   INR Date 8/8/2023 9/25/2023 10/11/2023 10/25/2023 11/9/2023 12/7/2023 1/4/2024   INR Goal 2.5-3.5 2.5-3.5 2.5-3.5 2.5-3.5 2.5-3.5 2.5-3.5 2.5-3.5   Trend Same Down Same Same Same Same Same   Last Week Total 48.75 mg 48.75 mg 48.75 mg 45 mg 45 mg 45 mg 43.75 mg   Next Week Total 41.25 mg 37.5 mg 41.25 mg 45 mg 45 mg 45 mg 41.25 mg   Sun 3.75 mg 3.75 mg 3.75 mg 3.75 mg 3.75 mg 3.75 mg 3.75 mg   Mon 7.5 mg Hold (9/25); Otherwise 7.5 mg 7.5 mg 7.5 mg 7.5 mg 7.5 mg 7.5 mg   Tue Hold (8/8); Otherwise 7.5 mg 7.5 mg 7.5 mg 7.5 mg 7.5 mg 7.5 mg 7.5 mg   Wed 7.5 mg 7.5 mg 3.75 mg (10/11); Otherwise 7.5 mg 7.5 mg 7.5 mg 7.5 mg 7.5 mg   Thu 7.5 mg 3.75 mg 3.75 mg 3.75 mg 3.75 mg 3.75 mg Hold (1/4); Otherwise 3.75 mg   Fri 7.5 mg 7.5 mg 7.5 mg 7.5 mg 7.5 mg 7.5 mg 7.5 mg   Sat 7.5 mg 7.5 mg 7.5 mg 7.5 mg 7.5 mg 7.5 mg 7.5 mg       Plan:  1. INR is Supratherapeutic today- see above in Anticoagulation Summary.  Will instruct Stew Cabral to Change their warfarin regimen- see above in Anticoagulation Summary.  2. Follow up in 2 weeks  3. Patient desires warfarin refills.  4. Verbal and written information provided. Patient expresses understanding and has no further questions at this time.    Michelle Narvaez, PharmD

## 2024-01-23 ENCOUNTER — OFFICE VISIT (OUTPATIENT)
Dept: FAMILY MEDICINE CLINIC | Facility: CLINIC | Age: 64
End: 2024-01-23
Payer: COMMERCIAL

## 2024-01-23 VITALS
DIASTOLIC BLOOD PRESSURE: 68 MMHG | WEIGHT: 174 LBS | HEART RATE: 76 BPM | HEIGHT: 69 IN | BODY MASS INDEX: 25.77 KG/M2 | OXYGEN SATURATION: 98 % | SYSTOLIC BLOOD PRESSURE: 120 MMHG

## 2024-01-23 DIAGNOSIS — Z23 ENCOUNTER FOR IMMUNIZATION: ICD-10-CM

## 2024-01-23 DIAGNOSIS — Z00.00 ANNUAL PHYSICAL EXAM: ICD-10-CM

## 2024-01-23 DIAGNOSIS — Z13.9 DUE FOR SCREENING: ICD-10-CM

## 2024-01-23 DIAGNOSIS — R19.00 MASS OF SOFT TISSUE OF ABDOMEN: Primary | ICD-10-CM

## 2024-01-23 DIAGNOSIS — H61.23 BILATERAL IMPACTED CERUMEN: ICD-10-CM

## 2024-01-23 DIAGNOSIS — Z87.891 PERSONAL HISTORY OF TOBACCO USE, PRESENTING HAZARDS TO HEALTH: ICD-10-CM

## 2024-01-23 DIAGNOSIS — M79.89 MASS OF SOFT TISSUE OF NECK: ICD-10-CM

## 2024-01-23 DIAGNOSIS — Z12.11 ENCOUNTER FOR SCREENING FOR MALIGNANT NEOPLASM OF COLON: ICD-10-CM

## 2024-01-23 DIAGNOSIS — Z11.59 NEED FOR HEPATITIS C SCREENING TEST: ICD-10-CM

## 2024-01-23 PROBLEM — G47.26 SHIFT WORK SLEEP DISORDER: Status: ACTIVE | Noted: 2024-01-23

## 2024-01-23 PROBLEM — D48.5 NEOPLASM OF UNCERTAIN BEHAVIOR OF SKIN OF EYELID: Status: ACTIVE | Noted: 2022-06-15

## 2024-01-23 PROBLEM — F98.8 ADD (ATTENTION DEFICIT DISORDER): Status: ACTIVE | Noted: 2024-01-23

## 2024-01-23 PROBLEM — D48.5 NEOPLASM OF UNCERTAIN BEHAVIOR OF SKIN OF EYELID: Status: RESOLVED | Noted: 2022-06-15 | Resolved: 2024-01-23

## 2024-01-23 NOTE — PROGRESS NOTES
"Chief Complaint  Chief Complaint   Patient presents with    Establish Care     Right inner groin to lower abdomen numb/ couple weeks ago       Subjective    History of Present Illness  Stew Cabral is a 63 y.o. male presents to [facility] to establish care.  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 He has not been told that he snores.  Mood: Overall neutral, has some up and downs. No concerns.   Health goals: Four of his friend who he used to play poker with have passed away in the past 2 years, makes him wants to take better care of his health and his daughter is on his case about it too.   He's about to retire and wants to get back into working out, would like to lose some weight and clean up his diet.  Recently quit smoking, last cigarette 2 months ago using nicorette gum and toothpick at work, and is nervous about his lung scan. Smoked an average of 1ppd for 30 years.   Last summer he fell and hurt his back, resolved. A few weeks ago he was in an awkward position at work, and his right low back started hurting. Now he feels altered sensation from R groin up to right lower abdomen. No pain unless he bends to the right or sits too long. No difficulty peeing or pooping, no difficulty walking or balancing, no skin changes. 5 days ago he came home from work, was feeling fine, had an episode of emesis and dry heaved the rest of the day and night with worsening back pain, then self resolved.   He noticed a mass on the right side of his neck near the angle of his jaw recently. It does not hurt.     Current Outpatient Medications on File Prior to Visit   Medication Sig Dispense Refill    " Vyvanse 50 MG capsule TK 1 C QAM      warfarin (COUMADIN) 7.5 MG tablet Take one-half of a tablet (3.75 mg) by mouth on Sun/Thurs and take one tablet (7.5 mg) by mouth all other days or as directed 85 tablet 1     No current facility-administered medications on file prior to visit.       Patient Active Problem List   Diagnosis    History of aortic valve replacement with metallic valve    Displacement of lumbar intervertebral disc without myelopathy    Pain in extremity    Warfarin anticoagulation    Shift work sleep disorder    ADD (attention deficit disorder)    Mass of soft tissue of abdomen    Mass of soft tissue of neck    Personal history of tobacco use, presenting hazards to health       Past Medical History:   Diagnosis Date    ADD (attention deficit disorder)     Aortic valvar stenosis     Bicuspid aortic valve     CHF (congestive heart failure)     Displacement of lumbar intervertebral disc     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              Ordered - TDAP/TD VACCINES (1 - Tdap) Ordered on 1/23/2024      No completion, postpone, or frequency change history exists for this topic.              Ordered - LUNG CANCER SCREENING (Yearly) Ordered on 1/23/2024 01/01/2014  CT ANGIOGRAM CHEST WITH AND WITHOUT CONTRAST              Ordered - HEPATITIS C SCREENING (Once) Ordered on 1/23/2024      No completion, postpone, or frequency change  "history exists for this topic.              Postponed - ZOSTER VACCINE (1 of 2) Postponed until 1/23/2024 01/23/2024  Postponed until 1/23/2024 by Alicia Joya MD (Patient Refused)              Postponed - BMI FOLLOWUP (Yearly) Postponed until 1/24/2024 01/23/2024  Postponed until 1/24/2024 by Alicia Joya MD (Pending event)              Postponed - INFLUENZA VACCINE (Yearly - August to March) Postponed until 3/31/2024      01/23/2024  Postponed until 3/31/2024 by Alicia Joya MD (Product Unavailable)              Postponed - COVID-19 Vaccine (4 - 2023-24 season) Postponed until 4/13/2024 01/23/2024  Postponed until 4/13/2024 by Alicia Joya MD (Product Unavailable)    12/10/2021  Imm Admin: COVID-19 (PFIZER) Purple Cap Monovalent    04/23/2021  Imm Admin: COVID-19 (PFIZER) Purple Cap Monovalent    04/02/2021  Imm Admin: COVID-19 (PFIZER) Purple Cap Monovalent              ANNUAL PHYSICAL (Yearly) Next due on 1/23/2025 01/23/2024  Done              Pneumococcal Vaccine 0-64 (Series Information) Aged Out      01/23/2024  Postponed until 1/23/2024 by Alicia Joya MD (Pending event)                       Objective   Vitals:    01/23/24 0944   BP: 120/68   Pulse: 76   SpO2: 98%   Weight: 78.9 kg (174 lb)   Height: 175.3 cm (69.02\")            Physical Exam  Vitals reviewed.   Constitutional:       General: He is not in acute distress.     Appearance: Normal appearance.   HENT:      Head: Normocephalic and atraumatic.      Right Ear: External ear normal. There is impacted cerumen.      Left Ear: External ear normal. There is impacted cerumen.      Nose: Nose normal. No rhinorrhea.      Mouth/Throat:      Mouth: Mucous membranes are moist.      Pharynx: No posterior oropharyngeal erythema.      Comments: Tongue with brown discoloration sides and anterior  Eyes:      Extraocular Movements: Extraocular movements intact.      Conjunctiva/sclera: Conjunctivae normal.      Pupils: Pupils are " equal, round, and reactive to light.   Neck:      Comments: No thyromegaly or thyroid nodule on palpation  Cardiovascular:      Rate and Rhythm: Normal rate and regular rhythm.      Pulses: Normal pulses.      Heart sounds: Murmur heard.   Pulmonary:      Effort: Pulmonary effort is normal.      Breath sounds: Normal breath sounds. No wheezing.   Abdominal:      General: Bowel sounds are normal. There is no distension.      Palpations: Abdomen is soft.      Tenderness: There is no abdominal tenderness.      Comments: RLQ near ASIS nontender roughly 4-5cm subcutaneous mass, + protrusion with Valsalva   Musculoskeletal:         General: No tenderness or deformity. Normal range of motion.      Cervical back: Normal range of motion and neck supple.   Lymphadenopathy:      Cervical: Cervical adenopathy (nontender roughly 2-3cm soft tissue mass near angle of R mandible) present.   Skin:     General: Skin is warm and dry.      Capillary Refill: Capillary refill takes less than 2 seconds.      Findings: No lesion or rash.   Neurological:      General: No focal deficit present.      Mental Status: He is alert and oriented to person, place, and time.      Sensory: Sensory deficit (sharp/dull intact; soft touch present but different RLQ and R groin) present.      Gait: Gait normal.      Deep Tendon Reflexes: Reflexes normal.   Psychiatric:         Mood and Affect: Mood normal.         Behavior: Behavior normal.         Judgment: Judgment normal.        Ear Cerumen Removal    Date/Time: 1/23/2024 10:59 AM    Performed by: Alicia Joya MD  Authorized by: Alicia Joya MD    Anesthesia:  Local Anesthetic: none  Location details: left ear and right ear  Patient tolerance: patient tolerated the procedure well with no immediate complications  Procedure type: instrumentation, curette   Sedation:  Patient sedated: no        PHQ-9 Depression Screening  Little interest or pleasure in doing things? 0-->not at all   Feeling down,  depressed, or hopeless? 0-->not at all   Trouble falling or staying asleep, or sleeping too much?     Feeling tired or having little energy?     Poor appetite or overeating?     Feeling bad about yourself - or that you are a failure or have let yourself or your family down?     Trouble concentrating on things, such as reading the newspaper or watching television?     Moving or speaking so slowly that other people could have noticed? Or the opposite - being so fidgety or restless that you have been moving around a lot more than usual?     Thoughts that you would be better off dead, or of hurting yourself in some way?     PHQ-9 Total Score 0   If you checked off any problems, how difficult have these problems made it for you to do your work, take care of things at home, or get along with other people?       The following data was reviewed by: Alicia Joya MD on 01/23/2024:  INR          9/25/2023    08:20 10/11/2023    08:13 10/25/2023    08:20 11/9/2023    08:10 12/7/2023    08:26 1/4/2024    08:19   Common Labsle   INR 4.4  4.0  3.0  3.3  3.0  4.3      Consultant notes anticoagulation clinic notes      Assessment and Plan  Stew Cabral is a 63 y.o. male presents to [facility] today to establish care, chart reviewed and updated, medications reviewed, labs reviewed, preventative med reviewed. Patient has not been seen by primary care for annual exam in the last 12 months.. Answered all questions at this time, pt expressed no further concerns today.     Preventative medicine:   Eye exam: Not up to date.  Encouraged annual optometry exam   Dental exam: Up to date.    BP: normal  Lipid Panel :   ordered    A1c:  ordered    Hep C screening: ordered  HIV Screen UTD - N/A  STI screening UTD: N/A  Colon cancer screening UTD: shared decision making; ordered cologuard  Lung cancer screening UTD: ordered  Immunizations UTD: No- due flu, COVID, shingles  Anxiety/depression screening:  normal  Tobacco cessation counseling: N/A- encouraged continued tobacco cessation    Men:   Aortic aneurysm screen UTD: ordered  PSA UTD:  N/A    Diagnoses and all orders for this visit:    1. Mass of soft tissue of abdomen (Primary)  Assessment & Plan:  Suspect hernia, will get u/s eval.   If u/s normal or inconclusive, will consider CT vs MRI, kaelyn given abnormal sensation    Orders:  -     US Abdomen Limited; Future  -     US Nonvascular Extremity Limited; Future    2. Mass of soft tissue of neck  Assessment & Plan:  LAD vs salivary gland; given hx of smoking will get u/s. If concerning features will get CT.     Orders:  -     US Head Neck Soft Tissue; Future    3. Bilateral impacted cerumen  Comments:  Removal per proc note. Provided ear care instructions in AVS.    4. Personal history of tobacco use, presenting hazards to health  Assessment & Plan:  Due low dose chest CT and AAA u/s, shared decision making. Encouraged continued tobacco cessation.     Orders:  -     Cancel: CT chest low dose wo; Future  -     CT chest low dose wo; Future  -     US AAA Screen Limited; Future    5. Encounter for screening for malignant neoplasm of colon  -     Cologuard - Stool, Per Rectum; Future    6. Need for hepatitis C screening test  -     Hepatitis C Antibody    7. Due for screening  -     CBC & Differential  -     Comprehensive Metabolic Panel  -     Lipid Panel  -     Hemoglobin A1c    8. Annual physical exam    9. Encounter for immunization  -     Pneumococcal Conjugate Vaccine 20-Valent All  -     Tdap Vaccine Greater Than or Equal To 8yo IM    Other orders  -     Ear Cerumen Removal        Patient voiced understanding and agreement with plan of care and had no further questions or concerns at this time.     I spent 55 minutes on this encounter, including chart review of any relevant previous encounters, labs, and imaging;  >%50% of encounter spent face-to-face with the patient or coordinating care.    Alicia Joya,  MD  Family Medicine  [facility]    Follow Up  Return in 3 months (on 4/23/2024), or if symptoms worsen or fail to improve.    Patient Instructions   Today: Update screening labs and vaccinations except for shingles. Cologuard kit will come to your house, please complete it ASAP. See the eye doctor and the dentist annually.     Meds: None    Referrals: None    Imaging: Low dose screening chest CT, abdominal aorta screening; R neck, RLQ abdomen and R groin hernia ultrasound. If the ultrasound is not helpful or need more info, we will consider a CT or MRI. You should receive a call within 1 week to schedule the appointment.  If you do not hear anything please let us know.      Healthy lifestyle tips  - Reduce intake of processed food (shop perimeter of grocery store), especially white flour and sugar  - Increase intake of fruits/veggies  - Drink 32-64oz of water a day  - Quit smoking if you smoke  - Drink no more than 2 alcoholic beverages/day if you are male, no more than 1 alcoholic beverage/day if you are female  - Get 6-8 hours of restful sleep at night- poor sleep quality has been linked to increased risk of cardiovascular disease  - Voluntary physical activity cumulative 120-150 minutes/week  - Stress management utilizing meditation and mindfulness ([employer], free corby)  - Keep blood pressure < 140/90    Heart healthy diet from AHA: https://www.heart.org/en/healthy-living/healthy-eating/eat-smart/nutrition-basics/aha-diet-and-lifestyle-recommendations    Earwax plan:   Earwax is a normal part of the body and helps protect the delicate eardrum, but it becomes a problem when it builds up and gets stuck. You can help keep the earwax soft by flushing your ear canals 1-2x weekly in the shower with a 5mL syringe that is half hydrogen peroxide and half warm water.     Do not put qtips or other devices in the ears, as this will only push the earwax deeper into the ear canal.           Please review  the decision aid used during our discussion regarding the Low dose lung cancer screening visit today.                              Low-Dose Lung Cancer CT Screening Visit    CHIEF COMPLAINT:    Shared Decision Making  I am discussing tobacco cessation today with Stew Cabral    SMOKING HISTORY:     Social History     Tobacco Use   Smoking Status Former    Packs/day: 1.00    Years: 30.00    Additional pack years: 0.00    Total pack years: 30.00    Types: Cigarettes    Start date: 1993    Quit date: 2023    Years since quittin.2   Smokeless Tobacco Never   Tobacco Comments    Caffiene daily

## 2024-01-23 NOTE — ASSESSMENT & PLAN NOTE
Suspect hernia, will get u/s eval.   If u/s normal or inconclusive, will consider CT vs MRI, kaelyn given abnormal sensation

## 2024-01-23 NOTE — ASSESSMENT & PLAN NOTE
Due low dose chest CT and AAA u/s, shared decision making. Encouraged continued tobacco cessation.

## 2024-01-23 NOTE — PATIENT INSTRUCTIONS
Today: Update screening labs and vaccinations except for shingles. Cologuard kit will come to your house, please complete it ASAP. See the eye doctor and the dentist annually.     Meds: None    Referrals: None    Imaging: Low dose screening chest CT, abdominal aorta screening; R neck, RLQ abdomen and R groin hernia ultrasound. If the ultrasound is not helpful or need more info, we will consider a CT or MRI. You should receive a call within 1 week to schedule the appointment.  If you do not hear anything please let us know.      Healthy lifestyle tips  - Reduce intake of processed food (shop perimeter of grocery store), especially white flour and sugar  - Increase intake of fruits/veggies  - Drink 32-64oz of water a day  - Quit smoking if you smoke  - Drink no more than 2 alcoholic beverages/day if you are male, no more than 1 alcoholic beverage/day if you are female  - Get 6-8 hours of restful sleep at night- poor sleep quality has been linked to increased risk of cardiovascular disease  - Voluntary physical activity cumulative 120-150 minutes/week  - Stress management utilizing meditation and mindfulness ([employer], free corby)  - Keep blood pressure < 140/90    Heart healthy diet from AHA: https://www.heart.org/en/healthy-living/healthy-eating/eat-smart/nutrition-basics/aha-diet-and-lifestyle-recommendations    Earwax plan:   Earwax is a normal part of the body and helps protect the delicate eardrum, but it becomes a problem when it builds up and gets stuck. You can help keep the earwax soft by flushing your ear canals 1-2x weekly in the shower with a 5mL syringe that is half hydrogen peroxide and half warm water.     Do not put qtips or other devices in the ears, as this will only push the earwax deeper into the ear canal.           Please review the decision aid used during our discussion regarding the Low dose lung cancer screening visit today.

## 2024-01-24 LAB
ALBUMIN SERPL-MCNC: 4.4 G/DL (ref 3.9–4.9)
ALBUMIN/GLOB SERPL: 1.9 {RATIO} (ref 1.2–2.2)
ALP SERPL-CCNC: 97 IU/L (ref 44–121)
ALT SERPL-CCNC: 18 IU/L (ref 0–44)
AST SERPL-CCNC: 21 IU/L (ref 0–40)
BASOPHILS # BLD AUTO: 0.1 X10E3/UL (ref 0–0.2)
BASOPHILS NFR BLD AUTO: 1 %
BILIRUB SERPL-MCNC: 0.3 MG/DL (ref 0–1.2)
BUN SERPL-MCNC: 15 MG/DL (ref 8–27)
BUN/CREAT SERPL: 15 (ref 10–24)
CALCIUM SERPL-MCNC: 9.1 MG/DL (ref 8.6–10.2)
CHLORIDE SERPL-SCNC: 105 MMOL/L (ref 96–106)
CHOLEST SERPL-MCNC: 220 MG/DL (ref 100–199)
CO2 SERPL-SCNC: 22 MMOL/L (ref 20–29)
CREAT SERPL-MCNC: 1.03 MG/DL (ref 0.76–1.27)
EGFRCR SERPLBLD CKD-EPI 2021: 82 ML/MIN/1.73
EOSINOPHIL # BLD AUTO: 0.3 X10E3/UL (ref 0–0.4)
EOSINOPHIL NFR BLD AUTO: 3 %
ERYTHROCYTE [DISTWIDTH] IN BLOOD BY AUTOMATED COUNT: 13.1 % (ref 11.6–15.4)
GLOBULIN SER CALC-MCNC: 2.3 G/DL (ref 1.5–4.5)
GLUCOSE SERPL-MCNC: NORMAL MG/DL
HBA1C MFR BLD: 6.4 % (ref 4.8–5.6)
HCT VFR BLD AUTO: 49.5 % (ref 37.5–51)
HCV IGG SERPL QL IA: NON REACTIVE
HDLC SERPL-MCNC: 34 MG/DL
HGB BLD-MCNC: 16.3 G/DL (ref 13–17.7)
IMM GRANULOCYTES # BLD AUTO: 0 X10E3/UL (ref 0–0.1)
IMM GRANULOCYTES NFR BLD AUTO: 0 %
LDLC SERPL CALC-MCNC: 115 MG/DL (ref 0–99)
LYMPHOCYTES # BLD AUTO: 2.6 X10E3/UL (ref 0.7–3.1)
LYMPHOCYTES NFR BLD AUTO: 31 %
MCH RBC QN AUTO: 30.1 PG (ref 26.6–33)
MCHC RBC AUTO-ENTMCNC: 32.9 G/DL (ref 31.5–35.7)
MCV RBC AUTO: 92 FL (ref 79–97)
MONOCYTES # BLD AUTO: 0.6 X10E3/UL (ref 0.1–0.9)
MONOCYTES NFR BLD AUTO: 7 %
NEUTROPHILS # BLD AUTO: 4.6 X10E3/UL (ref 1.4–7)
NEUTROPHILS NFR BLD AUTO: 58 %
PLATELET # BLD AUTO: 256 X10E3/UL (ref 150–450)
POTASSIUM SERPL-SCNC: NORMAL MMOL/L
PROT SERPL-MCNC: 6.7 G/DL (ref 6–8.5)
RBC # BLD AUTO: 5.41 X10E6/UL (ref 4.14–5.8)
SODIUM SERPL-SCNC: 143 MMOL/L (ref 134–144)
TRIGL SERPL-MCNC: 407 MG/DL (ref 0–149)
VLDLC SERPL CALC-MCNC: 71 MG/DL (ref 5–40)
WBC # BLD AUTO: 8.2 X10E3/UL (ref 3.4–10.8)

## 2024-01-25 DIAGNOSIS — R73.03 PREDIABETES: ICD-10-CM

## 2024-01-25 DIAGNOSIS — E78.2 MIXED HYPERLIPIDEMIA: Primary | ICD-10-CM

## 2024-01-25 RX ORDER — METFORMIN HYDROCHLORIDE 500 MG/1
1000 TABLET, EXTENDED RELEASE ORAL
Qty: 90 TABLET | Refills: 3 | Status: SHIPPED | OUTPATIENT
Start: 2024-01-25

## 2024-01-25 RX ORDER — ROSUVASTATIN CALCIUM 20 MG/1
20 TABLET, COATED ORAL NIGHTLY
Qty: 90 TABLET | Refills: 3 | Status: SHIPPED | OUTPATIENT
Start: 2024-01-25

## 2024-01-25 NOTE — PROGRESS NOTES
Please call with the following information. Thank you!    Hello!    Here are the results of your most recent labs:    Your Hep C antibody, CBC, and Comprehensive Metabolic Panel was all normal.     Your Cholesterol and Hgb A1C was abnormal.     Your cholesterol was elevated.  For diet and lifestyle intervention, I recommend decreasing intake of trans and saturated fats, and red meat.  Increase physical activity as tolerated.  You may try supplementing with omega-3 1-2g daily, berberine 500mg daily, and/or whole flaxseed if desired.    ASCVD risk: The 10-year ASCVD risk score (Emily NICHOLS, et al., 2019) is: 12.6%    Values used to calculate the score:      Age: 63 years      Sex: Male      Is Non- : No      Diabetic: No      Tobacco smoker: No      Systolic Blood Pressure: 120 mmHg      Is BP treated: No      HDL Cholesterol: 34 mg/dL      Total Cholesterol: 220 mg/dL    At this time a statin is recommended to reduce your risk of heart attack and stroke. I recommend starting it now while you work on diet and exercise and we will try to get off it, the med is ordered.    Your A1c was in the prediabetic range at 6.4; if it goes over 6.5 you will have type 2 diabetes. I recommend decreasing carbohydrate intake to 150-200 g/day, reducing processed food, increasing fruit and vegetable intake, at least 120 minutes of physical activity per week as tolerated, and controlling blood pressure with a goal of less than 120/80. I ordered a medication called Metformin to help control your blood sugar and hopefully prevent you from developing type 2 diabetes.    Repeat labs in 3 months at least 1 week before your next appt, walk-in at your convenience ordered for our [facility] facility ([address] [facility], [place] [address]) by the clinic- please be fasting (no food after midnight, water/meds/black coffee ok). I recommend setting a reminder on your phone.    Please continue your current medications.  Please  contact me with any questions.    Thank you!  Dr. Joya

## 2024-01-30 ENCOUNTER — TELEPHONE (OUTPATIENT)
Dept: FAMILY MEDICINE CLINIC | Facility: CLINIC | Age: 64
End: 2024-01-30
Payer: COMMERCIAL

## 2024-01-30 NOTE — TELEPHONE ENCOUNTER
Hub staff attempted to follow warm transfer process and was unsuccessful     Caller: Stew Cabral    Relationship to patient: Self    Best call back number: [phone]    Patient is needing:PATIENT IS RETURNING A CALL REGARDING LAB RESULTS

## 2024-02-02 ENCOUNTER — TELEPHONE (OUTPATIENT)
Dept: PHARMACY | Facility: HOSPITAL | Age: 64
End: 2024-02-02
Payer: COMMERCIAL

## 2024-02-23 ENCOUNTER — HOSPITAL ENCOUNTER (OUTPATIENT)
Dept: CT IMAGING | Facility: HOSPITAL | Age: 64
Discharge: HOME | End: 2024-02-23
Payer: COMMERCIAL

## 2024-02-23 ENCOUNTER — HOSPITAL ENCOUNTER (OUTPATIENT)
Dept: ULTRASOUND IMAGING | Facility: HOSPITAL | Age: 64
Discharge: HOME | End: 2024-02-23
Payer: COMMERCIAL

## 2024-02-23 DIAGNOSIS — R19.00 MASS OF SOFT TISSUE OF ABDOMEN: ICD-10-CM

## 2024-02-23 DIAGNOSIS — M79.89 MASS OF SOFT TISSUE OF NECK: ICD-10-CM

## 2024-02-23 DIAGNOSIS — Z87.891 PERSONAL HISTORY OF TOBACCO USE, PRESENTING HAZARDS TO HEALTH: ICD-10-CM

## 2024-02-23 PROCEDURE — 76706 US ABDL AORTA SCREEN AAA: CPT

## 2024-02-23 PROCEDURE — 76536 US EXAM OF HEAD AND NECK: CPT

## 2024-02-23 PROCEDURE — 76882 US LMTD JT/FCL EVL NVASC XTR: CPT

## 2024-02-23 PROCEDURE — 71271 CT THORAX LUNG CANCER SCR C-: CPT

## 2024-02-26 ENCOUNTER — TELEPHONE (OUTPATIENT)
Dept: PHARMACY | Facility: HOSPITAL | Age: 64
End: 2024-02-26
Payer: COMMERCIAL

## 2024-02-28 PROBLEM — I77.810 ASCENDING AORTA DILATATION: Status: ACTIVE | Noted: 2024-02-28

## 2024-02-29 ENCOUNTER — ANTICOAGULATION VISIT (OUTPATIENT)
Dept: PHARMACY | Facility: HOSPITAL | Age: 64
End: 2024-02-29
Payer: COMMERCIAL

## 2024-02-29 DIAGNOSIS — Z95.4 HISTORY OF AORTIC VALVE REPLACEMENT WITH METALLIC VALVE: Primary | ICD-10-CM

## 2024-02-29 LAB
INR PPP: 2.7 (ref 0.91–1.09)
PROTHROMBIN TIME: 31.9 SECONDS (ref 10–13.8)

## 2024-02-29 PROCEDURE — G0463 HOSPITAL OUTPT CLINIC VISIT: HCPCS

## 2024-02-29 PROCEDURE — 36416 COLLJ CAPILLARY BLOOD SPEC: CPT

## 2024-02-29 PROCEDURE — 85610 PROTHROMBIN TIME: CPT

## 2024-02-29 NOTE — PROGRESS NOTES
I have supervised and reviewed the notes, assessments, and/or procedures performed. The documented assessment and plan were developed cooperatively, and the plan was implemented in my presence. I concur with the documentation of this patient encounter.    Michelle Narvaez, ChaparroD

## 2024-02-29 NOTE — PROGRESS NOTES
Anticoagulation Clinic Progress Note    Anticoagulation Summary  As of 2024      INR goal:  2.5-3.5   TTR:  42.3% (5.4 y)   INR used for dosin.7 (2024)   Warfarin maintenance plan:  3.75 mg every Sun, Thu; 7.5 mg all other days   Weekly warfarin total:  45 mg   No change documented:  Steve Cheng, Pharmacy Intern   Plan last modified:  Merlyn Hassan RPH (2023)   Next INR check:  3/15/2024   Priority:  Maintenance   Target end date:  Indefinite    Indications    History of aortic valve replacement with metallic valve [Z95.4]                 Anticoagulation Episode Summary       INR check location:      Preferred lab:      Send INR reminders to:  MARIANNE BILLS [facility]    Comments:            Anticoagulation Care Providers       Provider Role Specialty Phone number    Kitty Lagunas MD Referring Cardiology [phone]            Clinic Interview:  Patient Findings     Positives:  Change in medications    Negatives:  Signs/symptoms of thrombosis, Signs/symptoms of bleeding,   Laboratory test error suspected, Change in health, Change in alcohol use,   Change in activity, Upcoming invasive procedure, Emergency department   visit, Upcoming dental procedure, Missed doses, Extra doses, Change in   diet/appetite, Hospital admission, Bruising, Other complaints    Comments:  Patient reports starting on 3 new medications but is unsure   what they are.      Clinical Outcomes     Negatives:  Major bleeding event, Thromboembolic event,   Anticoagulation-related hospital admission, Anticoagulation-related ED   visit, Anticoagulation-related fatality    Comments:  Patient reports starting on 3 new medications but is unsure   what they are.        INR History:      2023     8:15 AM 10/11/2023     8:15 AM 10/25/2023     8:15 AM 2023     8:00 AM 2023     8:00 AM 2024     8:15 AM 2024     8:15 AM   Anticoagulation Monitoring   INR 4.4 4.0 3.0 3.3 3.0 4.3 2.7   INR Date  9/25/2023 10/11/2023 10/25/2023 11/9/2023 12/7/2023 1/4/2024 2/29/2024   INR Goal 2.5-3.5 2.5-3.5 2.5-3.5 2.5-3.5 2.5-3.5 2.5-3.5 2.5-3.5   Trend Down Same Same Same Same Same Same   Last Week Total 48.75 mg 48.75 mg 45 mg 45 mg 45 mg 43.75 mg 45 mg   Next Week Total 37.5 mg 41.25 mg 45 mg 45 mg 45 mg 41.25 mg 45 mg   Sun 3.75 mg 3.75 mg 3.75 mg 3.75 mg 3.75 mg 3.75 mg 3.75 mg   Mon Hold (9/25); Otherwise 7.5 mg 7.5 mg 7.5 mg 7.5 mg 7.5 mg 7.5 mg 7.5 mg   Tue 7.5 mg 7.5 mg 7.5 mg 7.5 mg 7.5 mg 7.5 mg 7.5 mg   Wed 7.5 mg 3.75 mg (10/11); Otherwise 7.5 mg 7.5 mg 7.5 mg 7.5 mg 7.5 mg 7.5 mg   Thu 3.75 mg 3.75 mg 3.75 mg 3.75 mg 3.75 mg Hold (1/4); Otherwise 3.75 mg 3.75 mg   Fri 7.5 mg 7.5 mg 7.5 mg 7.5 mg 7.5 mg 7.5 mg 7.5 mg   Sat 7.5 mg 7.5 mg 7.5 mg 7.5 mg 7.5 mg 7.5 mg 7.5 mg       Plan:  1. INR is therapeutic today- see above in Anticoagulation Summary.   Will instruct Stew Cabral to continue their warfarin regimen- see above in Anticoagulation Summary.  2. Follow up in 2 weeks.  3. Patient declines warfarin refills.  4. Verbal and written information provided. Patient expresses understanding and has no further questions at this time.  5. Patient was reminded about follow up with [facility]; his last visit was in 2022. Explained that we are not able to manage warfarin without ongoing care from [facility].  He verbalized willingness to schedule ASAP.     Steve Cheng, Pharmacy Intern

## 2024-03-01 DIAGNOSIS — R19.5 POSITIVE COLORECTAL CANCER SCREENING USING COLOGUARD TEST: Primary | ICD-10-CM

## 2024-03-01 DIAGNOSIS — Z12.11 ENCOUNTER FOR SCREENING FOR MALIGNANT NEOPLASM OF COLON: ICD-10-CM

## 2024-03-15 ENCOUNTER — ANTICOAGULATION VISIT (OUTPATIENT)
Dept: PHARMACY | Facility: HOSPITAL | Age: 64
End: 2024-03-15
Payer: COMMERCIAL

## 2024-03-15 DIAGNOSIS — Z95.4 HISTORY OF AORTIC VALVE REPLACEMENT WITH METALLIC VALVE: Primary | ICD-10-CM

## 2024-03-15 LAB
INR PPP: 4.7 (ref 0.91–1.09)
PROTHROMBIN TIME: 56.9 SECONDS (ref 10–13.8)

## 2024-03-15 PROCEDURE — G0463 HOSPITAL OUTPT CLINIC VISIT: HCPCS

## 2024-03-15 PROCEDURE — 85610 PROTHROMBIN TIME: CPT

## 2024-03-15 PROCEDURE — 36416 COLLJ CAPILLARY BLOOD SPEC: CPT

## 2024-03-15 NOTE — PROGRESS NOTES
I have supervised and reviewed the notes, assessments, and/or procedures performed. The documented assessment and plan were developed cooperatively, and the plan was implemented in my presence. I concur with the documentation of this patient encounter. Sooner appointment was not available at his requested time (8 - 8:15 am). He was instructed to visit the Cardiology office or call Cardiology to schedule appointment, as he is overdue for appointment.    Gael Turner, PharmD

## 2024-03-15 NOTE — PROGRESS NOTES
Anticoagulation Clinic Progress Note    Anticoagulation Summary  As of 3/15/2024      INR goal:  2.5-3.5   TTR:  42.3% (5.5 y)   INR used for dosin.7 (3/15/2024)   Warfarin maintenance plan:  3.75 mg every Sun, Tue, Thu; 7.5 mg all other days   Weekly warfarin total:  41.25 mg   Plan last modified:  Steve Cheng, Pharmacy Intern (3/15/2024)   Next INR check:  3/25/2024   Priority:  Maintenance   Target end date:  Indefinite    Indications    History of aortic valve replacement with metallic valve [Z95.4]                 Anticoagulation Episode Summary       INR check location:      Preferred lab:      Send INR reminders to:  [facility]    Comments:            Anticoagulation Care Providers       Provider Role Specialty Phone number    Kitty Lagunas MD Referring Cardiology [phone]            Clinic Interview:  Patient Findings     Positives:  Change in medications, Other complaints    Negatives:  Signs/symptoms of thrombosis, Signs/symptoms of bleeding,   Laboratory test error suspected, Change in health, Change in alcohol use,   Change in activity, Upcoming invasive procedure, Emergency department   visit, Upcoming dental procedure, Missed doses, Extra doses, Change in   diet/appetite, Hospital admission, Bruising    Comments:  Patient confirmed starting on metformin and rosuvastatin 2-3   weeks ago. Patient also reports that there is a third new medication that   he hasn't started yet but will soon. Patient reports metformin induced   diarrhea.      Clinical Outcomes     Negatives:  Major bleeding event, Thromboembolic event,   Anticoagulation-related hospital admission, Anticoagulation-related ED   visit, Anticoagulation-related fatality    Comments:  Patient confirmed starting on metformin and rosuvastatin 2-3   weeks ago. Patient also reports that there is a third new medication that   he hasn't started yet but will soon. Patient reports metformin induced   diarrhea.        INR  History:      10/11/2023     8:15 AM 10/25/2023     8:15 AM 11/9/2023     8:00 AM 12/7/2023     8:00 AM 1/4/2024     8:15 AM 2/29/2024     8:15 AM 3/15/2024     8:00 AM   Anticoagulation Monitoring   INR 4.0 3.0 3.3 3.0 4.3 2.7 4.7   INR Date 10/11/2023 10/25/2023 11/9/2023 12/7/2023 1/4/2024 2/29/2024 3/15/2024   INR Goal 2.5-3.5 2.5-3.5 2.5-3.5 2.5-3.5 2.5-3.5 2.5-3.5 2.5-3.5   Trend Same Same Same Same Same Same Down   Last Week Total 48.75 mg 45 mg 45 mg 45 mg 43.75 mg 45 mg 45 mg   Next Week Total 41.25 mg 45 mg 45 mg 45 mg 41.25 mg 45 mg 33.75 mg   Sun 3.75 mg 3.75 mg 3.75 mg 3.75 mg 3.75 mg 3.75 mg 3.75 mg   Mon 7.5 mg 7.5 mg 7.5 mg 7.5 mg 7.5 mg 7.5 mg 7.5 mg   Tue 7.5 mg 7.5 mg 7.5 mg 7.5 mg 7.5 mg 7.5 mg 3.75 mg   Wed 3.75 mg (10/11); Otherwise 7.5 mg 7.5 mg 7.5 mg 7.5 mg 7.5 mg 7.5 mg 7.5 mg   Thu 3.75 mg 3.75 mg 3.75 mg 3.75 mg Hold (1/4); Otherwise 3.75 mg 3.75 mg 3.75 mg   Fri 7.5 mg 7.5 mg 7.5 mg 7.5 mg 7.5 mg 7.5 mg Hold (3/15); Otherwise 7.5 mg   Sat 7.5 mg 7.5 mg 7.5 mg 7.5 mg 7.5 mg 7.5 mg 7.5 mg       Plan:  1. INR is Supratherapeutic today- see above in Anticoagulation Summary.  Will instruct Stew Cabral to Change their warfarin regimen- see above in Anticoagulation Summary. Hold today then reduce to 3.75mg sun tues thurs, 7.5mg AOD  2. Follow up in 1.5 weeks  3. Patient declines warfarin refills.  4. Verbal and written information provided. Patient expresses understanding and has no further questions at this time.    Steve Cheng, Pharmacy Intern

## 2024-03-19 ENCOUNTER — OFFICE VISIT (OUTPATIENT)
Dept: SURGERY | Facility: CLINIC | Age: 64
End: 2024-03-19
Payer: COMMERCIAL

## 2024-03-19 VITALS
OXYGEN SATURATION: 98 % | DIASTOLIC BLOOD PRESSURE: 60 MMHG | SYSTOLIC BLOOD PRESSURE: 100 MMHG | WEIGHT: 173.1 LBS | BODY MASS INDEX: 25.64 KG/M2 | HEIGHT: 69 IN | HEART RATE: 83 BPM

## 2024-03-19 DIAGNOSIS — R19.5 POSITIVE COLORECTAL CANCER SCREENING USING COLOGUARD TEST: Primary | ICD-10-CM

## 2024-03-19 PROCEDURE — 99204 OFFICE O/P NEW MOD 45 MIN: CPT | Performed by: PHYSICIAN ASSISTANT

## 2024-03-19 RX ORDER — SODIUM CHLORIDE, SODIUM LACTATE, POTASSIUM CHLORIDE, CALCIUM CHLORIDE 600; 310; 30; 20 MG/100ML; MG/100ML; MG/100ML; MG/100ML
30 INJECTION, SOLUTION INTRAVENOUS CONTINUOUS
OUTPATIENT
Start: 2024-03-19

## 2024-03-19 NOTE — PROGRESS NOTES
Stew Cabral is a 63 y.o. male who is seen as a consult at the request of Alicia oJya MD for Rectal Bleeding.      HPI:  Pt presents today for evaluation after receiving a positive Cologuard result 02/2024.   He states this was the first time testing with Cologuard.  No previous colonoscopy.   No known FHx of colon polyps, colon cancer, or IBD.     Pt typically has 2 BM daily.  [place]: 3-6  No straining  Not taking any fiber, stool softeners, or laxatives.   No RB, melena, or changes in bowel habits.    Pt states he developed numbness in his right groin and right abdomen 6 months ago.   Resolved a few weeks ago.  Denies any abdominal pain.   No changes in weight, appetite, weight, or energy levels.      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 His smoking use included cigarettes. He started smoking about 31 years ago. He has a 30.8 pack-year smoking history. He has been exposed to tobacco smoke. He has never used smokeless tobacco. He reports current alcohol use. He reports current drug use. Drug: Marijuana.      Marriage status: Single    Family History   Problem Relation Age of Onset    Hypertension Father     Cancer Maternal Grandfather     Lung cancer Paternal Grandmother         smoker         Current Outpatient Medications:     metFORMIN ER (GLUCOPHAGE-XR) 500 MG 24 hr tablet, Take 2 tablets by mouth Daily With Breakfast., Disp: 90 tablet, Rfl: 3    rosuvastatin (Crestor) 20 MG tablet, Take 1 tablet by mouth Every Night.  Indications: High Amount of Fats in the Blood, High Amount of Triglycerides in the Blood, Disp: 90 tablet, Rfl: 3    Vyvanse 50 MG capsule, TK 1 C QAM, Disp: , Rfl:     warfarin (COUMADIN) 7.5 MG tablet, Take one-half of a tablet (3.75 mg) by mouth on Sun/Thurs and take one tablet (7.5 mg) by mouth all other days or as directed, Disp: 85 tablet, Rfl: 1    Allergy  Patient has no known allergies.    Review of Systems   Constitutional: Negative for decreased appetite and weight gain.   HENT:  Negative for congestion, hearing loss and hoarse voice.    Eyes:  Negative for blurred vision, discharge and visual disturbance.   Cardiovascular:  Negative for chest pain, cyanosis and leg swelling.   Respiratory:  Negative for cough, shortness of breath, sleep disturbances due to breathing and snoring.    Endocrine: Negative for cold intolerance and heat intolerance.   Hematologic/Lymphatic: Does not bruise/bleed easily.   Skin:  Negative for itching, poor wound healing and skin cancer.   Musculoskeletal:  Negative for arthritis, back pain, joint pain and joint swelling.   Gastrointestinal:  Negative for abdominal pain, change in bowel habit, bowel incontinence and constipation.   Genitourinary:  Negative for bladder incontinence, dysuria and hematuria.   Neurological:  Negative for brief paralysis, excessive daytime sleepiness, dizziness, focal weakness, headaches, light-headedness and weakness.   Psychiatric/Behavioral:  Negative for altered mental status and hallucinations. The patient does not have insomnia.    Allergic/Immunologic: Negative for HIV exposure and persistent infections.   All other systems reviewed and are negative.      Vitals:    03/19/24 1317   BP: 100/60   Pulse: 83   SpO2: 98%     Body mass index is 25.56 kg/m².    Physical Exam  Exam conducted with a chaperone present.   Constitutional:       General: He is not in acute distress.     Appearance: He is well-developed.   HENT:      Head: Normocephalic and  atraumatic.      Nose: Nose normal.   Eyes:      Conjunctiva/sclera: Conjunctivae normal.      Pupils: Pupils are equal, round, and reactive to light.   Neck:      Trachea: No tracheal deviation.   Pulmonary:      Effort: Pulmonary effort is normal. No respiratory distress.      Breath sounds: Normal breath sounds.   Abdominal:      General: Bowel sounds are normal. There is no distension.      Palpations: Abdomen is soft.   Musculoskeletal:         General: No deformity. Normal range of motion.      Cervical back: Normal range of motion.   Skin:     General: Skin is warm and dry.   Neurological:      Mental Status: He is alert and oriented to person, place, and time.      Cranial Nerves: No cranial nerve deficit.      Coordination: Coordination normal.      Gait: Gait normal.   Psychiatric:         Behavior: Behavior normal.         Thought Content: Thought content normal.         Judgment: Judgment normal.         Review of Medical Record:  I reviewed medical records as detailed in HPI.     Assessment:  1. Positive colorectal cancer screening using Cologuard test    - New    Plan:  - Will schedule colonoscopy to assess for any colonic neoplasms  - Follow up based on results of colonoscopy

## 2024-03-29 ENCOUNTER — ANESTHESIA EVENT (OUTPATIENT)
Dept: GASTROENTEROLOGY | Facility: HOSPITAL | Age: 64
End: 2024-03-29
Payer: COMMERCIAL

## 2024-03-29 ENCOUNTER — HOSPITAL ENCOUNTER (OUTPATIENT)
Facility: HOSPITAL | Age: 64
Setting detail: HOSPITAL OUTPATIENT SURGERY
Discharge: HOME | End: 2024-03-29
Attending: COLON & RECTAL SURGERY | Admitting: COLON & RECTAL SURGERY
Payer: COMMERCIAL

## 2024-03-29 ENCOUNTER — ANESTHESIA (OUTPATIENT)
Dept: GASTROENTEROLOGY | Facility: HOSPITAL | Age: 64
End: 2024-03-29
Payer: COMMERCIAL

## 2024-03-29 VITALS
BODY MASS INDEX: 25.06 KG/M2 | HEIGHT: 69 IN | RESPIRATION RATE: 16 BRPM | HEART RATE: 67 BPM | SYSTOLIC BLOOD PRESSURE: 114 MMHG | TEMPERATURE: 98.5 F | DIASTOLIC BLOOD PRESSURE: 70 MMHG | WEIGHT: 169.2 LBS | OXYGEN SATURATION: 97 %

## 2024-03-29 DIAGNOSIS — R19.5 POSITIVE COLORECTAL CANCER SCREENING USING COLOGUARD TEST: ICD-10-CM

## 2024-03-29 LAB
GLUCOSE BLDC GLUCOMTR-MCNC: 88 MG/DL (ref 70–130)
INR PPP: 2.3 (ref 0.8–1.2)
INR PPP: 2.3 (ref 0.9–1.1)
PROTHROMBIN TIME: 26.1 SECONDS
PROTHROMBIN TIME: 26.1 SECONDS (ref 12.8–15.2)

## 2024-03-29 PROCEDURE — 25010000002 PROPOFOL 10 MG/ML EMULSION: Performed by: NURSE ANESTHETIST, CERTIFIED REGISTERED

## 2024-03-29 PROCEDURE — 88305 TISSUE EXAM BY PATHOLOGIST: CPT | Performed by: COLON & RECTAL SURGERY

## 2024-03-29 PROCEDURE — 25010000002 AMPICILLIN PER 500 MG: Performed by: COLON & RECTAL SURGERY

## 2024-03-29 PROCEDURE — 25810000003 LACTATED RINGERS PER 1000 ML: Performed by: PHYSICIAN ASSISTANT

## 2024-03-29 PROCEDURE — 25010000002 PHENYLEPHRINE 10 MG/ML SOLUTION: Performed by: NURSE ANESTHETIST, CERTIFIED REGISTERED

## 2024-03-29 PROCEDURE — 82948 REAGENT STRIP/BLOOD GLUCOSE: CPT

## 2024-03-29 PROCEDURE — 85610 PROTHROMBIN TIME: CPT

## 2024-03-29 DEVICE — DEV CLIP ENDO RESOLUTION360 CONTRL ROT 235CM: Type: IMPLANTABLE DEVICE | Site: CECUM | Status: FUNCTIONAL

## 2024-03-29 RX ORDER — LIDOCAINE HYDROCHLORIDE 20 MG/ML
INJECTION, SOLUTION INFILTRATION; PERINEURAL AS NEEDED
Status: DISCONTINUED | OUTPATIENT
Start: 2024-03-29 | End: 2024-03-29 | Stop reason: SURG

## 2024-03-29 RX ORDER — PHENYLEPHRINE HYDROCHLORIDE 10 MG/ML
INJECTION INTRAVENOUS AS NEEDED
Status: DISCONTINUED | OUTPATIENT
Start: 2024-03-29 | End: 2024-03-29 | Stop reason: SURG

## 2024-03-29 RX ORDER — SODIUM CHLORIDE, SODIUM LACTATE, POTASSIUM CHLORIDE, CALCIUM CHLORIDE 600; 310; 30; 20 MG/100ML; MG/100ML; MG/100ML; MG/100ML
30 INJECTION, SOLUTION INTRAVENOUS CONTINUOUS
Status: DISCONTINUED | OUTPATIENT
Start: 2024-03-29 | End: 2024-03-29 | Stop reason: HOSPADM

## 2024-03-29 RX ORDER — PROPOFOL 10 MG/ML
VIAL (ML) INTRAVENOUS CONTINUOUS PRN
Status: DISCONTINUED | OUTPATIENT
Start: 2024-03-29 | End: 2024-03-29 | Stop reason: SURG

## 2024-03-29 RX ADMIN — PHENYLEPHRINE HYDROCHLORIDE 200 MCG: 10 INJECTION INTRAVENOUS at 11:31

## 2024-03-29 RX ADMIN — PROPOFOL 180 MCG/KG/MIN: 10 INJECTION, EMULSION INTRAVENOUS at 11:15

## 2024-03-29 RX ADMIN — SODIUM CHLORIDE, POTASSIUM CHLORIDE, SODIUM LACTATE AND CALCIUM CHLORIDE 30 ML/HR: 600; 310; 30; 20 INJECTION, SOLUTION INTRAVENOUS at 10:52

## 2024-03-29 RX ADMIN — AMPICILLIN SODIUM 1 G: 1 INJECTION, POWDER, FOR SOLUTION INTRAMUSCULAR; INTRAVENOUS at 11:21

## 2024-03-29 RX ADMIN — LIDOCAINE HYDROCHLORIDE 60 MG: 20 INJECTION, SOLUTION INFILTRATION; PERINEURAL at 11:15

## 2024-03-29 NOTE — PROGRESS NOTES
Good news! Your colon cancer screening was significant for several polyps, pathology is pending. You are due repeat colorectal cancer screening in 3 years unless you notice any concerning symptoms such as blood in bowel movements, recurrent diarrhea/constipation, or abdominal pain with weight loss. Because you have colon polyps, your first degree relatives may be at increased risk for polyps and should ensure their cancer screening is up to date.     Thank you!    Dr. Joya

## 2024-03-29 NOTE — DISCHARGE INSTRUCTIONS
For the next 24 hours patient needs to be with a responsible adult.    For 24 hours DO NOT drive, operate machinery, appliances, drink alcohol, make important decisions or sign legal documents.    Start with a light or bland diet if you are feeling sick to your stomach otherwise advance to regular diet as tolerated.    Follow recommendations on procedure report if provided by your doctor.    Call Dr MOFFETT for problems [phone]    Problems may include but not limited to: large amounts of bleeding, trouble breathing, repeated vomiting, severe unrelieved pain, fever or chills.        REPEAT COLONOSCOPY IN 3 YEARS    CONTINUE TO TAKE COUMADIN

## 2024-03-29 NOTE — H&P
Stew Cabral is a 63 y.o. male  who is referred by Flaco Cabral MD for a colonoscopy. He   has an indications: rectal bleeding, +cologuard.     He denies any change in bowel function, melena, or hematochezia.    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 90 tablet 3 3/27/2024    rosuvastatin (Crestor) 20 MG tablet Take 1 tablet by mouth Every Night. Indications: High Amount of Fats in the Blood, High Amount of Triglycerides in the Blood 90 tablet 3 3/27/2024    Vyvanse 50 MG capsule TK 1 C QAM   Past Week    warfarin (COUMADIN) 7.5 MG tablet Take one-half of a tablet (3.75 mg) by mouth on Sun/Thurs and take one tablet (7.5 mg) by mouth all other days or as directed 85 tablet 1 3/27/2024       No Known Allergies    Family History   Problem Relation Age of Onset    Hypertension Father     Cancer Maternal Grandfather     Lung cancer Paternal Grandmother         smoker    Malig Hyperthermia Neg Hx        Social History     Socioeconomic History    Marital status: Single   Tobacco Use    Smoking status: Former     Current packs/day: 0.00     Average packs/day: 1 pack/day for 30.8 years (30.8 ttl pk-yrs)     Types: Cigarettes     Start date: 1993     Quit date: 2023     Years since quittin.4     Passive exposure: Past    Smokeless tobacco: Never    Tobacco comments:     Caffiene daily   Vaping Use    Vaping status: Never  Used   Substance and Sexual Activity    Alcohol use: Yes     Alcohol/week: 0.0 - 1.0 standard drinks of alcohol     Comment: Rarely    Drug use: Yes     Types: Marijuana     Comment: CBD gummy occasionally    Sexual activity: Yes     Partners: Female     Birth control/protection: None       Review of Systems   Gastrointestinal:  Negative for abdominal pain, nausea and vomiting.   All other systems reviewed and are negative.      Vitals:    03/29/24 1040   BP: 118/78   Pulse: 60   Resp: 18   SpO2: 100%         Physical Exam  Constitutional:       Appearance: He is well-developed.   HENT:      Head: Normocephalic and atraumatic.   Eyes:      Pupils: Pupils are equal, round, and reactive to light.   Cardiovascular:      Rate and Rhythm: Regular rhythm.   Pulmonary:      Effort: Pulmonary effort is normal.   Abdominal:      General: There is no distension.      Palpations: Abdomen is soft.   Musculoskeletal:         General: Normal range of motion.   Skin:     General: Skin is warm and dry.   Neurological:      Mental Status: He is alert and oriented to person, place, and time.   Psychiatric:         Thought Content: Thought content normal.         Judgment: Judgment normal.           Assessment & Plan      indications: rectal bleeding, +cologuard         I recommend colonoscopy.  I described risks, benefits of the procedure with the patient including but not limited to bleeding, infection, possibility of perforation and possible polypectomy. All of the patient's questions were answered and they would like to proceed with the above recommendations.

## 2024-03-29 NOTE — ANESTHESIA POSTPROCEDURE EVALUATION
Patient: Stew Cabral    Procedure Summary       Date: 03/29/24 Room / Location: [facility] ENDOSCOPY 6 / [facility] ENDOSCOPY    Anesthesia Start: 1112 Anesthesia Stop: 1141    Procedure: COLONOSCOPY INTO CECUM WITH HOT SNARE POLYPECTOMIES AND CLIP PLACEMENT X6 Diagnosis:       Positive colorectal cancer screening using Cologuard test      (Positive colorectal cancer screening using Cologuard test [R19.5])    Surgeons: Flaco Cabral MD Provider: Tanmay Armstrong MD    Anesthesia Type: MAC ASA Status: 3            Anesthesia Type: MAC    Vitals  Vitals Value Taken Time   /69 03/29/24 1146   Temp     Pulse 56 03/29/24 1147   Resp 16 03/29/24 1146   SpO2 96 % 03/29/24 1147   Vitals shown include unfiled device data.        Post Anesthesia Care and Evaluation    Patient location during evaluation: PACU  Patient participation: complete - patient participated  Level of consciousness: awake and alert  Pain management: adequate    Airway patency: patent  Anesthetic complications: No anesthetic complications    Cardiovascular status: acceptable  Respiratory status: acceptable  Hydration status: acceptable    Comments: --------------------            03/29/24               1146     --------------------   BP:       128/69     Pulse:      57       Resp:       16       SpO2:      96%      --------------------

## 2024-03-29 NOTE — ANESTHESIA PREPROCEDURE EVALUATION
Anesthesia Evaluation     Patient summary reviewed and Nursing notes reviewed                Airway   Mallampati: II  TM distance: >3 FB  Neck ROM: full  Dental      Pulmonary    (+) a smoker, COPD,  Cardiovascular     ECG reviewed  PT is on anticoagulation therapy  Rhythm: regular  Rate: normal    (+) valvular problems/murmurs, CABG, CHF       Neuro/Psych  (+) psychiatric history ADD  GI/Hepatic/Renal/Endo - negative ROS     Musculoskeletal (-) negative ROS    Abdominal    Substance History   (+) alcohol use     OB/GYN negative ob/gyn ROS         Other                      Anesthesia Plan    ASA 3     MAC     (S/P mechanical AVR on thinners (held))  intravenous induction     Anesthetic plan, risks, benefits, and alternatives have been provided, discussed and informed consent has been obtained with: patient.    CODE STATUS:

## 2024-04-01 ENCOUNTER — TELEPHONE (OUTPATIENT)
Dept: CARDIOLOGY | Facility: CLINIC | Age: 64
End: 2024-04-01

## 2024-04-01 LAB
LAB AP CASE REPORT: NORMAL
PATH REPORT.FINAL DX SPEC: NORMAL
PATH REPORT.GROSS SPEC: NORMAL

## 2024-04-01 NOTE — TELEPHONE ENCOUNTER
Caller: Stew Cabral    Relationship to patient: Self    Best call back number: [phone]    Chief complaint:     Type of visit: FOLLOW UP     Requested date: AS SOON AS POSSIBLE     If rescheduling, when is the original appointment: 02.16.23     Additional notes:PATIENT STATED THAT HE HAD MISSED AN APPOINTMENT WITH DR. DORANTES ON 02.16.24 FOR A YEARLY FOLLOW UP AND NEEDS TO RESCHEDULE THIS APPOINTMENT.

## 2024-04-10 RX ORDER — WARFARIN SODIUM 7.5 MG/1
TABLET ORAL
Qty: 85 TABLET | Refills: 1 | Status: SHIPPED | OUTPATIENT
Start: 2024-04-10

## 2024-04-18 ENCOUNTER — ANTICOAGULATION VISIT (OUTPATIENT)
Dept: PHARMACY | Facility: HOSPITAL | Age: 64
End: 2024-04-18
Payer: COMMERCIAL

## 2024-04-18 DIAGNOSIS — Z95.4 HISTORY OF AORTIC VALVE REPLACEMENT WITH METALLIC VALVE: Primary | ICD-10-CM

## 2024-04-18 LAB
INR PPP: 2 (ref 0.91–1.09)
PROTHROMBIN TIME: 24.3 SECONDS (ref 10–13.8)

## 2024-04-18 PROCEDURE — 85610 PROTHROMBIN TIME: CPT

## 2024-04-18 PROCEDURE — G0463 HOSPITAL OUTPT CLINIC VISIT: HCPCS

## 2024-04-18 PROCEDURE — 36416 COLLJ CAPILLARY BLOOD SPEC: CPT

## 2024-04-18 NOTE — PROGRESS NOTES
Anticoagulation Clinic Progress Note    Anticoagulation Summary  As of 2024      INR goal:  2.5-3.5   TTR:  41.9% (5.6 y)   INR used for dosin.0 (2024)   Warfarin maintenance plan:  3.75 mg every Sun, Thu; 7.5 mg all other days   Weekly warfarin total:  45 mg   Plan last modified:  Gael Turner, PharmD (2024)   Next INR check:  2024   Priority:  Maintenance   Target end date:  Indefinite    Indications    History of aortic valve replacement with metallic valve [Z95.4]                 Anticoagulation Episode Summary       INR check location:      Preferred lab:      Send INR reminders to:   [facility]    Comments:            Anticoagulation Care Providers       Provider Role Specialty Phone number    Kitty Lagunas MD Referring Cardiology [phone]            Clinic Interview:  Patient Findings     Positives:  Other complaints    Negatives:  Signs/symptoms of thrombosis, Signs/symptoms of bleeding,   Laboratory test error suspected, Change in health, Change in alcohol use,   Change in activity, Upcoming invasive procedure, Emergency department   visit, Upcoming dental procedure, Missed doses, Extra doses, Change in   medications, Change in diet/appetite, Hospital admission, Bruising    Comments:  Colonoscopy 3/29/24. He indicates he did not hold warfarin   prior to procedure, but he did hold warfarin the day of the procedure per   GI request.      Clinical Outcomes     Negatives:  Major bleeding event, Thromboembolic event,   Anticoagulation-related hospital admission, Anticoagulation-related ED   visit, Anticoagulation-related fatality    Comments:  Colonoscopy 3/29/24. He indicates he did not hold warfarin   prior to procedure, but he did hold warfarin the day of the procedure per   GI request.        INR History:      10/25/2023     8:15 AM 2023     8:00 AM 2023     8:00 AM 2024     8:15 AM 2024     8:15 AM 3/15/2024     8:00 AM 2024     8:45  AM   Anticoagulation Monitoring   INR 3.0 3.3 3.0 4.3 2.7 4.7 2.0   INR Date 10/25/2023 11/9/2023 12/7/2023 1/4/2024 2/29/2024 3/15/2024 4/18/2024   INR Goal 2.5-3.5 2.5-3.5 2.5-3.5 2.5-3.5 2.5-3.5 2.5-3.5 2.5-3.5   Trend Same Same Same Same Same Down Up   Last Week Total 45 mg 45 mg 45 mg 43.75 mg 45 mg 45 mg 41.25 mg   Next Week Total 45 mg 45 mg 45 mg 41.25 mg 45 mg 33.75 mg 48.75 mg   Sun 3.75 mg 3.75 mg 3.75 mg 3.75 mg 3.75 mg 3.75 mg 3.75 mg   Mon 7.5 mg 7.5 mg 7.5 mg 7.5 mg 7.5 mg 7.5 mg 7.5 mg   Tue 7.5 mg 7.5 mg 7.5 mg 7.5 mg 7.5 mg 3.75 mg 7.5 mg   Wed 7.5 mg 7.5 mg 7.5 mg 7.5 mg 7.5 mg 7.5 mg 7.5 mg   Thu 3.75 mg 3.75 mg 3.75 mg Hold (1/4); Otherwise 3.75 mg 3.75 mg 3.75 mg 7.5 mg (4/18); Otherwise 3.75 mg   Fri 7.5 mg 7.5 mg 7.5 mg 7.5 mg 7.5 mg Hold (3/15); Otherwise 7.5 mg 7.5 mg   Sat 7.5 mg 7.5 mg 7.5 mg 7.5 mg 7.5 mg 7.5 mg 7.5 mg       Plan:  1. INR is Subtherapeutic today- see above in Anticoagulation Summary.  Will instruct Stew Cabral to Increase their warfarin regimen (7.5 mg today, then increase to 3.75 mg Sun/Thurs, 7.5 mg all other days) - see above in Anticoagulation Summary.  2. Follow up in 1.5 weeks.  3. Patient declines warfarin refills.  4. Verbal and written information provided. Patient expresses understanding and has no further questions at this time.    Gael Turner PharmD

## 2024-04-25 ENCOUNTER — OFFICE VISIT (OUTPATIENT)
Dept: FAMILY MEDICINE CLINIC | Facility: CLINIC | Age: 64
End: 2024-04-25
Payer: COMMERCIAL

## 2024-04-25 VITALS
OXYGEN SATURATION: 95 % | SYSTOLIC BLOOD PRESSURE: 122 MMHG | HEIGHT: 69 IN | WEIGHT: 169 LBS | DIASTOLIC BLOOD PRESSURE: 60 MMHG | BODY MASS INDEX: 25.03 KG/M2 | HEART RATE: 73 BPM

## 2024-04-25 DIAGNOSIS — R73.03 PREDIABETES: ICD-10-CM

## 2024-04-25 DIAGNOSIS — Z23 ENCOUNTER FOR IMMUNIZATION: ICD-10-CM

## 2024-04-25 DIAGNOSIS — M79.89 MASS OF SOFT TISSUE OF NECK: Primary | ICD-10-CM

## 2024-04-25 DIAGNOSIS — E78.2 MIXED HYPERLIPIDEMIA: ICD-10-CM

## 2024-04-25 DIAGNOSIS — Z12.5 SCREENING FOR MALIGNANT NEOPLASM OF PROSTATE: ICD-10-CM

## 2024-04-25 DIAGNOSIS — K63.5 HYPERPLASTIC COLONIC POLYP, UNSPECIFIED PART OF COLON: ICD-10-CM

## 2024-04-25 PROBLEM — R19.00 MASS OF SOFT TISSUE OF ABDOMEN: Status: RESOLVED | Noted: 2024-01-23 | Resolved: 2024-04-25

## 2024-04-25 PROBLEM — R19.5 POSITIVE COLORECTAL CANCER SCREENING USING COLOGUARD TEST: Status: RESOLVED | Noted: 2024-03-19 | Resolved: 2024-04-25

## 2024-04-25 PROCEDURE — 99214 OFFICE O/P EST MOD 30 MIN: CPT | Performed by: FAMILY MEDICINE

## 2024-04-25 NOTE — PROGRESS NOTES
"Chief Complaint  Chief Complaint   Patient presents with    Hyperlipidemia    Diabetes    Mass       Subjective    History of Present Illness  Stew Cabral is a 63 y.o. male presents to [facility] for followup of HLD and pre-diabates.    Overall he is doing well. He has been working a lot of hours since his last appt, has had trouble finding time to go to the grocery so diet has been a lot of fast food still. He is trying to eat less red meat and more chicken, and has started having omega-3 trail mix at work.     The mass on the side of his right jaw is there, not gotten bigger, there is still no pain. Ultrasound in January 2024 showed a mass and recommend biopsy vs CT.     Objective   Vitals:    04/25/24 0817   BP: 122/60   Pulse: 73   SpO2: 95%   Weight: 76.7 kg (169 lb)   Height: 175.3 cm (69.02\")        BMI is within normal parameters. No other follow-up for BMI required.       Physical Exam  Vitals reviewed.   Constitutional:       Appearance: Normal appearance.   HENT:      Head: Normocephalic and atraumatic.   Cardiovascular:      Comments: Well perfused  Pulmonary:      Effort: Pulmonary effort is normal. No respiratory distress.   Abdominal:      General: There is no distension.   Musculoskeletal:         General: Normal range of motion.   Neurological:      Mental Status: He is alert. Mental status is at baseline.          The following data was reviewed by: Alicia Joya MD on 04/25/2024:  CMP          1/23/2024    10:53   CMP   Glucose CANCELED    BUN 15    Creatinine 1.03    Sodium 143    Potassium CANCELED    Chloride 105    Calcium 9.1    Total Protein 6.7    Albumin 4.4    Globulin 2.3    Total Bilirubin 0.3    Alkaline Phosphatase 97    AST (SGOT) 21    ALT (SGPT) 18    BUN/Creatinine Ratio 15      CBC          1/23/2024    10:53   CBC   WBC 8.2    RBC 5.41    Hemoglobin 16.3    Hematocrit 49.5    MCV 92    MCH 30.1    MCHC 32.9    RDW 13.1    Platelets 256  "     Lipid Panel          1/23/2024    10:53   Lipid Panel   Total Cholesterol 220    Triglycerides 407    HDL Cholesterol 34    VLDL Cholesterol 71    LDL Cholesterol  115      Most Recent A1C          1/23/2024    10:53   HGBA1C Most Recent   Hemoglobin A1C 6.4      Radiologic studies abdominal aortic aneurysm screening, screening chest CT, ultrasound soft tissue and GI studies colonoscopy and polyp pathology ; last PCP note      Assessment and Plan  Stew Cabral is a 63 y.o. male presents to [facility] today for followup of chronic health conditions      Diagnoses and all orders for this visit:    1. Mass of soft tissue of neck (Primary)  Comments:  Size stable, ultrasound recommend biopsy.  Ordered.  Orders:  -     US Fine Needle Aspiration BX 1st Lesion Right; Future  -     Tissue Pathology Exam; Standing    2. Prediabetes  Overview:  Most recent A1c 6.4 January 2024.  - For diet and lifestyle interventions encouraged decreasing carbohydrate intake to 150-200 g/day, reducing processed food, increasing fruit and vegetable intake, at least 120 minutes of physical activity per week as tolerated, and controlling blood pressure with a goal of less than 120/80.   - Continue metformin 500mg BID to prevent progression  - repeat A1C pending      Orders:  -     Comprehensive Metabolic Panel  -     Hemoglobin A1c    3. Mixed hyperlipidemia  Overview:  With history of hyperlipidemia, currently on crestor 20mg  - encouraged cont diet and lifestyle changes  -Continue med  -repeat lipid panel pending      Orders:  -     Comprehensive Metabolic Panel  -     Lipid Panel    4. Screening for malignant neoplasm of prostate  -     PSA Screen    5. Encounter for immunization  -     RSVPreF3 Vac Recomb Adjuvanted (AREXVY) 120 MCG/0.5ML reconstituted suspension injection; Inject 0.5 mL into the appropriate muscle as directed by prescriber 1 (One) Time for 1 dose.  Dispense: 1 kit; Refill: 0    6.  Hyperplastic colonic polyp, unspecified part of colon  Overview:  Positive Cologuard screening.  Multiple hyperplastic colon polyps on colonoscopy 2024.  Next due 2027.          Patient voiced understanding and agreement with plan of care and had no further questions or concerns at this time.     I spent 32 minute son this encounter.  Problems addressed:  established problem: stable, establish problem: requiring further evaluation  Complexity: labs ordered yes, labs reviewed yes, independently reviewied diagnositic tests or imaging previously reviewed by another physician, records reviewied and summarized    Alicia Joya MD  Family Medicine  [facility]      Follow Up  Return if symptoms worsen or fail to improve.    Patient Instructions   Today: Recheck labs. RSV vaccine prescription sent to pharmacy. Ok to ask doc prescribing your vyvanse if ok to transfer to PCP.    Meds: Non changes    Referrals: Biopsy neck, you should receive a call within 1 week to schedule the appointment.  If you do not hear anything please let us know.    Cholesterol plan:  - Reduce intake of trans and saturated fats and red meat  - Consider trying the Mediterranean diet  - Supplements you can try: omega-3 1-2g daily, berberine 500mg daily, and/or whole flaxseed added tot hings like yogurt, oatmeal, salads, smoothies  - Increase physical activity to 120 minutes a week as tolerated  -If taking a statin, stop the med immediately if you experience muscle aches or pains, and/or dark urine like tea or cola.    Check out: https://www.heart.org/en/health-topics/cholesterol    Prediabetes plan  - Decreasing carbohydrate intake to 150-200 g/day, reducing processed food, increasing fruit and vegetable intake, at least 120 minutes of physical activity per week as tolerated, and controlling blood pressure with a goal of less than 120/80.   - cont metformin to control your blood sugar.

## 2024-04-25 NOTE — PATIENT INSTRUCTIONS
Today: Recheck labs. RSV vaccine prescription sent to pharmacy. Ok to ask doc prescribing your vyvanse if ok to transfer to PCP.    Meds: Non changes    Referrals: Biopsy neck, you should receive a call within 1 week to schedule the appointment.  If you do not hear anything please let us know.    Cholesterol plan:  - Reduce intake of trans and saturated fats and red meat  - Consider trying the Mediterranean diet  - Supplements you can try: omega-3 1-2g daily, berberine 500mg daily, and/or whole flaxseed added tot hings like yogurt, oatmeal, salads, smoothies  - Increase physical activity to 120 minutes a week as tolerated  -If taking a statin, stop the med immediately if you experience muscle aches or pains, and/or dark urine like tea or cola.    Check out: https://www.heart.org/en/health-topics/cholesterol    Prediabetes plan  - Decreasing carbohydrate intake to 150-200 g/day, reducing processed food, increasing fruit and vegetable intake, at least 120 minutes of physical activity per week as tolerated, and controlling blood pressure with a goal of less than 120/80.   - cont metformin to control your blood sugar.

## 2024-04-26 LAB
ALBUMIN SERPL-MCNC: 4 G/DL (ref 3.5–5.2)
ALBUMIN/GLOB SERPL: 2 G/DL
ALP SERPL-CCNC: 109 U/L (ref 39–117)
ALT SERPL-CCNC: 20 U/L (ref 1–41)
AST SERPL-CCNC: 19 U/L (ref 1–40)
BILIRUB SERPL-MCNC: 0.2 MG/DL (ref 0–1.2)
BUN SERPL-MCNC: 19 MG/DL (ref 8–23)
BUN/CREAT SERPL: 18.8 (ref 7–25)
CALCIUM SERPL-MCNC: 8.9 MG/DL (ref 8.6–10.5)
CHLORIDE SERPL-SCNC: 107 MMOL/L (ref 98–107)
CHOLEST SERPL-MCNC: 155 MG/DL (ref 0–200)
CO2 SERPL-SCNC: 24.8 MMOL/L (ref 22–29)
CREAT SERPL-MCNC: 1.01 MG/DL (ref 0.76–1.27)
EGFRCR SERPLBLD CKD-EPI 2021: 84.1 ML/MIN/1.73
GLOBULIN SER CALC-MCNC: 2 GM/DL
GLUCOSE SERPL-MCNC: 119 MG/DL (ref 65–99)
HBA1C MFR BLD: 6.4 % (ref 4.8–5.6)
HDLC SERPL-MCNC: 42 MG/DL (ref 40–60)
LDLC SERPL CALC-MCNC: 67 MG/DL (ref 0–100)
POTASSIUM SERPL-SCNC: 4 MMOL/L (ref 3.5–5.2)
PROT SERPL-MCNC: 6 G/DL (ref 6–8.5)
PSA SERPL-MCNC: 3.19 NG/ML (ref 0–4)
SODIUM SERPL-SCNC: 140 MMOL/L (ref 136–145)
TRIGL SERPL-MCNC: 290 MG/DL (ref 0–150)
VLDLC SERPL CALC-MCNC: 46 MG/DL (ref 5–40)

## 2024-04-28 NOTE — PROGRESS NOTES
Hello!    Here are the results of your most recent labs:    Your Comprehensive Metabolic Panel and PSA was all normal.     Your Cholesterol and Hgb A1C was abnormal.     Your total cholesterol numbers are much improved from when we last checked, especially your LDL.  However your triglycerides, a type of cholesterol is still elevated.  I would continue the cholesterol medicine due to your risk of heart attack or stroke (ASCVD) being more than 7%.    The 10-year ASCVD risk score (Emily NICHOLS, et al., 2019) is: 8.5%    Values used to calculate the score:      Age: 63 years      Sex: Male      Is Non- : No      Diabetic: No      Tobacco smoker: No      Systolic Blood Pressure: 122 mmHg      Is BP treated: No      HDL Cholesterol: 42 mg/dL      Total Cholesterol: 155 mg/dL    Your A1c did not change, it is still 6.4; if it goes over 6.5 you will have type 2 diabetes. I recommend decreasing carbohydrate intake to 150-200 g/day, reducing processed food, increasing fruit and vegetable intake, at least 120 minutes of physical activity per week as tolerated, and controlling blood pressure with a goal of less than 120/80.  While I am happy that the A1c did not get worse, I am concerned that the metformin 500 twice a day may not be enough.  Would you like to focus on significantly reducing carbohydrates and increasing exercise, or would you like to increase your metformin to 1000 mg twice daily?    Repeat labs in 3 months, ordered for [employer]- please go there fasting (no food after midnight, water/meds/black coffee ok).     Please continue your current medications.  Please contact me with any questions.    Thank you!  Dr. Joya

## 2024-04-29 DIAGNOSIS — G47.26 SHIFT WORK SLEEP DISORDER: Primary | ICD-10-CM

## 2024-04-29 DIAGNOSIS — R73.03 PREDIABETES: ICD-10-CM

## 2024-04-29 DIAGNOSIS — E78.2 MIXED HYPERLIPIDEMIA: ICD-10-CM

## 2024-05-01 LAB
ALBUMIN SERPL-MCNC: 4 G/DL (ref 3.5–5.2)
ALBUMIN/GLOB SERPL: 2 G/DL
ALP SERPL-CCNC: 109 U/L (ref 39–117)
ALT SERPL-CCNC: 20 U/L (ref 1–41)
AST SERPL-CCNC: 19 U/L (ref 1–40)
BILIRUB SERPL-MCNC: 0.2 MG/DL (ref 0–1.2)
BUN SERPL-MCNC: 19 MG/DL (ref 8–23)
BUN/CREAT SERPL: 24.1 (ref 7–25)
CALCIUM SERPL-MCNC: 8.9 MG/DL (ref 8.6–10.5)
CHLORIDE SERPL-SCNC: 107 MMOL/L (ref 98–107)
CHOLEST SERPL-MCNC: 155 MG/DL (ref 0–200)
CO2 SERPL-SCNC: 24.8 MMOL/L (ref 22–29)
CREAT SERPL-MCNC: 0.79 MG/DL (ref 0.76–1.27)
EGFRCR SERPLBLD CKD-EPI 2021: 100.4 ML/MIN/1.73
GLOBULIN SER CALC-MCNC: 2 GM/DL
GLUCOSE SERPL-MCNC: 119 MG/DL (ref 65–99)
HBA1C MFR BLD: 6.4 % (ref 4.8–5.6)
HDLC SERPL-MCNC: 42 MG/DL (ref 40–60)
LDLC SERPL CALC-MCNC: 67 MG/DL (ref 0–100)
POTASSIUM SERPL-SCNC: 4 MMOL/L (ref 3.5–5.2)
PROT SERPL-MCNC: 6 G/DL (ref 6–8.5)
PSA SERPL-MCNC: 3.19 NG/ML (ref 0–4)
SODIUM SERPL-SCNC: 140 MMOL/L (ref 136–145)
TRIGL SERPL-MCNC: 290 MG/DL (ref 0–150)
VLDLC SERPL CALC-MCNC: 46 MG/DL (ref 5–40)

## 2024-05-05 NOTE — PROGRESS NOTES
05/17/24 0001   Pre-Procedure Phone Call   Procedure Time Verified Yes   Arrival Time 0630   Procedure Location Verified Yes   Medical History Reviewed No   NPO Status Reinforced Yes   Ride and Caregiver Arranged Yes   Phone Number for Ride/Caregiver informed pt that his INR needs to be less than 3 to proceed with procedure. Pt voiced understanding.   Patient Knows to Bring Current Medications No   Bring Outside Films Requested No

## 2024-05-17 ENCOUNTER — HOSPITAL ENCOUNTER (OUTPATIENT)
Dept: ULTRASOUND IMAGING | Facility: HOSPITAL | Age: 64
Discharge: HOME | End: 2024-05-17
Admitting: FAMILY MEDICINE
Payer: COMMERCIAL

## 2024-05-17 ENCOUNTER — ANTICOAGULATION VISIT (OUTPATIENT)
Dept: PHARMACY | Facility: HOSPITAL | Age: 64
End: 2024-05-17
Payer: COMMERCIAL

## 2024-05-17 ENCOUNTER — TELEPHONE (OUTPATIENT)
Dept: FAMILY MEDICINE CLINIC | Facility: CLINIC | Age: 64
End: 2024-05-17
Payer: COMMERCIAL

## 2024-05-17 VITALS
WEIGHT: 173 LBS | DIASTOLIC BLOOD PRESSURE: 79 MMHG | HEART RATE: 63 BPM | BODY MASS INDEX: 25.62 KG/M2 | SYSTOLIC BLOOD PRESSURE: 138 MMHG | TEMPERATURE: 98.2 F | OXYGEN SATURATION: 97 % | HEIGHT: 69 IN | RESPIRATION RATE: 14 BRPM

## 2024-05-17 DIAGNOSIS — M79.89 MASS OF SOFT TISSUE OF NECK: ICD-10-CM

## 2024-05-17 DIAGNOSIS — Z95.4 HISTORY OF AORTIC VALVE REPLACEMENT WITH METALLIC VALVE: Primary | ICD-10-CM

## 2024-05-17 LAB
INR PPP: 4.4 (ref 0.8–1.2)
PROTHROMBIN TIME: 49 SECONDS (ref 12.8–15.2)

## 2024-05-17 PROCEDURE — 85610 PROTHROMBIN TIME: CPT

## 2024-05-17 NOTE — TELEPHONE ENCOUNTER
Received call from [facility], pt was scheduled for neck biopsy this morning, had to be rescheduled due to pt's INR being 4.4.    Pt aware of this just fyi for PCP

## 2024-05-17 NOTE — DISCHARGE INSTRUCTIONS
EDUCATION / DISCHARGE INSTRUCTIONS  Aspiration:  An aspiration is a procedure used to take a sample of cells or tissue from a lump, mass or other area of concern.   Within a week the radiologist will send a report to your physician.  A pathologist will also examine the tissue and send a report.    Post Procedure:    *  Rest today (no pushing pulling or straining).   *  Slowly increase activity tomorow.    *  If you received sedation do not drive for 24 hours.   *  Keep dressing clean and dry.   *  Leave dressing on puncture site for 24 hours.    *  You may shower when dressing removed.   *  If needed, use an ice pack at the site for up to 20 minutes at a time for pain.    Call your doctor if experiencing:   *  Signs of infection such as redness, swelling, excessive pain and / or foul smelling drainage from the puncture site.   *  Chills or fever over 101 degrees (by mouth).   *  Unrelieved pain.   *  Any new or severe symptoms.      Following the procedure:     Follow-up with the ordering physician as directed.    Continue to take other medications as directed by your physician unless otherwise instructed.   If applicable, resume taking your blood thinners or Aspirin on _Saturday, May 18, 2024_.     If you have any concerns please call the Radiology Nurses Desk at [phone].  You are the most important factor in your recovery.  Follow the above instructions carefully.

## 2024-05-17 NOTE — NURSING NOTE
Called pt's provider and notified Mari in office of pt's INR and that we were unable to perform the neck bx.

## 2024-05-21 NOTE — PROGRESS NOTES
Anticoagulation Clinic Progress Note    Anticoagulation Summary  As of 2024      INR goal:  2.5-3.5   TTR:  41.9% (5.7 y)   INR used for dosin.4 (2024)   Warfarin maintenance plan:  3.75 mg every Sun, Thu; 7.5 mg all other days   Weekly warfarin total:  45 mg   Plan last modified:  Gael Turner, PharmD (2024)   Next INR check:  2024   Priority:  Maintenance   Target end date:  Indefinite    Indications    History of aortic valve replacement with metallic valve [Z95.4]                 Anticoagulation Episode Summary       INR check location:      Preferred lab:      Send INR reminders to:   [facility]    Comments:            Anticoagulation Care Providers       Provider Role Specialty Phone number    Kitty Lagunas MD Referring Cardiology [phone]            Clinic Interview:  Patient Findings     Positives:  Other complaints    Negatives:  Signs/symptoms of thrombosis, Signs/symptoms of bleeding,   Laboratory test error suspected, Change in health, Change in alcohol use,   Change in activity, Upcoming invasive procedure, Emergency department   visit, Upcoming dental procedure, Missed doses, Extra doses, Change in   medications, Change in diet/appetite, Hospital admission, Bruising    Comments:  Scheduled for fine needle aspirate for a mass in his neck.   Unfortunately, patient did not inform us of the procedure so we can   facilitate a therapeutic INR prior to procedure, so procedure was   cancelled in the setting of INR 4.4.  Multiple attempts made to contact   patient with instructions and follow up without response. Left voicemail   with instructions and continue to reach out to reschedule INR check.       Clinical Outcomes     Negatives:  Major bleeding event, Thromboembolic event,   Anticoagulation-related hospital admission, Anticoagulation-related ED   visit, Anticoagulation-related fatality    Comments:  Scheduled for fine needle aspirate for a mass in his  neck.   Unfortunately, patient did not inform us of the procedure so we can   facilitate a therapeutic INR prior to procedure, so procedure was   cancelled in the setting of INR 4.4.  Multiple attempts made to contact   patient with instructions and follow up without response. Left voicemail   with instructions and continue to reach out to reschedule INR check.         INR History:      11/9/2023     8:00 AM 12/7/2023     8:00 AM 1/4/2024     8:15 AM 2/29/2024     8:15 AM 3/15/2024     8:00 AM 4/18/2024     8:45 AM 5/17/2024     8:44 AM   Anticoagulation Monitoring   INR 3.3 3.0 4.3 2.7 4.7 2.0 4.4   INR Date 11/9/2023 12/7/2023 1/4/2024 2/29/2024 3/15/2024 4/18/2024 5/17/2024   INR Goal 2.5-3.5 2.5-3.5 2.5-3.5 2.5-3.5 2.5-3.5 2.5-3.5 2.5-3.5   Trend Same Same Same Same Down Up Same   Last Week Total 45 mg 45 mg 43.75 mg 45 mg 45 mg 41.25 mg 45 mg   Next Week Total 45 mg 45 mg 41.25 mg 45 mg 33.75 mg 48.75 mg 37.5 mg   Sun 3.75 mg 3.75 mg 3.75 mg 3.75 mg 3.75 mg 3.75 mg 3.75 mg   Mon 7.5 mg 7.5 mg 7.5 mg 7.5 mg 7.5 mg 7.5 mg 7.5 mg   Tue 7.5 mg 7.5 mg 7.5 mg 7.5 mg 3.75 mg 7.5 mg 7.5 mg   Wed 7.5 mg 7.5 mg 7.5 mg 7.5 mg 7.5 mg 7.5 mg 7.5 mg   Thu 3.75 mg 3.75 mg Hold (1/4); Otherwise 3.75 mg 3.75 mg 3.75 mg 7.5 mg (4/18); Otherwise 3.75 mg 3.75 mg   Fri 7.5 mg 7.5 mg 7.5 mg 7.5 mg Hold (3/15); Otherwise 7.5 mg 7.5 mg Hold (5/17); Otherwise 7.5 mg   Sat 7.5 mg 7.5 mg 7.5 mg 7.5 mg 7.5 mg 7.5 mg 7.5 mg       Plan:  1. INR is Supratherapeutic today- see above in Anticoagulation Summary.   Will instruct Stew LLOYD Misha to Change their warfarin regimen- see above in Anticoagulation Summary.  2. Follow up in 1 weeks  3. Multiple messages requesting return call since 5/17.  Left HIPAA compliant instructions and request for follow up on 5/17 prior to leaving office for weekend. Continuing to attempt to reach out to schedule follow up INR with no response.       Michelle Narvaez, ChaparroD

## 2024-05-29 NOTE — PROGRESS NOTES
Subjective:     Encounter Date:05/30/2024      Patient ID: Stew Cabral is a 63 y.o. male.    Chief Complaint:  History of Present Illness    This is a 63-year-old with bicuspid aortic valve resulting in critical aortic stenosis, status post mechanical aortic valve replacement with a 25 mm ATS valve in 1/2009, who presents for follow-up.     A repeat echocardiogram was performed in 2/2022 at the time of his last follow-up.  This showed normal left ventricular systolic function wall motion with an EF of 52%, mild concentric left ventricular hypertrophy, grade 1 diastolic dysfunction, normal function of the mechanical aortic valve, normal right ventricular systolic pressure and mild dilatation of the ascending aorta measuring about 4 cm.  At that office visit she reported that he been diagnosed with shingles over the right side of his upper face but otherwise was doing well from a cardiac standpoint.    This is the patient's first office visit since 2/2022.  Fortunately the patient has been doing well from a cardiac standpoint.  He denies any chest pain, shortness of breath, palpitations, orthopnea, near-syncope or syncope or lower extremity swelling.    He reports that about a month ago he had a brief episode where he had a he was speaking with somebody at work while holding his reading glasses in his right hand.  He had a sudden onset of contraction of his right hand causing him to drop his glasses associated with inability to speak.  Again this only lasted a few seconds and resolved without any recurrence.    He continues to follow-up closely with the medication management clinic.  His INR's on review have been either therapeutic or supratherapeutic.  It does not appear that he has had any subtherapeutic levels.    Prior History:  The patient was initially followed by Dr. Michelle Arenas for his aortic stenosis.  Around the time of his aortic valve surgery he was noted to have a nonischemic cardiomyopathy with  an ejection fraction of 30%.  A repeat echocardiogram following his surgery showed normalization of his ejection fraction.  I began following him in 12/2013 when he presented to establish care.  I set him up for repeat echocardiogram at that time to continue show normal LV function and a normally functioning aortic valve.  He had a repeat echocardiogram in 5/2016 that continued to show normal LV function and a normal functioning aortic valve.  He initial checked his own INRs with a home monitor but became non-compliant for a period of time and was dropped by the home monitoring service.  At this point he was set up with the anticoagulation clinic.     Repeat echocardiogram in 1/2019 showed normal left ventricular systolic function with an EF of 59%, normal diastolic function, normal functioning mechanical aortic valve.      At a routine follow-up with EUFEMIA Bruno in 1/2021 was noted to have elevated blood pressures.  It is remained to his management and the recommendation was to monitor his blood pressures and work on lifestyle changes.    Review of Systems   Constitutional: Negative for malaise/fatigue.   HENT:  Negative for hearing loss, hoarse voice, nosebleeds and sore throat.    Eyes:  Negative for pain.   Cardiovascular:  Negative for chest pain, claudication, cyanosis, dyspnea on exertion, irregular heartbeat, leg swelling, near-syncope, orthopnea, palpitations, paroxysmal nocturnal dyspnea and syncope.   Respiratory:  Negative for shortness of breath and snoring.    Endocrine: Negative for cold intolerance, heat intolerance, polydipsia, polyphagia and polyuria.   Skin:  Negative for itching and rash.   Musculoskeletal:  Negative for arthritis, falls, joint pain, joint swelling, muscle cramps, muscle weakness and myalgias.   Gastrointestinal:  Negative for constipation, diarrhea, dysphagia, heartburn, hematemesis, hematochezia, melena, nausea and vomiting.   Genitourinary:  Negative for frequency,  hematuria and hesitancy.   Neurological:  Negative for excessive daytime sleepiness, dizziness, headaches, light-headedness, numbness and weakness.   Psychiatric/Behavioral:  Negative for depression. The patient is not nervous/anxious.          Current Outpatient Medications:     metFORMIN ER (GLUCOPHAGE-XR) 500 MG 24 hr tablet, Take 2 tablets by mouth Daily With Breakfast., Disp: 90 tablet, Rfl: 3    rosuvastatin (Crestor) 20 MG tablet, Take 1 tablet by mouth Every Night. Indications: High Amount of Fats in the Blood, High Amount of Triglycerides in the Blood, Disp: 90 tablet, Rfl: 3    Vyvanse 50 MG capsule, TK 1 C QAM, Disp: , Rfl:     warfarin (COUMADIN) 7.5 MG tablet, TAKE 1/2 TABLET(3.75 MG) BY MOUTH ON SUN/THURS AND TAKE 1 TABLET BY MOUTH ALL OTHER DAYS OR AS DIRECTED, Disp: 85 tablet, Rfl: 1    Past Medical History:   Diagnosis Date    ADD (attention deficit disorder)     Aortic valvar stenosis     Bicuspid aortic valve     CHF (congestive heart failure)     Colon polyps     FOLLOWED BY DR. 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 BRAYDON MOFFETT AT [facility]    CORONARY ANGIOPLASTY Left     AV stenosis    CORONARY ARTERY BYPASS GRAFT  2009       Family History   Problem Relation Age of Onset    Hypertension Father     Cancer Maternal Grandfather     Lung cancer Paternal Grandmother         smoker    Malig Hyperthermia Neg Hx        Social History     Tobacco Use    Smoking status:  "Former     Current packs/day: 0.00     Average packs/day: 1 pack/day for 30.8 years (30.8 ttl pk-yrs)     Types: Cigarettes     Start date: 1993     Quit date: 2023     Years since quittin.5     Passive exposure: Past    Smokeless tobacco: Never    Tobacco comments:     Caffiene daily   Vaping Use    Vaping status: Never Used   Substance Use Topics    Alcohol use: Yes     Alcohol/week: 0.0 - 1.0 standard drinks of alcohol     Comment: Rarely    Drug use: Yes     Types: Marijuana     Comment: CBD gummy occasionally         ECG 12 Lead    Date/Time: 2024 12:46 PM  Performed by: Kitty Lagunas MD    Authorized by: Kitty Lagunas MD  Comparison: compared with previous ECG   Comparison to previous ECG: Sinus pause is new  Rhythm: sinus rhythm  Comments: Isolated sinus pause versus nonconducted PAC             Objective:     Visit Vitals  /78 (BP Location: Left arm, Patient Position: Sitting, Cuff Size: Adult)   Pulse 62   Ht 175.3 cm (69\")   Wt 78.1 kg (172 lb 3.2 oz)   SpO2 98%   BMI 25.43 kg/m²         Constitutional:       Appearance: Normal appearance. Well-developed.   HENT:      Head: Normocephalic and atraumatic.   Neck:      Vascular: No carotid bruit or JVD.   Pulmonary:      Effort: Pulmonary effort is normal.      Breath sounds: Normal breath sounds.   Cardiovascular:      Normal rate. Regular rhythm.      No gallop.       Comments: Mechanical aortic valve sound  Pulses:     Radial: 2+ bilaterally.  Edema:     Peripheral edema absent.   Abdominal:      Palpations: Abdomen is soft.   Skin:     General: Skin is warm and dry.   Neurological:      Mental Status: Alert and oriented to person, place, and time.             Assessment:          Diagnosis Plan   1. Ascending aorta dilatation        2. History of aortic valve replacement with metallic valve        3. Warfarin anticoagulation        4. Mixed hyperlipidemia               Plan:         1.  Ascending aortic dilatation.  Measuring " only 4 cm on his last echocardiogram in 2022.  Unclear if this is something that was present before and prior echocardiograms.  Regardless we will plan on a repeat echocardiogram to reevaluate.  2.  Right hand weakness/dysarthria.  Isolated brief episode about a month ago.  Suspicious for neurologic event although unclear at this point.  Doubt it is due to an issue with his mechanical aortic valve since his INR's have been therapeutic or supratherapeutic and he has not had any subtherapeutic levels in the last several months.  Plan for echocardiogram as above.  Will monitor for recurrent episodes.  3.  Mechanical aortic valve.  On chronic anticoagulation with warfarin which is managed by the medication management clinic.  Normal heart sounds on exam.  Plan for repeat echocardiogram as above.  4.  Hyperlipidemia.  On rosuvastatin which is managed by Dr. Joya.    Will call and discuss results of his echocardiogram.  Will tentatively plan on seeing the patient back again in 1 year or sooner if further issues arise.

## 2024-05-30 ENCOUNTER — OFFICE VISIT (OUTPATIENT)
Age: 64
End: 2024-05-30
Payer: COMMERCIAL

## 2024-05-30 ENCOUNTER — ANTICOAGULATION VISIT (OUTPATIENT)
Dept: PHARMACY | Facility: HOSPITAL | Age: 64
End: 2024-05-30
Payer: COMMERCIAL

## 2024-05-30 VITALS
HEART RATE: 62 BPM | OXYGEN SATURATION: 98 % | HEIGHT: 69 IN | DIASTOLIC BLOOD PRESSURE: 78 MMHG | SYSTOLIC BLOOD PRESSURE: 120 MMHG | WEIGHT: 172.2 LBS | BODY MASS INDEX: 25.51 KG/M2

## 2024-05-30 DIAGNOSIS — Z79.01 WARFARIN ANTICOAGULATION: ICD-10-CM

## 2024-05-30 DIAGNOSIS — Z95.4 HISTORY OF AORTIC VALVE REPLACEMENT WITH METALLIC VALVE: Primary | ICD-10-CM

## 2024-05-30 DIAGNOSIS — Z95.4 HISTORY OF AORTIC VALVE REPLACEMENT WITH METALLIC VALVE: ICD-10-CM

## 2024-05-30 DIAGNOSIS — E78.2 MIXED HYPERLIPIDEMIA: ICD-10-CM

## 2024-05-30 DIAGNOSIS — I77.810 ASCENDING AORTA DILATATION: Primary | ICD-10-CM

## 2024-05-30 LAB
INR PPP: 3.5 (ref 0.91–1.09)
PROTHROMBIN TIME: 41.4 SECONDS (ref 10–13.8)

## 2024-05-30 PROCEDURE — 36416 COLLJ CAPILLARY BLOOD SPEC: CPT

## 2024-05-30 PROCEDURE — G0463 HOSPITAL OUTPT CLINIC VISIT: HCPCS

## 2024-05-30 PROCEDURE — 85610 PROTHROMBIN TIME: CPT

## 2024-05-30 NOTE — LETTER
May 30, 2024       No Recipients    Patient: Stew Cabral   YOB: 1960   Date of Visit: 5/30/2024       Dear Alicia Joya MD    Stew Cabral was in my office today. Below is a copy of my note.    If you have questions, please do not hesitate to call me. I look forward to following Stew along with you.         Sincerely,        Kitty Lagunas MD        CC:   No Recipients        Subjective:     Encounter Date:05/30/2024      Patient ID: Stew Cabral is a 63 y.o. male.    Chief Complaint:  History of Present Illness    This is a 63-year-old with bicuspid aortic valve resulting in critical aortic stenosis, status post mechanical aortic valve replacement with a 25 mm ATS valve in 1/2009, who presents for follow-up.     A repeat echocardiogram was performed in 2/2022 at the time of his last follow-up.  This showed normal left ventricular systolic function wall motion with an EF of 52%, mild concentric left ventricular hypertrophy, grade 1 diastolic dysfunction, normal function of the mechanical aortic valve, normal right ventricular systolic pressure and mild dilatation of the ascending aorta measuring about 4 cm.  At that office visit she reported that he been diagnosed with shingles over the right side of his upper face but otherwise was doing well from a cardiac standpoint.    This is the patient's first office visit since 2/2022.     Prior History:  The patient was initially followed by Dr. Michelle Arenas for his aortic stenosis.  Around the time of his aortic valve surgery he was noted to have a nonischemic cardiomyopathy with an ejection fraction of 30%.  A repeat echocardiogram following his surgery showed normalization of his ejection fraction.  I began following him in 12/2013 when he presented to [facility] care.  I set him up for repeat echocardiogram at that time to continue show normal LV function and a normally functioning aortic valve.  He had a repeat echocardiogram in 5/2016 that  continued to show normal LV function and a normal functioning aortic valve.  He initial checked his own INRs with a home monitor but became non-compliant for a period of time and was dropped by the home monitoring service.  At this point he was set up with the anticoagulation clinic.     Repeat echocardiogram in 1/2019 showed normal left ventricular systolic function with an EF of 59%, normal diastolic function, normal functioning mechanical aortic valve.      At a routine follow-up with EUFEMIA Bruno in 1/2021 was noted to have elevated blood pressures.  It is remained to his management and the recommendation was to monitor his blood pressures and work on lifestyle changes.    ROS      Current Outpatient Medications:   •  metFORMIN ER (GLUCOPHAGE-XR) 500 MG 24 hr tablet, Take 2 tablets by mouth Daily With Breakfast., Disp: 90 tablet, Rfl: 3  •  rosuvastatin (Crestor) 20 MG tablet, Take 1 tablet by mouth Every Night. Indications: High Amount of Fats in the Blood, High Amount of Triglycerides in the Blood, Disp: 90 tablet, Rfl: 3  •  Vyvanse 50 MG capsule, TK 1 C QAM, Disp: , Rfl:   •  warfarin (COUMADIN) 7.5 MG tablet, TAKE 1/2 TABLET(3.75 MG) BY MOUTH ON SUN/THURS AND TAKE 1 TABLET BY MOUTH ALL OTHER DAYS OR AS DIRECTED, Disp: 85 tablet, Rfl: 1    Past Medical History:   Diagnosis Date   • ADD (attention deficit disorder)    • Aortic valvar stenosis    • Bicuspid aortic valve    • CHF (congestive heart failure)    • Colon polyps     FOLLOWED BY DR. 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 BRAYDON MOFFETT AT [facility]   • CORONARY ANGIOPLASTY Left     AV stenosis   • CORONARY ARTERY BYPASS GRAFT         Family History   Problem Relation Age of Onset   • Hypertension Father    • Cancer Maternal Grandfather    • Lung cancer Paternal Grandmother         smoker   • Malig Hyperthermia Neg Hx        Social History     Tobacco Use   • Smoking status: Former     Current packs/day: 0.00     Average packs/day: 1 pack/day for 30.8 years (30.8 ttl pk-yrs)     Types: Cigarettes     Start date: 1993     Quit date: 2023     Years since quittin.5     Passive exposure: Past   • Smokeless tobacco: Never   • Tobacco comments:     Caffiene daily   Vaping Use   • Vaping status: Never Used   Substance Use Topics   • Alcohol use: Yes     Alcohol/week: 0.0 - 1.0 standard drinks of alcohol     Comment: Rarely   • Drug use: Yes     Types: Marijuana     Comment: CBD gummy occasionally       Procedures       Objective:     Visit Vitals  /78 (BP Location: Left arm, Patient Position: Sitting, Cuff Size: Adult)   Pulse 62   Ht 175.3 cm (69\")   Wt 78.1 kg (172 lb 3.2 oz)   SpO2 98%   BMI 25.43 kg/m²         Physical Exam    Lab Review:       Assessment:          Diagnosis Plan   1. Ascending aorta dilatation        2. History of aortic valve replacement with metallic valve        3. Warfarin anticoagulation        4. Mixed hyperlipidemia               Plan:                "

## 2024-05-30 NOTE — PROGRESS NOTES
I have supervised and reviewed the notes, assessments, and/or procedures performed. I personally performed the assessment and implemented the plan. I concur with the documentation of this patient encounter.    Merlyn Hassan, [facility]

## 2024-05-30 NOTE — PROGRESS NOTES
Anticoagulation Clinic Progress Note    Anticoagulation Summary  As of 5/30/2024      INR goal:  2.5-3.5   TTR:  41.6% (5.7 y)   INR used for dosing:  3.5 (5/30/2024)   Warfarin maintenance plan:  3.75 mg every Sun, Thu; 7.5 mg all other days   Weekly warfarin total:  45 mg   No change documented:  Sue Woodruff   Plan last modified:  Gael Turner, PharmD (4/18/2024)   Next INR check:  6/13/2024   Priority:  Maintenance   Target end date:  Indefinite    Indications    History of aortic valve replacement with metallic valve [Z95.4]                 Anticoagulation Episode Summary       INR check location:      Preferred lab:      Send INR reminders to:  [facility] PO[facility]    Comments:            Anticoagulation Care Providers       Provider Role Specialty Phone number    Kitty Lagunas MD Referring Cardiology [phone]            Clinic Interview:  Patient Findings     Negatives:  Signs/symptoms of thrombosis, Signs/symptoms of bleeding,   Laboratory test error suspected, Change in health, Change in alcohol use,   Change in activity, Upcoming invasive procedure, Emergency department   visit, Upcoming dental procedure, Missed doses, Extra doses, Change in   medications, Change in diet/appetite, Hospital admission, Bruising, Other   complaints      Clinical Outcomes     Negatives:  Major bleeding event, Thromboembolic event,   Anticoagulation-related hospital admission, Anticoagulation-related ED   visit, Anticoagulation-related fatality        INR History:      12/7/2023     8:00 AM 1/4/2024     8:15 AM 2/29/2024     8:15 AM 3/15/2024     8:00 AM 4/18/2024     8:45 AM 5/17/2024     8:44 AM 5/30/2024     8:30 AM   Anticoagulation Monitoring   INR 3.0 4.3 2.7 4.7 2.0 4.4 3.5   INR Date 12/7/2023 1/4/2024 2/29/2024 3/15/2024 4/18/2024 5/17/2024 5/30/2024   INR Goal 2.5-3.5 2.5-3.5 2.5-3.5 2.5-3.5 2.5-3.5 2.5-3.5 2.5-3.5   Trend Same Same Same Down Up Same Same   Last Week Total 45 mg 43.75 mg 45  mg 45 mg 41.25 mg 45 mg 45 mg   Next Week Total 45 mg 41.25 mg 45 mg 33.75 mg 48.75 mg 37.5 mg 45 mg   Sun 3.75 mg 3.75 mg 3.75 mg 3.75 mg 3.75 mg 3.75 mg 3.75 mg   Mon 7.5 mg 7.5 mg 7.5 mg 7.5 mg 7.5 mg 7.5 mg 7.5 mg   Tue 7.5 mg 7.5 mg 7.5 mg 3.75 mg 7.5 mg 7.5 mg 7.5 mg   Wed 7.5 mg 7.5 mg 7.5 mg 7.5 mg 7.5 mg 7.5 mg 7.5 mg   Thu 3.75 mg Hold (1/4); Otherwise 3.75 mg 3.75 mg 3.75 mg 7.5 mg (4/18); Otherwise 3.75 mg 3.75 mg 3.75 mg   Fri 7.5 mg 7.5 mg 7.5 mg Hold (3/15); Otherwise 7.5 mg 7.5 mg Hold (5/17); Otherwise 7.5 mg 7.5 mg   Sat 7.5 mg 7.5 mg 7.5 mg 7.5 mg 7.5 mg 7.5 mg 7.5 mg       Plan:  1. INR is therapeutic today- see above in Anticoagulation Summary.   Will instruct Stew GABINO Cabral to continue their warfarin regimen- see above in Anticoagulation Summary.  2. Follow up in 2 weeks.  3. Patient declines warfarin refills.  4. Verbal and written information provided. Patient expresses understanding and has no further questions at this time.    Sue Woodruff

## 2024-06-25 ENCOUNTER — ANTICOAGULATION VISIT (OUTPATIENT)
Dept: PHARMACY | Facility: HOSPITAL | Age: 64
End: 2024-06-25
Payer: COMMERCIAL

## 2024-06-25 DIAGNOSIS — Z95.4 HISTORY OF AORTIC VALVE REPLACEMENT WITH METALLIC VALVE: Primary | ICD-10-CM

## 2024-06-25 LAB
INR PPP: 4.3 (ref 0.91–1.09)
PROTHROMBIN TIME: 52.2 SECONDS (ref 10–13.8)

## 2024-06-25 PROCEDURE — 85610 PROTHROMBIN TIME: CPT

## 2024-06-25 PROCEDURE — 36416 COLLJ CAPILLARY BLOOD SPEC: CPT

## 2024-06-25 PROCEDURE — G0463 HOSPITAL OUTPT CLINIC VISIT: HCPCS

## 2024-06-25 NOTE — PROGRESS NOTES
I have supervised and reviewed the notes, assessments, and/or procedures performed. The documented assessment and plan were developed cooperatively, and the plan was implemented in my presence. I concur with the documentation of this patient encounter.    Gael Turner, PharmD

## 2024-06-25 NOTE — PROGRESS NOTES
Anticoagulation Clinic Progress Note    Anticoagulation Summary  As of 2024      INR goal:  2.5-3.5   TTR:  41.1% (5.8 y)   INR used for dosin.3 (2024)   Warfarin maintenance plan:  3.75 mg every Sun, Tue, Thu; 7.5 mg all other days   Weekly warfarin total:  41.25 mg   Plan last modified:  Babita Velez, Pharmacy Intern (2024)   Next INR check:  2024   Priority:  Maintenance   Target end date:  Indefinite    Indications    History of aortic valve replacement with metallic valve [Z95.4]                 Anticoagulation Episode Summary       INR check location:      Preferred lab:      Send INR reminders to:   [facility]ALYN BILLS [facility]    Comments:            Anticoagulation Care Providers       Provider Role Specialty Phone number    Kitty Lagunas MD Referring Cardiology [phone]            Clinic Interview:  Patient Findings     Positives:  Change in diet/appetite    Negatives:  Signs/symptoms of thrombosis, Signs/symptoms of bleeding,   Laboratory test error suspected, Change in health, Change in alcohol use,   Change in activity, Upcoming invasive procedure, Emergency department   visit, Upcoming dental procedure, Missed doses, Extra doses, Change in   medications, Hospital admission, Bruising, Other complaints    Comments:  Patient reports he eats trail mix that contains cranberries at   work here and there.      Clinical Outcomes     Negatives:  Major bleeding event, Thromboembolic event,   Anticoagulation-related hospital admission, Anticoagulation-related ED   visit, Anticoagulation-related fatality    Comments:  Patient reports he eats trail mix that contains cranberries at   work here and there.        INR History:      2024     8:15 AM 2024     8:15 AM 3/15/2024     8:00 AM 2024     8:45 AM 2024     8:44 AM 2024     8:30 AM 2024     8:00 AM   Anticoagulation Monitoring   INR 4.3 2.7 4.7 2.0 4.4 3.5 4.3   INR Date 2024 2024 3/15/2024  4/18/2024 5/17/2024 5/30/2024 6/25/2024   INR Goal 2.5-3.5 2.5-3.5 2.5-3.5 2.5-3.5 2.5-3.5 2.5-3.5 2.5-3.5   Trend Same Same Down Up Same Same Down   Last Week Total 43.75 mg 45 mg 45 mg 41.25 mg 45 mg 45 mg 45 mg   Next Week Total 41.25 mg 45 mg 33.75 mg 48.75 mg 37.5 mg 45 mg 41.25 mg   Sun 3.75 mg 3.75 mg 3.75 mg 3.75 mg 3.75 mg 3.75 mg 3.75 mg   Mon 7.5 mg 7.5 mg 7.5 mg 7.5 mg 7.5 mg 7.5 mg 7.5 mg   Tue 7.5 mg 7.5 mg 3.75 mg 7.5 mg 7.5 mg 7.5 mg 3.75 mg   Wed 7.5 mg 7.5 mg 7.5 mg 7.5 mg 7.5 mg 7.5 mg 7.5 mg   Thu Hold (1/4); Otherwise 3.75 mg 3.75 mg 3.75 mg 7.5 mg (4/18); Otherwise 3.75 mg 3.75 mg 3.75 mg 3.75 mg   Fri 7.5 mg 7.5 mg Hold (3/15); Otherwise 7.5 mg 7.5 mg Hold (5/17); Otherwise 7.5 mg 7.5 mg 7.5 mg   Sat 7.5 mg 7.5 mg 7.5 mg 7.5 mg 7.5 mg 7.5 mg 7.5 mg   Visit Report      Report        Plan:  1. INR is Supratherapeutic today- see above in Anticoagulation Summary. He requests to reduce his warfarin dose.   Will instruct Stew Cabral to Change their warfarin regimen (3.75 mg on T/Th/Sun and 7.5 mg all other days) - see above in Anticoagulation Summary.  2. Follow up in 2 weeks. INR has been supratherapeutic on 45 mg/wk and subtherapeutic on 41.25 mg/wk. If INR becomes subtherapeutic following this dose reduction, may consider 6 mg daily (42 mg/wk) or 7.5 mg 1 day/wk, 6 mg all other days (43.5 mg/wk).   3. Patient declines warfarin refills.  4. Verbal and written information provided. Patient expresses understanding and has no further questions at this time.    Babita Velez, Pharmacy Intern

## 2024-07-05 ENCOUNTER — ANTICOAGULATION VISIT (OUTPATIENT)
Dept: PHARMACY | Facility: HOSPITAL | Age: 64
End: 2024-07-05
Payer: COMMERCIAL

## 2024-07-05 ENCOUNTER — HOSPITAL ENCOUNTER (OUTPATIENT)
Dept: CARDIOLOGY | Facility: HOSPITAL | Age: 64
Discharge: HOME | End: 2024-07-05
Admitting: INTERNAL MEDICINE
Payer: COMMERCIAL

## 2024-07-05 VITALS
SYSTOLIC BLOOD PRESSURE: 110 MMHG | WEIGHT: 172 LBS | OXYGEN SATURATION: 97 % | HEART RATE: 58 BPM | DIASTOLIC BLOOD PRESSURE: 60 MMHG | HEIGHT: 69 IN | BODY MASS INDEX: 25.48 KG/M2

## 2024-07-05 DIAGNOSIS — I77.810 ASCENDING AORTA DILATATION: ICD-10-CM

## 2024-07-05 DIAGNOSIS — Z95.4 HISTORY OF AORTIC VALVE REPLACEMENT WITH METALLIC VALVE: ICD-10-CM

## 2024-07-05 DIAGNOSIS — Z95.4 HISTORY OF AORTIC VALVE REPLACEMENT WITH METALLIC VALVE: Primary | ICD-10-CM

## 2024-07-05 LAB
AORTIC ARCH: 2.4 CM
AORTIC DIMENSIONLESS INDEX: 0.6 (DI)
ASCENDING AORTA: 4 CM
BH CV ECHO AV AORTIC VALVE AT ACCEL TIME CALCULATED: NORMAL MSEC
BH CV ECHO MEAS - ACS: 1.78 CM
BH CV ECHO MEAS - AO MAX PG: 25.2 MMHG
BH CV ECHO MEAS - AO MEAN PG: 13 MMHG
BH CV ECHO MEAS - AO ROOT DIAM: 3.1 CM
BH CV ECHO MEAS - AO V2 MAX: 251 CM/SEC
BH CV ECHO MEAS - AO V2 VTI: 54.2 CM
BH CV ECHO MEAS - AT: 134 SEC
BH CV ECHO MEAS - AVA(I,D): 1.79 CM2
BH CV ECHO MEAS - EDV(CUBED): 140.6 ML
BH CV ECHO MEAS - EDV(MOD-SP2): 150 ML
BH CV ECHO MEAS - EDV(MOD-SP4): 142 ML
BH CV ECHO MEAS - EF(MOD-BP): 43.8 %
BH CV ECHO MEAS - EF(MOD-SP2): 49.3 %
BH CV ECHO MEAS - EF(MOD-SP4): 38.7 %
BH CV ECHO MEAS - ESV(CUBED): 36.6 ML
BH CV ECHO MEAS - ESV(MOD-SP2): 76 ML
BH CV ECHO MEAS - ESV(MOD-SP4): 87 ML
BH CV ECHO MEAS - FS: 36.2 %
BH CV ECHO MEAS - IVS/LVPW: 1 CM
BH CV ECHO MEAS - IVSD: 1.1 CM
BH CV ECHO MEAS - LAT PEAK E' VEL: 12.5 CM/SEC
BH CV ECHO MEAS - LV DIASTOLIC VOL/BSA (35-75): 73.3 CM2
BH CV ECHO MEAS - LV MASS(C)D: 220.8 GRAMS
BH CV ECHO MEAS - LV MAX PG: 7.6 MMHG
BH CV ECHO MEAS - LV MEAN PG: 4 MMHG
BH CV ECHO MEAS - LV SYSTOLIC VOL/BSA (12-30): 44.9 CM2
BH CV ECHO MEAS - LV V1 MAX: 138 CM/SEC
BH CV ECHO MEAS - LV V1 VTI: 32.2 CM
BH CV ECHO MEAS - LVIDD: 5.2 CM
BH CV ECHO MEAS - LVIDS: 3.3 CM
BH CV ECHO MEAS - LVOT AREA: 3 CM2
BH CV ECHO MEAS - LVOT DIAM: 1.96 CM
BH CV ECHO MEAS - LVPWD: 1.1 CM
BH CV ECHO MEAS - MED PEAK E' VEL: 8.7 CM/SEC
BH CV ECHO MEAS - MR MAX PG: 45.7 MMHG
BH CV ECHO MEAS - MR MAX VEL: 338 CM/SEC
BH CV ECHO MEAS - MV A DUR: 0.12 SEC
BH CV ECHO MEAS - MV A MAX VEL: 101 CM/SEC
BH CV ECHO MEAS - MV DEC SLOPE: 415.8 CM/SEC2
BH CV ECHO MEAS - MV DEC TIME: 0.22 SEC
BH CV ECHO MEAS - MV E MAX VEL: 100.7 CM/SEC
BH CV ECHO MEAS - MV E/A: 1
BH CV ECHO MEAS - MV MAX PG: 6 MMHG
BH CV ECHO MEAS - MV MEAN PG: 1.84 MMHG
BH CV ECHO MEAS - MV P1/2T: 94.7 MSEC
BH CV ECHO MEAS - MV V2 VTI: 44.3 CM
BH CV ECHO MEAS - MVA(P1/2T): 2.32 CM2
BH CV ECHO MEAS - MVA(VTI): 2.19 CM2
BH CV ECHO MEAS - PA ACC TIME: 0.13 SEC
BH CV ECHO MEAS - PA V2 MAX: 95 CM/SEC
BH CV ECHO MEAS - PI END-D VEL: 108.6 CM/SEC
BH CV ECHO MEAS - PULM A REVS DUR: 0.18 SEC
BH CV ECHO MEAS - PULM A REVS VEL: 30.8 CM/SEC
BH CV ECHO MEAS - PULM DIAS VEL: 56.1 CM/SEC
BH CV ECHO MEAS - PULM S/D: 1.1
BH CV ECHO MEAS - PULM SYS VEL: 61.7 CM/SEC
BH CV ECHO MEAS - QP/QS: 0.4
BH CV ECHO MEAS - RAP SYSTOLE: 3 MMHG
BH CV ECHO MEAS - RV MAX PG: 1.36 MMHG
BH CV ECHO MEAS - RV V1 MAX: 58.3 CM/SEC
BH CV ECHO MEAS - RV V1 VTI: 13 CM
BH CV ECHO MEAS - RVOT DIAM: 1.94 CM
BH CV ECHO MEAS - RVSP: 27 MMHG
BH CV ECHO MEAS - SUP REN AO DIAM: 2 CM
BH CV ECHO MEAS - SV(LVOT): 96.9 ML
BH CV ECHO MEAS - SV(MOD-SP2): 74 ML
BH CV ECHO MEAS - SV(MOD-SP4): 55 ML
BH CV ECHO MEAS - SV(RVOT): 38.7 ML
BH CV ECHO MEAS - SVI(LVOT): 50 ML/M2
BH CV ECHO MEAS - SVI(MOD-SP2): 38.2 ML/M2
BH CV ECHO MEAS - SVI(MOD-SP4): 28.4 ML/M2
BH CV ECHO MEAS - TR MAX PG: 23.9 MMHG
BH CV ECHO MEAS - TR MAX VEL: 244.3 CM/SEC
BH CV ECHO MEASUREMENTS AVERAGE E/E' RATIO: 9.5
BH CV XLRA - RV BASE: 3.3 CM
BH CV XLRA - RV LENGTH: 8.2 CM
BH CV XLRA - RV MID: 2.8 CM
BH CV XLRA - TDI S': 10.4 CM/SEC
INR PPP: 3.5 (ref 0.91–1.09)
LEFT ATRIUM VOLUME INDEX: 27.9 ML/M2
PROTHROMBIN TIME: 42.5 SECONDS (ref 10–13.8)
SINUS: 3.5 CM
STJ: 3.2 CM

## 2024-07-05 PROCEDURE — 36416 COLLJ CAPILLARY BLOOD SPEC: CPT

## 2024-07-05 PROCEDURE — G0463 HOSPITAL OUTPT CLINIC VISIT: HCPCS

## 2024-07-05 PROCEDURE — 25510000001 PERFLUTREN (DEFINITY) 8.476 MG IN SODIUM CHLORIDE (PF) 0.9 % 10 ML INJECTION: Performed by: INTERNAL MEDICINE

## 2024-07-05 PROCEDURE — 85610 PROTHROMBIN TIME: CPT

## 2024-07-05 PROCEDURE — 93306 TTE W/DOPPLER COMPLETE: CPT

## 2024-07-05 RX ORDER — WARFARIN SODIUM 7.5 MG/1
TABLET ORAL
Qty: 75 TABLET | Refills: 1 | Status: SHIPPED | OUTPATIENT
Start: 2024-07-05

## 2024-07-05 RX ADMIN — PERFLUTREN 1.5 ML: 6.52 INJECTION, SUSPENSION INTRAVENOUS at 10:30

## 2024-07-05 NOTE — PROGRESS NOTES
I have supervised and reviewed the notes, assessments, and/or procedures performed. The documented assessment and plan were developed cooperatively, and the plan was implemented in my presence. I concur with the documentation of this patient encounter.    Merlyn Hassan, [facility]

## 2024-07-05 NOTE — PROGRESS NOTES
Anticoagulation Clinic Progress Note    Anticoagulation Summary  As of 7/5/2024      INR goal:  2.5-3.5   TTR:  40.9% (5.8 y)   INR used for dosing:  3.5 (7/5/2024)   Warfarin maintenance plan:  3.75 mg every Sun, Tue, Thu; 7.5 mg all other days   Weekly warfarin total:  41.25 mg   No change documented:  Babita Velez, Pharmacy Intern   Plan last modified:  Babita Velez Pharmacy Intern (6/25/2024)   Next INR check:  7/18/2024   Priority:  Maintenance   Target end date:  Indefinite    Indications    History of aortic valve replacement with metallic valve [Z95.4]                 Anticoagulation Episode Summary       INR check location:      Preferred lab:      Send INR reminders to:  [facility] POOL    Comments:            Anticoagulation Care Providers       Provider Role Specialty Phone number    Kitty Lagunas MD Referring Cardiology [phone]            Clinic Interview:  Patient Findings     Positives:  Upcoming invasive procedure    Negatives:  Signs/symptoms of thrombosis, Signs/symptoms of bleeding,   Laboratory test error suspected, Change in health, Change in alcohol use,   Change in activity, Emergency department visit, Upcoming dental procedure,   Missed doses, Extra doses, Change in medications, Change in diet/appetite,   Hospital admission, Bruising, Other complaints    Comments:  Patient was supposed to have a biopsy for skin on neck a few   months ago, but was not able to be complete due to elevated INR. Patient   plans call Dr. Jason to get that procedure rescheduled.       Clinical Outcomes     Negatives:  Major bleeding event, Thromboembolic event,   Anticoagulation-related hospital admission, Anticoagulation-related ED   visit, Anticoagulation-related fatality    Comments:  Patient was supposed to have a biopsy for skin on neck a few   months ago, but was not able to be complete due to elevated INR. Patient   plans call Dr. Jason to get that procedure rescheduled.          INR History:      2/29/2024     8:15 AM 3/15/2024     8:00 AM 4/18/2024     8:45 AM 5/17/2024     8:44 AM 5/30/2024     8:30 AM 6/25/2024     8:00 AM 7/5/2024     9:30 AM   Anticoagulation Monitoring   INR 2.7 4.7 2.0 4.4 3.5 4.3 3.5   INR Date 2/29/2024 3/15/2024 4/18/2024 5/17/2024 5/30/2024 6/25/2024 7/5/2024   INR Goal 2.5-3.5 2.5-3.5 2.5-3.5 2.5-3.5 2.5-3.5 2.5-3.5 2.5-3.5   Trend Same Down Up Same Same Down Same   Last Week Total 45 mg 45 mg 41.25 mg 45 mg 45 mg 45 mg 41.25 mg   Next Week Total 45 mg 33.75 mg 48.75 mg 37.5 mg 45 mg 41.25 mg 41.25 mg   Sun 3.75 mg 3.75 mg 3.75 mg 3.75 mg 3.75 mg 3.75 mg 3.75 mg   Mon 7.5 mg 7.5 mg 7.5 mg 7.5 mg 7.5 mg 7.5 mg 7.5 mg   Tue 7.5 mg 3.75 mg 7.5 mg 7.5 mg 7.5 mg 3.75 mg 3.75 mg   Wed 7.5 mg 7.5 mg 7.5 mg 7.5 mg 7.5 mg 7.5 mg 7.5 mg   Thu 3.75 mg 3.75 mg 7.5 mg (4/18); Otherwise 3.75 mg 3.75 mg 3.75 mg 3.75 mg 3.75 mg   Fri 7.5 mg Hold (3/15); Otherwise 7.5 mg 7.5 mg Hold (5/17); Otherwise 7.5 mg 7.5 mg 7.5 mg 7.5 mg   Sat 7.5 mg 7.5 mg 7.5 mg 7.5 mg 7.5 mg 7.5 mg 7.5 mg   Visit Report     Report         Plan:  1. INR is Therapeutic today- see above in Anticoagulation Summary.  Will instruct Stew Cabral to Continue their warfarin regimen (3.75 mg on Sun/Tu/Th and 7.5 mg on all other days) - see above in Anticoagulation Summary.  2. Follow up in 2 weeks  3. Patient desires warfarin refills.  4. Verbal and written information provided. Patient expresses understanding and has no further questions at this time.    Babita Velez, Pharmacy Intern

## 2024-07-08 NOTE — PROGRESS NOTES
Called and went over unremarkable echocardiogram results.  Will see him back as scheduled next year.

## 2024-07-31 ENCOUNTER — PATIENT MESSAGE (OUTPATIENT)
Dept: FAMILY MEDICINE CLINIC | Facility: CLINIC | Age: 64
End: 2024-07-31
Payer: COMMERCIAL

## 2024-08-06 ENCOUNTER — ANTICOAGULATION VISIT (OUTPATIENT)
Dept: PHARMACY | Facility: HOSPITAL | Age: 64
End: 2024-08-06
Payer: COMMERCIAL

## 2024-08-06 DIAGNOSIS — Z95.4 HISTORY OF AORTIC VALVE REPLACEMENT WITH METALLIC VALVE: Primary | ICD-10-CM

## 2024-08-06 LAB
INR PPP: 3.3 (ref 0.91–1.09)
PROTHROMBIN TIME: 39.8 SECONDS (ref 10–13.8)

## 2024-08-06 PROCEDURE — 85610 PROTHROMBIN TIME: CPT

## 2024-08-06 PROCEDURE — 36416 COLLJ CAPILLARY BLOOD SPEC: CPT

## 2024-08-06 PROCEDURE — G0463 HOSPITAL OUTPT CLINIC VISIT: HCPCS

## 2024-08-06 NOTE — PROGRESS NOTES
Anticoagulation Clinic Progress Note    Anticoagulation Summary  As of 8/6/2024      INR goal:  2.5-3.5   TTR:  41.8% (5.9 y)   INR used for dosing:  3.3 (8/6/2024)   Warfarin maintenance plan:  3.75 mg every Sun, Tue, Thu; 7.5 mg all other days   Weekly warfarin total:  41.25 mg   No change documented:  Huong Paniagua, Pharmacy Technician   Plan last modified:  Babita Velez, Pharmacy Intern (6/25/2024)   Next INR check:  8/22/2024   Priority:  Maintenance   Target end date:  Indefinite    Indications    History of aortic valve replacement with metallic valve [Z95.4]                 Anticoagulation Episode Summary       INR check location:      Preferred lab:      Send INR reminders to:   [facility]S [facility] [place]    Comments:            Anticoagulation Care Providers       Provider Role Specialty Phone number    Kitty Lagunas MD Referring Cardiology [phone]            Clinic Interview:  Patient Findings     Positives:  Upcoming invasive procedure    Negatives:  Signs/symptoms of thrombosis, Signs/symptoms of bleeding,   Laboratory test error suspected, Change in health, Change in alcohol use,   Change in activity, Emergency department visit, Upcoming dental procedure,   Missed doses, Extra doses, Change in medications, Change in diet/appetite,   Hospital admission, Bruising, Other complaints    Comments:  Reports neck biopsy scheduled for 8/16/24. He reports warfarin   interruption is not required; however, he indicates the INR needs to be   </=3.5.      Clinical Outcomes     Negatives:  Major bleeding event, Thromboembolic event,   Anticoagulation-related hospital admission, Anticoagulation-related ED   visit, Anticoagulation-related fatality    Comments:  Reports neck biopsy scheduled for 8/16/24. He reports warfarin   interruption is not required; however, he indicates the INR needs to be   </=3.5.        INR History:      3/15/2024     8:00 AM 4/18/2024     8:45 AM 5/17/2024     8:44 AM 5/30/2024      8:30 AM 6/25/2024     8:00 AM 7/5/2024     9:30 AM 8/6/2024     8:15 AM   Anticoagulation Monitoring   INR 4.7 2.0 4.4 3.5 4.3 3.5 3.3   INR Date 3/15/2024 4/18/2024 5/17/2024 5/30/2024 6/25/2024 7/5/2024 8/6/2024   INR Goal 2.5-3.5 2.5-3.5 2.5-3.5 2.5-3.5 2.5-3.5 2.5-3.5 2.5-3.5   Trend Down Up Same Same Down Same Same   Last Week Total 45 mg 41.25 mg 45 mg 45 mg 45 mg 41.25 mg 41.25 mg   Next Week Total 33.75 mg 48.75 mg 37.5 mg 45 mg 41.25 mg 41.25 mg 41.25 mg   Sun 3.75 mg 3.75 mg 3.75 mg 3.75 mg 3.75 mg 3.75 mg 3.75 mg   Mon 7.5 mg 7.5 mg 7.5 mg 7.5 mg 7.5 mg 7.5 mg 7.5 mg   Tue 3.75 mg 7.5 mg 7.5 mg 7.5 mg 3.75 mg 3.75 mg 3.75 mg   Wed 7.5 mg 7.5 mg 7.5 mg 7.5 mg 7.5 mg 7.5 mg 7.5 mg   Thu 3.75 mg 7.5 mg (4/18); Otherwise 3.75 mg 3.75 mg 3.75 mg 3.75 mg 3.75 mg 3.75 mg   Fri Hold (3/15); Otherwise 7.5 mg 7.5 mg Hold (5/17); Otherwise 7.5 mg 7.5 mg 7.5 mg 7.5 mg 7.5 mg   Sat 7.5 mg 7.5 mg 7.5 mg 7.5 mg 7.5 mg 7.5 mg 7.5 mg   Visit Report    Report          Plan:  1. INR is Therapeutic today- see above in Anticoagulation Summary.  Will instruct Stew Cabral to Continue their warfarin regimen - see above in Anticoagulation Summary.  2. Follow up in 2 weeks.  3. Patient declines warfarin refills.  4. Verbal and written information provided. Patient expresses understanding and has no further questions at this time.    Gael Turner, PharmD

## 2024-08-15 NOTE — PROGRESS NOTES
08/16/24 0001   Pre-Procedure Phone Call   Procedure Time Verified Yes   Arrival Time 1115  (8/16/2024)   Procedure Location Verified Yes   Medical History Reviewed No   NPO Status Reinforced No  (Able to eat BF.)   Ride and Caregiver Arranged N/A   Phone Number for Ride/Caregiver Self   Patient Knows to Bring Current Medications Yes   Bring Outside Films Requested No  (EPIC: US Head/ Neck - 8/5/2024.)

## 2024-08-16 ENCOUNTER — ANTICOAGULATION VISIT (OUTPATIENT)
Dept: PHARMACY | Facility: HOSPITAL | Age: 64
End: 2024-08-16
Payer: COMMERCIAL

## 2024-08-16 ENCOUNTER — HOSPITAL ENCOUNTER (OUTPATIENT)
Dept: ULTRASOUND IMAGING | Facility: HOSPITAL | Age: 64
Discharge: HOME | End: 2024-08-16
Payer: COMMERCIAL

## 2024-08-16 VITALS
HEIGHT: 69 IN | SYSTOLIC BLOOD PRESSURE: 128 MMHG | RESPIRATION RATE: 18 BRPM | OXYGEN SATURATION: 95 % | BODY MASS INDEX: 25.18 KG/M2 | HEART RATE: 61 BPM | DIASTOLIC BLOOD PRESSURE: 70 MMHG | WEIGHT: 170 LBS

## 2024-08-16 DIAGNOSIS — M79.89 MASS OF SOFT TISSUE OF NECK: ICD-10-CM

## 2024-08-16 DIAGNOSIS — Z95.4 HISTORY OF AORTIC VALVE REPLACEMENT WITH METALLIC VALVE: Primary | ICD-10-CM

## 2024-08-16 LAB
INR PPP: 3 (ref 0.8–1.2)
PROTHROMBIN TIME: 30.8 SECONDS (ref 12.8–15.2)

## 2024-08-16 PROCEDURE — 85610 PROTHROMBIN TIME: CPT

## 2024-08-16 PROCEDURE — 88305 TISSUE EXAM BY PATHOLOGIST: CPT | Performed by: FAMILY MEDICINE

## 2024-08-16 PROCEDURE — 76942 ECHO GUIDE FOR BIOPSY: CPT

## 2024-08-16 PROCEDURE — 25010000002 LIDOCAINE 1 % SOLUTION: Performed by: RADIOLOGY

## 2024-08-16 RX ORDER — SODIUM CHLORIDE 0.9 % (FLUSH) 0.9 %
10 SYRINGE (ML) INJECTION AS NEEDED
Status: DISCONTINUED | OUTPATIENT
Start: 2024-08-16 | End: 2024-08-17 | Stop reason: HOSPADM

## 2024-08-16 RX ORDER — LISDEXAMFETAMINE DIMESYLATE 30 MG/1
30 CAPSULE ORAL EVERY MORNING
COMMUNITY
Start: 2024-07-31

## 2024-08-16 RX ORDER — LIDOCAINE HYDROCHLORIDE 10 MG/ML
10 INJECTION, SOLUTION INFILTRATION; PERINEURAL ONCE
Status: COMPLETED | OUTPATIENT
Start: 2024-08-16 | End: 2024-08-16

## 2024-08-16 RX ADMIN — LIDOCAINE HYDROCHLORIDE 2 ML: 10 INJECTION, SOLUTION INFILTRATION; PERINEURAL at 13:05

## 2024-08-16 NOTE — PROGRESS NOTES
Anticoagulation Clinic Progress Note    Anticoagulation Summary  As of 8/16/2024      INR goal:  2.5-3.5   TTR:  42.1% (5.9 y)   INR used for dosing:  3.0 (8/16/2024)   Warfarin maintenance plan:  3.75 mg every Sun, Tue, Thu; 7.5 mg all other days   Weekly warfarin total:  41.25 mg   No change documented:  Michelle Narvaez, PharmD   Plan last modified:  Babita Velez, Pharmacy Intern (6/25/2024)   Next INR check:  8/22/2024   Priority:  Maintenance   Target end date:  Indefinite    Indications    History of aortic valve replacement with metallic valve [Z95.4]                 Anticoagulation Episode Summary       INR check location:      Preferred lab:      Send INR reminders to:   [facility]ILLS [facility] PO[facility]    Comments:            Anticoagulation Care Providers       Provider Role Specialty Phone number    Kitty Lagunas MD Referring Cardiology [phone]            Clinic Interview:  Patient Findings     Positives:  Upcoming invasive procedure    Negatives:  Signs/symptoms of thrombosis, Signs/symptoms of bleeding,   Laboratory test error suspected, Change in health, Change in alcohol use,   Change in activity, Emergency department visit, Upcoming dental procedure,   Missed doses, Extra doses, Change in medications, Change in diet/appetite,   Hospital admission, Bruising, Other complaints    Comments:  Needle biopsy today.  INR goal was less than 3.5.       Clinical Outcomes     Negatives:  Major bleeding event, Thromboembolic event,   Anticoagulation-related hospital admission, Anticoagulation-related ED   visit, Anticoagulation-related fatality    Comments:  Needle biopsy today.  INR goal was less than 3.5.         INR History:      4/18/2024     8:45 AM 5/17/2024     8:44 AM 5/30/2024     8:30 AM 6/25/2024     8:00 AM 7/5/2024     9:30 AM 8/6/2024     8:15 AM 8/16/2024    12:20 PM   Anticoagulation Monitoring   INR 2.0 4.4 3.5 4.3 3.5 3.3 3.0   INR Date 4/18/2024 5/17/2024 5/30/2024 6/25/2024 7/5/2024  8/6/2024 8/16/2024   INR Goal 2.5-3.5 2.5-3.5 2.5-3.5 2.5-3.5 2.5-3.5 2.5-3.5 2.5-3.5   Trend Up Same Same Down Same Same Same   Last Week Total 41.25 mg 45 mg 45 mg 45 mg 41.25 mg 41.25 mg 41.25 mg   Next Week Total 48.75 mg 37.5 mg 45 mg 41.25 mg 41.25 mg 41.25 mg 41.25 mg   Sun 3.75 mg 3.75 mg 3.75 mg 3.75 mg 3.75 mg 3.75 mg 3.75 mg   Mon 7.5 mg 7.5 mg 7.5 mg 7.5 mg 7.5 mg 7.5 mg 7.5 mg   Tue 7.5 mg 7.5 mg 7.5 mg 3.75 mg 3.75 mg 3.75 mg 3.75 mg   Wed 7.5 mg 7.5 mg 7.5 mg 7.5 mg 7.5 mg 7.5 mg 7.5 mg   Thu 7.5 mg (4/18); Otherwise 3.75 mg 3.75 mg 3.75 mg 3.75 mg 3.75 mg 3.75 mg -   Fri 7.5 mg Hold (5/17); Otherwise 7.5 mg 7.5 mg 7.5 mg 7.5 mg 7.5 mg 7.5 mg   Sat 7.5 mg 7.5 mg 7.5 mg 7.5 mg 7.5 mg 7.5 mg 7.5 mg   Visit Report   Report           Plan:  1. INR is Therapeutic today- see above in Anticoagulation Summary.   Will instruct Stew GABINO Cabral to Continue their warfarin regimen- see above in Anticoagulation Summary.  2. Follow up in 1 weeks  Resulted prior to procedure (biopsy) today.   Michelle Narvaez, [facility]

## 2024-08-16 NOTE — DISCHARGE INSTRUCTIONS
EDUCATION /DISCHARGE INSTRUCTIONS  US guided biopsy:  A biopsy is a procedure done to remove tissue for further analysis.  Before images are taken to locate the target area.  Images can be obtained using ultrasound, CT or MRI.  A physician will clean your skin with antiseptic soap, place a sterile towel around the site and administer a local anesthetic to numb the area.  The physician will then insert a special needle.  Sometimes images are taken of the needle after it is inserted to ensure the needle is in the correct area to be biopsied.   A sample is obtained and sent to the laboratory for study.  Occasionally the laboratory is unable to make a diagnosis from the sample and the procedure may need to be repeated.  Within a week the radiologist will send a report to your physician.  A pathologist will also examine the tissue and send a report.    Risks of the procedure include but are not limited to:   *  Bleeding    *  Infection   *  Puncture of surrounding organs *  Death     *  Lung collapse if the biopsy is near the chest which may require insertion of a      chest tube to re-inflate the lung if severe.    Benefits of the procedure:  Using x-ray helps to locate the area that requires a biopsy. The procedure is less invasive than a surgical procedure, there are no large incisions and it does not require anesthesia.    Alternatives to the procedure:  A biopsy can be performed surgically.  Risks of a surgical biopsy include exposure to anesthesia, infection, excessive bleeding and injury to abdominal organs.  A benefit of surgical biopsy is the ability to see the area to be biopsied and remove of a larger piece of tissue.        Post Procedure:    *  Expect the biopsy site may be tender up to one week.    *  Rest today (no pushing pulling or straining).   *  Slowly increase activity tomorrow.    *  If you received sedation do not drive for 24 hours.   *  Keep dressing clean and dry.   *  Leave dressing on puncture  site for 24 hours.    *  You may shower when dressing removed.  Call your doctor if experiencing:   *  Signs of infection such as redness, swelling, excessive pain and / or foul        smelling drainage from the puncture site.   *  Chills or fever over 101 degrees (by mouth).   *  Unrelieved pain.   *  Any new or severe symptoms.   *  If experiencing sudden / severe shortness of breath or chest pain go to the       nearest emergency room.   Following the procedure:     Follow-up with the ordering physician as directed.    Continue to take other medications as directed by your physician unless    otherwise instructed.   If applicable, resume taking your blood thinners or Aspirin on ___________.    If you have any concerns please call the Radiology Nurses Desk at [phone]. 7AM-10PM   You are the most important factor in your recovery.  Follow the above instructions carefully.

## 2024-08-17 LAB
CYTO UR: NORMAL
LAB AP CASE REPORT: NORMAL
LAB AP DIAGNOSIS COMMENT: NORMAL
PATH REPORT.FINAL DX SPEC: NORMAL
PATH REPORT.GROSS SPEC: NORMAL

## 2024-08-20 DIAGNOSIS — D49.0 PAROTID NEOPLASM: Primary | ICD-10-CM

## 2024-08-22 ENCOUNTER — ANTICOAGULATION VISIT (OUTPATIENT)
Dept: PHARMACY | Facility: HOSPITAL | Age: 64
End: 2024-08-22
Payer: COMMERCIAL

## 2024-08-22 DIAGNOSIS — Z95.4 HISTORY OF AORTIC VALVE REPLACEMENT WITH METALLIC VALVE: Primary | ICD-10-CM

## 2024-08-22 LAB
INR PPP: 2.2 (ref 0.91–1.09)
PROTHROMBIN TIME: 26.9 SECONDS (ref 10–13.8)

## 2024-08-22 PROCEDURE — 36416 COLLJ CAPILLARY BLOOD SPEC: CPT

## 2024-08-22 PROCEDURE — 85610 PROTHROMBIN TIME: CPT

## 2024-08-22 PROCEDURE — G0463 HOSPITAL OUTPT CLINIC VISIT: HCPCS

## 2024-08-22 NOTE — PROGRESS NOTES
I have supervised and reviewed the notes, assessments, and/or procedures performed. The documented assessment and plan were developed cooperatively, and the plan was implemented in my presence. I concur with the documentation of this patient encounter.    Merlyn Hassan, [facility]

## 2024-08-22 NOTE — PROGRESS NOTES
Anticoagulation Clinic Progress Note    Anticoagulation Summary  As of 2024      INR goal:  2.5-3.5   TTR:  42.1% (5.9 y)   INR used for dosin.2 (2024)   Warfarin maintenance plan:  3.75 mg every Sun, Tue, Thu; 7.5 mg all other days   Weekly warfarin total:  41.25 mg   Plan last modified:  Babita Velez, Pharmacy Intern (2024)   Next INR check:  2024   Priority:  Maintenance   Target end date:  Indefinite    Indications    History of aortic valve replacement with metallic valve [Z95.4]                 Anticoagulation Episode Summary       INR check location:      Preferred lab:      Send INR reminders to:  [facility]    Comments:            Anticoagulation Care Providers       Provider Role Specialty Phone number    Kitty Lagunas MD Referring Cardiology [phone]            Clinic Interview:  Patient Findings     Positives:  Missed doses    Negatives:  Signs/symptoms of thrombosis, Signs/symptoms of bleeding,   Laboratory test error suspected, Change in health, Change in alcohol use,   Change in activity, Upcoming invasive procedure, Emergency department   visit, Upcoming dental procedure, Extra doses, Change in medications,   Change in diet/appetite, Hospital admission, Bruising, Other complaints    Comments:  Patient reported he took 1/2 of his dose last Thursday (8/15)   to ensure his INR was in range for his procedure      Clinical Outcomes     Negatives:  Major bleeding event, Thromboembolic event,   Anticoagulation-related hospital admission, Anticoagulation-related ED   visit, Anticoagulation-related fatality    Comments:  Patient reported he took 1/2 of his dose last Thursday (8/15)   to ensure his INR was in range for his procedure        INR History:      2024     8:44 AM 2024     8:30 AM 2024     8:00 AM 2024     9:30 AM 2024     8:15 AM 2024    12:20 PM 2024     8:15 AM   Anticoagulation Monitoring   INR 4.4 3.5 4.3 3.5  3.3 3.0 2.2   INR Date 5/17/2024 5/30/2024 6/25/2024 7/5/2024 8/6/2024 8/16/2024 8/22/2024   INR Goal 2.5-3.5 2.5-3.5 2.5-3.5 2.5-3.5 2.5-3.5 2.5-3.5 2.5-3.5   Trend Same Same Down Same Same Same Same   Last Week Total 45 mg 45 mg 45 mg 41.25 mg 41.25 mg 39.375 mg 39.375 mg   Next Week Total 37.5 mg 45 mg 41.25 mg 41.25 mg 41.25 mg 41.25 mg 45 mg   Sun 3.75 mg 3.75 mg 3.75 mg 3.75 mg 3.75 mg 3.75 mg 3.75 mg   Mon 7.5 mg 7.5 mg 7.5 mg 7.5 mg 7.5 mg 7.5 mg 7.5 mg   Tue 7.5 mg 7.5 mg 3.75 mg 3.75 mg 3.75 mg 3.75 mg 3.75 mg   Wed 7.5 mg 7.5 mg 7.5 mg 7.5 mg 7.5 mg 7.5 mg 7.5 mg   Thu 3.75 mg 3.75 mg 3.75 mg 3.75 mg 3.75 mg - 7.5 mg (8/22); Otherwise 3.75 mg   Fri Hold (5/17); Otherwise 7.5 mg 7.5 mg 7.5 mg 7.5 mg 7.5 mg 7.5 mg 7.5 mg   Sat 7.5 mg 7.5 mg 7.5 mg 7.5 mg 7.5 mg 7.5 mg 7.5 mg   Visit Report  Report            Plan:  1. INR is Subtherapeutic today- see above in Anticoagulation Summary.   Will instruct Stew GABINO Cabral to Change their warfarin regimen- see above in Anticoagulation Summary.  Boost to 7.5 mg today then resume normal dosing of 3.75 mg Sunday/Tuesday/Thursday and 7.5 mg all other days   2. Follow up in 2 weeks  3. They have been instructed to call if any changes in medications, doses, concerns, etc. Patient expresses understanding and has no further questions at this time.    Bety Mullen, Pharmacy Intern

## 2024-09-25 RX ORDER — WARFARIN SODIUM 7.5 MG/1
TABLET ORAL
Qty: 75 TABLET | Refills: 1 | Status: SHIPPED | OUTPATIENT
Start: 2024-09-25

## 2024-09-26 ENCOUNTER — ANTICOAGULATION VISIT (OUTPATIENT)
Dept: PHARMACY | Facility: HOSPITAL | Age: 64
End: 2024-09-26
Payer: COMMERCIAL

## 2024-09-26 DIAGNOSIS — Z95.4 HISTORY OF AORTIC VALVE REPLACEMENT WITH METALLIC VALVE: Primary | ICD-10-CM

## 2024-09-26 LAB
INR PPP: 2.3 (ref 0.91–1.09)
PROTHROMBIN TIME: 27.6 SECONDS (ref 10–13.8)

## 2024-09-26 PROCEDURE — G0463 HOSPITAL OUTPT CLINIC VISIT: HCPCS

## 2024-09-26 PROCEDURE — 36416 COLLJ CAPILLARY BLOOD SPEC: CPT

## 2024-09-26 PROCEDURE — 85610 PROTHROMBIN TIME: CPT

## 2024-10-15 ENCOUNTER — TELEPHONE (OUTPATIENT)
Dept: PHARMACY | Facility: HOSPITAL | Age: 64
End: 2024-10-15
Payer: COMMERCIAL

## 2024-10-15 NOTE — TELEPHONE ENCOUNTER
Attempted to reach patient to reschedule missed INR appointment last week. No answer, left voicemail requesting he call us back to get scheduled.

## 2024-10-22 ENCOUNTER — ANTICOAGULATION VISIT (OUTPATIENT)
Dept: PHARMACY | Facility: HOSPITAL | Age: 64
End: 2024-10-22
Payer: COMMERCIAL

## 2024-10-22 DIAGNOSIS — Z95.4 HISTORY OF AORTIC VALVE REPLACEMENT WITH METALLIC VALVE: Primary | ICD-10-CM

## 2024-10-22 DIAGNOSIS — R73.03 PREDIABETES: ICD-10-CM

## 2024-10-22 LAB
INR PPP: 3.7 (ref 0.91–1.09)
PROTHROMBIN TIME: 44.8 SECONDS (ref 10–13.8)

## 2024-10-22 PROCEDURE — 36416 COLLJ CAPILLARY BLOOD SPEC: CPT

## 2024-10-22 PROCEDURE — 85610 PROTHROMBIN TIME: CPT

## 2024-10-22 PROCEDURE — G0463 HOSPITAL OUTPT CLINIC VISIT: HCPCS

## 2024-10-22 NOTE — PROGRESS NOTES
Anticoagulation Clinic Progress Note    Anticoagulation Summary  As of 10/22/2024      INR goal:  2.5-3.5   TTR:  41.8% (6.1 y)   INR used for dosing:  3.7 (10/22/2024)   Warfarin maintenance plan:  3.75 mg every Sun, Tue, Thu; 7.5 mg all other days   Weekly warfarin total:  41.25 mg   No change documented:  Gael Turner, PharmD   Plan last modified:  Babita Velez, Pharmacy Intern (6/25/2024)   Next INR check:  11/7/2024   Priority:  Maintenance   Target end date:  Indefinite    Indications    History of aortic valve replacement with metallic valve [Z95.4]                 Anticoagulation Episode Summary       INR check location:  --    Preferred lab:  --    Send INR reminders to:   [facility] [place]    Comments:  --          Anticoagulation Care Providers       Provider Role Specialty Phone number    Kitty Lagunas MD Referring Cardiology [phone]            Clinic Interview:  Patient Findings     Negatives:  Signs/symptoms of thrombosis, Signs/symptoms of bleeding,   Laboratory test error suspected, Change in health, Change in alcohol use,   Change in activity, Upcoming invasive procedure, Emergency department   visit, Upcoming dental procedure, Missed doses, Extra doses, Change in   medications, Change in diet/appetite, Hospital admission, Bruising, Other   complaints    Comments:  Reports he usually takes his warfarin at 5 pm Mon-Fri before   going into work. However, on weekends he sleeps later, and he will   occasionally not take his warfarin dose until 10 pm.       Clinical Outcomes     Negatives:  Major bleeding event, Thromboembolic event,   Anticoagulation-related hospital admission, Anticoagulation-related ED   visit, Anticoagulation-related fatality    Comments:  Reports he usually takes his warfarin at 5 pm Mon-Fri before   going into work. However, on weekends he sleeps later, and he will   occasionally not take his warfarin dose until 10 pm.         INR History:      6/25/2024      8:00 AM 7/5/2024     9:30 AM 8/6/2024     8:15 AM 8/16/2024    12:20 PM 8/22/2024     8:15 AM 9/26/2024     8:15 AM 10/22/2024     8:30 AM   Anticoagulation Monitoring   INR 4.3 3.5 3.3 3.0 2.2 2.3 3.7   INR Date 6/25/2024 7/5/2024 8/6/2024 8/16/2024 8/22/2024 9/26/2024 10/22/2024   INR Goal 2.5-3.5 2.5-3.5 2.5-3.5 2.5-3.5 2.5-3.5 2.5-3.5 2.5-3.5   Trend Down Same Same Same Same Same Same   Last Week Total 45 mg 41.25 mg 41.25 mg 39.375 mg 39.375 mg 33.75 mg 41.25 mg   Next Week Total 41.25 mg 41.25 mg 41.25 mg 41.25 mg 45 mg 45 mg 41.25 mg   Sun 3.75 mg 3.75 mg 3.75 mg 3.75 mg 3.75 mg 3.75 mg 3.75 mg   Mon 7.5 mg 7.5 mg 7.5 mg 7.5 mg 7.5 mg 7.5 mg 7.5 mg   Tue 3.75 mg 3.75 mg 3.75 mg 3.75 mg 3.75 mg 3.75 mg 3.75 mg   Wed 7.5 mg 7.5 mg 7.5 mg 7.5 mg 7.5 mg 7.5 mg 7.5 mg   Thu 3.75 mg 3.75 mg 3.75 mg - 7.5 mg (8/22); Otherwise 3.75 mg 7.5 mg (9/26); Otherwise 3.75 mg 3.75 mg   Fri 7.5 mg 7.5 mg 7.5 mg 7.5 mg 7.5 mg 7.5 mg 7.5 mg   Sat 7.5 mg 7.5 mg 7.5 mg 7.5 mg 7.5 mg 7.5 mg 7.5 mg       Plan:  1. INR is Supratherapeutic today- see above in Anticoagulation Summary.  Will instruct Stew Cabral to Continue their warfarin regimen - see above in Anticoagulation Summary.  2. Follow up in 2 weeks. Entered standing INR order in case it is more convenient for him to have INR in 6 am hour at outpatient lab on way home from work.   3. Patient declines warfarin refills.  4. Verbal and written information provided. Patient expresses understanding and has no further questions at this time.    Gael Turner, PharmD

## 2024-10-23 NOTE — TELEPHONE ENCOUNTER
Rx Refill Note  Requested Prescriptions     Pending Prescriptions Disp Refills    metFORMIN ER (GLUCOPHAGE-XR) 500 MG 24 hr tablet [Pharmacy Med Name: METFORMIN ER 500MG 24HR TABS] 90 tablet 3     Sig: TAKE 2 TABLETS BY MOUTH DAILY WITH BREAKFAST      Last office visit with prescribing clinician: 4/25/2024   Next office visit with prescribing clinician: Visit date not found     Nathan Russ MA  10/23/24, 07:58 EDT

## 2024-10-24 RX ORDER — METFORMIN HCL 500 MG
1000 TABLET, EXTENDED RELEASE 24 HR ORAL
Qty: 90 TABLET | Refills: 3 | Status: SHIPPED | OUTPATIENT
Start: 2024-10-24

## 2024-12-05 ENCOUNTER — ANTICOAGULATION VISIT (OUTPATIENT)
Dept: PHARMACY | Facility: HOSPITAL | Age: 64
End: 2024-12-05
Payer: COMMERCIAL

## 2024-12-05 DIAGNOSIS — Z95.4 HISTORY OF AORTIC VALVE REPLACEMENT WITH METALLIC VALVE: Primary | ICD-10-CM

## 2024-12-05 LAB
INR PPP: 4.1 (ref 0.91–1.09)
PROTHROMBIN TIME: 49.7 SECONDS (ref 10–13.8)

## 2024-12-05 PROCEDURE — 36416 COLLJ CAPILLARY BLOOD SPEC: CPT

## 2024-12-05 PROCEDURE — 85610 PROTHROMBIN TIME: CPT

## 2024-12-05 PROCEDURE — G0463 HOSPITAL OUTPT CLINIC VISIT: HCPCS

## 2024-12-05 NOTE — PROGRESS NOTES
Anticoagulation Clinic Progress Note    Anticoagulation Summary  As of 2024      INR goal:  2.5-3.5   TTR:  41.0% (6.2 y)   INR used for dosin.1 (2024)   Warfarin maintenance plan:  7.5 mg every Mon, Wed, Fri; 3.75 mg all other days   Weekly warfarin total:  37.5 mg   Plan last modified:  Gael Turner, PharmD (2024)   Next INR check:  2024   Priority:  Maintenance   Target end date:  Indefinite    Indications    History of aortic valve replacement with metallic valve [Z95.4]                 Anticoagulation Episode Summary       INR check location:  --    Preferred lab:  --    Send INR reminders to:   [facility]    Comments:  --          Anticoagulation Care Providers       Provider Role Specialty Phone number    Kitty Lagunas MD Referring Cardiology [phone]            Clinic Interview:  Patient Findings     Positives:  Change in health, Upcoming dental procedure, Missed doses,   Extra doses, Change in medications, Change in diet/appetite    Negatives:  Signs/symptoms of thrombosis, Signs/symptoms of bleeding,   Laboratory test error suspected, Change in alcohol use, Change in   activity, Upcoming invasive procedure, Emergency department visit,   Hospital admission, Bruising, Other complaints    Comments:  He reports he missed his 3.75-mg warfarin dose on 24, so   he took 7.5 mg on 12/3/24 rather than 3.75 mg. Possible dental extraction   (3 teeth) next week. He plans to call the oral surgeon to determine if   warfarin interruption is required. Food intake is slightly reduced due to   tooth pain. Reports he took ibuprofen this morning, which he very rarely   takes because of the risk of bleeding.       Clinical Outcomes     Negatives:  Major bleeding event, Thromboembolic event,   Anticoagulation-related hospital admission, Anticoagulation-related ED   visit, Anticoagulation-related fatality    Comments:  He reports he missed his 3.75-mg warfarin dose on  12/1/24, so   he took 7.5 mg on 12/3/24 rather than 3.75 mg. Possible dental extraction   (3 teeth) next week. He plans to call the oral surgeon to determine if   warfarin interruption is required. Food intake is slightly reduced due to   tooth pain. Reports he took ibuprofen this morning, which he very rarely   takes because of the risk of bleeding.         INR History:      7/5/2024     9:30 AM 8/6/2024     8:15 AM 8/16/2024    12:20 PM 8/22/2024     8:15 AM 9/26/2024     8:15 AM 10/22/2024     8:30 AM 12/5/2024     8:45 AM   Anticoagulation Monitoring   INR 3.5 3.3 3.0 2.2 2.3 3.7 4.1   INR Date 7/5/2024 8/6/2024 8/16/2024 8/22/2024 9/26/2024 10/22/2024 12/5/2024   INR Goal 2.5-3.5 2.5-3.5 2.5-3.5 2.5-3.5 2.5-3.5 2.5-3.5 2.5-3.5   Trend Same Same Same Same Same Same Down   Last Week Total 41.25 mg 41.25 mg 39.375 mg 39.375 mg 33.75 mg 41.25 mg 41.25 mg   Next Week Total 41.25 mg 41.25 mg 41.25 mg 45 mg 45 mg 41.25 mg 37.5 mg   Sun 3.75 mg 3.75 mg 3.75 mg 3.75 mg 3.75 mg 3.75 mg 3.75 mg   Mon 7.5 mg 7.5 mg 7.5 mg 7.5 mg 7.5 mg 7.5 mg 7.5 mg   Tue 3.75 mg 3.75 mg 3.75 mg 3.75 mg 3.75 mg 3.75 mg 3.75 mg   Wed 7.5 mg 7.5 mg 7.5 mg 7.5 mg 7.5 mg 7.5 mg 7.5 mg   Thu 3.75 mg 3.75 mg - 7.5 mg (8/22); Otherwise 3.75 mg 7.5 mg (9/26); Otherwise 3.75 mg 3.75 mg 3.75 mg   Fri 7.5 mg 7.5 mg 7.5 mg 7.5 mg 7.5 mg 7.5 mg 7.5 mg   Sat 7.5 mg 7.5 mg 7.5 mg 7.5 mg 7.5 mg 7.5 mg 3.75 mg       Plan:  1. INR is Supratherapeutic today- see above in Anticoagulation Summary.  Will instruct Stew Cabral to Change their warfarin regimen to 7.5 mg MWF, 3.75 mg all other days - see above in Anticoagulation Summary.  2. Follow up in 2 weeks. He will call clinic with recommendation from oral surgeon.   3. Patient declines warfarin refills.  4. Verbal and written information provided. Patient expresses understanding and has no further questions at this time.    Gael Turner, PharmD

## 2025-01-02 RX ORDER — WARFARIN SODIUM 7.5 MG/1
TABLET ORAL
Qty: 75 TABLET | Refills: 1 | Status: SHIPPED | OUTPATIENT
Start: 2025-01-02

## 2025-01-16 ENCOUNTER — ANTICOAGULATION VISIT (OUTPATIENT)
Dept: PHARMACY | Facility: HOSPITAL | Age: 65
End: 2025-01-16
Payer: COMMERCIAL

## 2025-01-16 DIAGNOSIS — Z95.4 HISTORY OF AORTIC VALVE REPLACEMENT WITH METALLIC VALVE: Primary | ICD-10-CM

## 2025-01-16 LAB
INR PPP: 1.8 (ref 0.91–1.09)
PROTHROMBIN TIME: 21.4 SECONDS (ref 10–13.8)

## 2025-01-16 PROCEDURE — 85610 PROTHROMBIN TIME: CPT

## 2025-01-16 PROCEDURE — 36416 COLLJ CAPILLARY BLOOD SPEC: CPT

## 2025-01-16 PROCEDURE — G0463 HOSPITAL OUTPT CLINIC VISIT: HCPCS

## 2025-01-16 RX ORDER — WARFARIN SODIUM 7.5 MG/1
TABLET ORAL
Qty: 75 TABLET | Refills: 1 | Status: SHIPPED | OUTPATIENT
Start: 2025-01-16

## 2025-01-16 NOTE — PROGRESS NOTES
Anticoagulation Clinic Progress Note    Anticoagulation Summary  As of 2025      INR goal:  2.5-3.5   TTR:  41.0% (6.3 y)   INR used for dosin.8 (2025)   Warfarin maintenance plan:  7.5 mg every Mon, Wed, Fri; 3.75 mg all other days   Weekly warfarin total:  37.5 mg   Plan last modified:  Gael Turner, PharmD (2024)   Next INR check:  2025   Priority:  Maintenance   Target end date:  Indefinite    Indications    History of aortic valve replacement with metallic valve [Z95.4]                 Anticoagulation Episode Summary       INR check location:  --    Preferred lab:  --    Send INR reminders to:   [facility]    Comments:  --          Anticoagulation Care Providers       Provider Role Specialty Phone number    Kitty Lagunas MD Referring Cardiology [phone]            Clinic Interview:  Patient Findings     Positives:  Missed doses    Negatives:  Signs/symptoms of thrombosis, Signs/symptoms of bleeding,   Laboratory test error suspected, Change in health, Change in alcohol use,   Change in activity, Upcoming invasive procedure, Emergency department   visit, Upcoming dental procedure, Extra doses, Change in medications,   Change in diet/appetite, Hospital admission, Bruising, Other complaints    Comments:  potentially missed a dose      Clinical Outcomes     Negatives:  Major bleeding event, Thromboembolic event,   Anticoagulation-related hospital admission, Anticoagulation-related ED   visit, Anticoagulation-related fatality    Comments:  potentially missed a dose        INR History:      2024     8:15 AM 2024    12:20 PM 2024     8:15 AM 2024     8:15 AM 10/22/2024     8:30 AM 2024     8:45 AM 2025     8:30 AM   Anticoagulation Monitoring   INR 3.3 3.0 2.2 2.3 3.7 4.1 1.8   INR Date 2024 2024 2024 2024 10/22/2024 2024 2025   INR Goal 2.5-3.5 2.5-3.5 2.5-3.5 2.5-3.5 2.5-3.5 2.5-3.5 2.5-3.5   Trend Same Same  Same Same Same Down Same   Last Week Total 41.25 mg 39.375 mg 39.375 mg 33.75 mg 41.25 mg 41.25 mg 37.5 mg   Next Week Total 41.25 mg 41.25 mg 45 mg 45 mg 41.25 mg 37.5 mg 41.25 mg   Sun 3.75 mg 3.75 mg 3.75 mg 3.75 mg 3.75 mg 3.75 mg 3.75 mg   Mon 7.5 mg 7.5 mg 7.5 mg 7.5 mg 7.5 mg 7.5 mg 7.5 mg   Tue 3.75 mg 3.75 mg 3.75 mg 3.75 mg 3.75 mg 3.75 mg 3.75 mg   Wed 7.5 mg 7.5 mg 7.5 mg 7.5 mg 7.5 mg 7.5 mg 7.5 mg   Thu 3.75 mg - 7.5 mg (8/22); Otherwise 3.75 mg 7.5 mg (9/26); Otherwise 3.75 mg 3.75 mg 3.75 mg 7.5 mg (1/16); Otherwise 3.75 mg   Fri 7.5 mg 7.5 mg 7.5 mg 7.5 mg 7.5 mg 7.5 mg 7.5 mg   Sat 7.5 mg 7.5 mg 7.5 mg 7.5 mg 7.5 mg 3.75 mg 3.75 mg       Plan:  1. INR is Subtherapeutic today- see above in Anticoagulation Summary.  Will instruct Stew Cabral to Increase their warfarin regimen- see above in Anticoagulation Summary.  Boost to 7.5 mg then resume 7.5 mg MWF, 3.75 mg AOD, rck 2 weeks   2. Follow up in 2 weeks  3. Patient declines warfarin refills.  4. Verbal and written information provided. Patient expresses understanding and has no further questions at this time.    Merlyn Hassan, [facility]

## 2025-01-30 ENCOUNTER — ANTICOAGULATION VISIT (OUTPATIENT)
Dept: PHARMACY | Facility: HOSPITAL | Age: 65
End: 2025-01-30
Payer: COMMERCIAL

## 2025-01-30 DIAGNOSIS — Z95.4 HISTORY OF AORTIC VALVE REPLACEMENT WITH METALLIC VALVE: Primary | ICD-10-CM

## 2025-01-30 LAB
INR PPP: 3.4 (ref 0.91–1.09)
PROTHROMBIN TIME: 40.4 SECONDS (ref 10–13.8)

## 2025-01-30 PROCEDURE — G0463 HOSPITAL OUTPT CLINIC VISIT: HCPCS

## 2025-01-30 PROCEDURE — 85610 PROTHROMBIN TIME: CPT

## 2025-01-30 NOTE — PROGRESS NOTES
Anticoagulation Clinic Progress Note    Anticoagulation Summary  As of 1/30/2025      INR goal:  2.5-3.5   TTR:  41.1% (6.4 y)   INR used for dosing:  3.4 (1/30/2025)   Warfarin maintenance plan:  7.5 mg every Mon, Wed, Fri; 3.75 mg all other days   Weekly warfarin total:  37.5 mg   No change documented:  Sue Woodruff   Plan last modified:  Gael Turner, PharmD (12/5/2024)   Next INR check:  2/13/2025   Priority:  Maintenance   Target end date:  Indefinite    Indications    History of aortic valve replacement with metallic valve [Z95.4]                 Anticoagulation Episode Summary       INR check location:  --    Preferred lab:  --    Send INR reminders to:  [facility]    Comments:  --          Anticoagulation Care Providers       Provider Role Specialty Phone number    Kitty Lagunas MD Referring Cardiology [phone]            Clinic Interview:  Patient Findings     Positives:  Upcoming dental procedure    Negatives:  Signs/symptoms of thrombosis, Signs/symptoms of bleeding,   Laboratory test error suspected, Change in health, Change in alcohol use,   Change in activity, Upcoming invasive procedure, Emergency department   visit, Missed doses, Extra doses, Change in medications, Change in   diet/appetite, Hospital admission, Bruising, Other complaints    Comments:  Patient reported up coming dental procedure(3 extractions) not   scheduled, oral surgeon wants INR 3.0 or lower      Clinical Outcomes     Negatives:  Major bleeding event, Thromboembolic event,   Anticoagulation-related hospital admission, Anticoagulation-related ED   visit, Anticoagulation-related fatality    Comments:  Patient reported up coming dental procedure(3 extractions) not   scheduled, oral surgeon wants INR 3.0 or lower        INR History:      8/16/2024    12:20 PM 8/22/2024     8:15 AM 9/26/2024     8:15 AM 10/22/2024     8:30 AM 12/5/2024     8:45 AM 1/16/2025     8:30 AM 1/30/2025     8:00 AM    Anticoagulation Monitoring   INR 3.0 2.2 2.3 3.7 4.1 1.8 3.4   INR Date 8/16/2024 8/22/2024 9/26/2024 10/22/2024 12/5/2024 1/16/2025 1/30/2025   INR Goal 2.5-3.5 2.5-3.5 2.5-3.5 2.5-3.5 2.5-3.5 2.5-3.5 2.5-3.5   Trend Same Same Same Same Down Same Same   Last Week Total 39.375 mg 39.375 mg 33.75 mg 41.25 mg 41.25 mg 37.5 mg 37.5 mg   Next Week Total 41.25 mg 45 mg 45 mg 41.25 mg 37.5 mg 41.25 mg 37.5 mg   Sun 3.75 mg 3.75 mg 3.75 mg 3.75 mg 3.75 mg 3.75 mg 3.75 mg   Mon 7.5 mg 7.5 mg 7.5 mg 7.5 mg 7.5 mg 7.5 mg 7.5 mg   Tue 3.75 mg 3.75 mg 3.75 mg 3.75 mg 3.75 mg 3.75 mg 3.75 mg   Wed 7.5 mg 7.5 mg 7.5 mg 7.5 mg 7.5 mg 7.5 mg 7.5 mg   Thu - 7.5 mg (8/22); Otherwise 3.75 mg 7.5 mg (9/26); Otherwise 3.75 mg 3.75 mg 3.75 mg 7.5 mg (1/16); Otherwise 3.75 mg 3.75 mg   Fri 7.5 mg 7.5 mg 7.5 mg 7.5 mg 7.5 mg 7.5 mg 7.5 mg   Sat 7.5 mg 7.5 mg 7.5 mg 7.5 mg 3.75 mg 3.75 mg 3.75 mg       Plan:  1. INR is therapeutic today- see above in Anticoagulation Summary.   Will instruct Stew GABINO Misha to continue their warfarin regimen- see above in Anticoagulation Summary.  2. Follow up in 2 weeks.  3. Patient declines warfarin refills.  4. Verbal and written information provided. Patient expresses understanding and has no further questions at this time.    Sue Woodruff

## 2025-01-30 NOTE — PROGRESS NOTES
I have supervised and reviewed the notes, assessments, and/or procedures performed. I personally performed the assessment and implemented the plan. I concur with the documentation of this patient encounter.    Gael Turner, PharmD

## 2025-02-17 ENCOUNTER — TELEPHONE (OUTPATIENT)
Dept: PHARMACY | Facility: HOSPITAL | Age: 65
End: 2025-02-17
Payer: COMMERCIAL

## 2025-02-17 RX ORDER — AMOXICILLIN 500 MG/1
2000 CAPSULE ORAL SEE ADMIN INSTRUCTIONS
Qty: 12 CAPSULE | Refills: 0 | Status: SHIPPED | OUTPATIENT
Start: 2025-02-17

## 2025-02-17 NOTE — TELEPHONE ENCOUNTER
----- Message from Montse Pratt sent at 2/17/2025 12:12 PM EST -----  This patient is going to be holding warfarin for 3-4 days prior to teeth extraction and will need Lovenox bridging for his mechanical valve. Just wanted to let you know. Looks like he isn't the most compliant with coming to his AC visits.

## 2025-02-17 NOTE — TELEPHONE ENCOUNTER
Spoke with Mr. Cabral by phone. He reports the procedure has not yet been scheduled. He is overdue for INR check. He agreed to schedule INR check on 2/20/25 at 8:30 am.

## 2025-02-18 ENCOUNTER — TELEPHONE (OUTPATIENT)
Dept: CARDIOLOGY | Facility: CLINIC | Age: 65
End: 2025-02-18
Payer: COMMERCIAL

## 2025-02-18 NOTE — TELEPHONE ENCOUNTER
Received Vm from Dr. Cabral dental surgery requesting if patient has been cleared ?     Their is a letter created yesterday clears patient for surgery if so ill fax clearance over to [phone] .    Please advise patient can be reached back at [phone].

## 2025-02-20 ENCOUNTER — ANTICOAGULATION VISIT (OUTPATIENT)
Dept: PHARMACY | Facility: HOSPITAL | Age: 65
End: 2025-02-20
Payer: COMMERCIAL

## 2025-02-20 DIAGNOSIS — Z95.4 HISTORY OF AORTIC VALVE REPLACEMENT WITH METALLIC VALVE: Primary | ICD-10-CM

## 2025-02-20 LAB
INR PPP: 2.7 (ref 0.91–1.09)
PROTHROMBIN TIME: 32.7 SECONDS (ref 10–13.8)

## 2025-02-20 PROCEDURE — 85610 PROTHROMBIN TIME: CPT

## 2025-02-20 PROCEDURE — G0463 HOSPITAL OUTPT CLINIC VISIT: HCPCS

## 2025-02-20 NOTE — PROGRESS NOTES
I have supervised and reviewed the notes, assessments, and/or procedures performed. I personally performed the assessment and implemented the plan. I concur with the documentation of this patient encounter.    Merlyn Hassan, [facility]

## 2025-02-20 NOTE — PROGRESS NOTES
Anticoagulation Clinic Progress Note    Anticoagulation Summary  As of 2025      INR goal:  2.5-3.5   TTR:  41.7% (6.4 y)   INR used for dosin.7 (2025)   Warfarin maintenance plan:  7.5 mg every Mon, Wed, Fri; 3.75 mg all other days   Weekly warfarin total:  37.5 mg   No change documented:  Sue Woodruff   Plan last modified:  Gael Turner, PharmD (2024)   Next INR check:  3/20/2025   Priority:  Maintenance   Target end date:  Indefinite    Indications    History of aortic valve replacement with metallic valve [Z95.4]                 Anticoagulation Episode Summary       INR check location:  --    Preferred lab:  --    Send INR reminders to:  [facility]    Comments:  --          Anticoagulation Care Providers       Provider Role Specialty Phone number    Kitty Lagunas MD Referring Cardiology [phone]            Clinic Interview:  Patient Findings     Negatives:  Signs/symptoms of thrombosis, Signs/symptoms of bleeding,   Laboratory test error suspected, Change in health, Change in alcohol use,   Change in activity, Upcoming invasive procedure, Emergency department   visit, Upcoming dental procedure, Missed doses, Extra doses, Change in   medications, Change in diet/appetite, Hospital admission, Bruising, Other   complaints      Clinical Outcomes     Negatives:  Major bleeding event, Thromboembolic event,   Anticoagulation-related hospital admission, Anticoagulation-related ED   visit, Anticoagulation-related fatality        INR History:      2024     8:15 AM 2024     8:15 AM 10/22/2024     8:30 AM 2024     8:45 AM 2025     8:30 AM 2025     8:00 AM 2025     8:30 AM   Anticoagulation Monitoring   INR 2.2 2.3 3.7 4.1 1.8 3.4 2.7   INR Date 2024 2024 10/22/2024 2024 2025 2025 2025   INR Goal 2.5-3.5 2.5-3.5 2.5-3.5 2.5-3.5 2.5-3.5 2.5-3.5 2.5-3.5   Trend Same Same Same Down Same Same Same   Last Week  Total 39.375 mg 33.75 mg 41.25 mg 41.25 mg 37.5 mg 37.5 mg 37.5 mg   Next Week Total 45 mg 45 mg 41.25 mg 37.5 mg 41.25 mg 37.5 mg 37.5 mg   Sun 3.75 mg 3.75 mg 3.75 mg 3.75 mg 3.75 mg 3.75 mg 3.75 mg   Mon 7.5 mg 7.5 mg 7.5 mg 7.5 mg 7.5 mg 7.5 mg 7.5 mg   Tue 3.75 mg 3.75 mg 3.75 mg 3.75 mg 3.75 mg 3.75 mg 3.75 mg   Wed 7.5 mg 7.5 mg 7.5 mg 7.5 mg 7.5 mg 7.5 mg 7.5 mg   Thu 7.5 mg (8/22); Otherwise 3.75 mg 7.5 mg (9/26); Otherwise 3.75 mg 3.75 mg 3.75 mg 7.5 mg (1/16); Otherwise 3.75 mg 3.75 mg 3.75 mg   Fri 7.5 mg 7.5 mg 7.5 mg 7.5 mg 7.5 mg 7.5 mg 7.5 mg   Sat 7.5 mg 7.5 mg 7.5 mg 3.75 mg 3.75 mg 3.75 mg 3.75 mg       Plan:  1. INR is therapeutic today- see above in Anticoagulation Summary.   Will instruct Stew Cabral to continue their warfarin regimen- see above in Anticoagulation Summary.  2. Follow up in 4 weeks.  3. Patient declines warfarin refills.  4. Verbal and written information provided. Patient expresses understanding and has no further questions at this time.    Sue Woodruff

## 2025-03-05 ENCOUNTER — TELEPHONE (OUTPATIENT)
Dept: PHARMACY | Facility: HOSPITAL | Age: 65
End: 2025-03-05
Payer: COMMERCIAL

## 2025-03-05 NOTE — TELEPHONE ENCOUNTER
----- Message from Garcia ARRIETA sent at 3/5/2025 10:06 AM EST -----  Regarding: Having surgery  Patient states he has a oral surgery coming up on march 11th and has to go on Lovenox 4 days prior  wants to come in for directions [phone]        Scheduled appt for 3/6

## 2025-03-06 ENCOUNTER — ANTICOAGULATION VISIT (OUTPATIENT)
Dept: PHARMACY | Facility: HOSPITAL | Age: 65
End: 2025-03-06
Payer: COMMERCIAL

## 2025-03-06 DIAGNOSIS — Z95.4 HISTORY OF AORTIC VALVE REPLACEMENT WITH METALLIC VALVE: Primary | ICD-10-CM

## 2025-03-06 LAB
INR PPP: 2.8 (ref 0.91–1.09)
PROTHROMBIN TIME: 33.7 SECONDS (ref 10–13.8)

## 2025-03-06 PROCEDURE — G0463 HOSPITAL OUTPT CLINIC VISIT: HCPCS

## 2025-03-06 PROCEDURE — 85610 PROTHROMBIN TIME: CPT

## 2025-03-06 RX ORDER — ENOXAPARIN SODIUM 100 MG/ML
80 INJECTION SUBCUTANEOUS EVERY 12 HOURS SCHEDULED
Qty: 6.4 ML | Refills: 1 | Status: SHIPPED | OUTPATIENT
Start: 2025-03-06

## 2025-03-06 NOTE — PROGRESS NOTES
Anticoagulation Clinic Progress Note    Anticoagulation Summary  As of 3/6/2025      INR goal:  2.5-3.5   TTR:  42.0% (6.5 y)   INR used for dosin.8 (3/6/2025)   Warfarin maintenance plan:  7.5 mg every Mon, Wed, Fri; 3.75 mg all other days   Weekly warfarin total:  37.5 mg   Plan last modified:  Gael Turner, PharmD (2024)   Next INR check:  3/14/2025   Priority:  Maintenance   Target end date:  Indefinite    Indications    History of aortic valve replacement with metallic valve [Z95.4]                 Anticoagulation Episode Summary       INR check location:  --    Preferred lab:  --    Send INR reminders to:   [facility]    Comments:  --          Anticoagulation Care Providers       Provider Role Specialty Phone number    Kitty Lagunas MD Referring Cardiology [phone]            Clinic Interview:  Patient Findings     Positives:  Upcoming dental procedure    Negatives:  Signs/symptoms of thrombosis, Signs/symptoms of bleeding,   Laboratory test error suspected, Change in health, Change in alcohol use,   Change in activity, Upcoming invasive procedure, Emergency department   visit, Missed doses, Extra doses, Change in medications, Change in   diet/appetite, Hospital admission, Bruising, Other complaints    Comments:  Dental extractions (3-4 teeth) scheduled or 3/11/25.   Cardiology cleared him to hold warfarin 3-4 days prior to procedure   alongside enoxaparin bridge (clearance letter 25).       Clinical Outcomes     Negatives:  Major bleeding event, Thromboembolic event,   Anticoagulation-related hospital admission, Anticoagulation-related ED   visit, Anticoagulation-related fatality    Comments:  Dental extractions (3-4 teeth) scheduled or 3/11/25.   Cardiology cleared him to hold warfarin 3-4 days prior to procedure   alongside enoxaparin bridge (clearance letter 25).         INR History:      2024     8:15 AM 10/22/2024     8:30 AM 2024     8:45 AM  1/16/2025     8:30 AM 1/30/2025     8:00 AM 2/20/2025     8:30 AM 3/6/2025    10:15 AM   Anticoagulation Monitoring   INR 2.3 3.7 4.1 1.8 3.4 2.7 2.8   INR Date 9/26/2024 10/22/2024 12/5/2024 1/16/2025 1/30/2025 2/20/2025 3/6/2025   INR Goal 2.5-3.5 2.5-3.5 2.5-3.5 2.5-3.5 2.5-3.5 2.5-3.5 2.5-3.5   Trend Same Same Down Same Same Same Same   Last Week Total 33.75 mg 41.25 mg 41.25 mg 37.5 mg 37.5 mg 37.5 mg 37.5 mg   Next Week Total 45 mg 41.25 mg 37.5 mg 41.25 mg 37.5 mg 37.5 mg 26.25 mg   Sun 3.75 mg 3.75 mg 3.75 mg 3.75 mg 3.75 mg 3.75 mg Hold (3/9)   Mon 7.5 mg 7.5 mg 7.5 mg 7.5 mg 7.5 mg 7.5 mg Hold (3/10)   Tue 3.75 mg 3.75 mg 3.75 mg 3.75 mg 3.75 mg 3.75 mg 7.5 mg (3/11)   Wed 7.5 mg 7.5 mg 7.5 mg 7.5 mg 7.5 mg 7.5 mg 7.5 mg   Thu 7.5 mg (9/26); Otherwise 3.75 mg 3.75 mg 3.75 mg 7.5 mg (1/16); Otherwise 3.75 mg 3.75 mg 3.75 mg 7.5 mg (3/13); Otherwise 3.75 mg   Fri 7.5 mg 7.5 mg 7.5 mg 7.5 mg 7.5 mg 7.5 mg 7.5 mg   Sat 7.5 mg 7.5 mg 3.75 mg 3.75 mg 3.75 mg 3.75 mg Hold (3/8)       Plan:  1. INR is Therapeutic today- see above in Anticoagulation Summary.  Will instruct Stew Cabral to Change their warfarin regimen as directed surrounding upcoming procedure - see above in Anticoagulation Summary. Administer enoxaparin 80 mg every 12 hours as directed surrounding upcoming procedure. Reviewed and provided handouts on enoxaparin administration and a bridging calendar.     Perioperative Anticoag Instructions:  3/08/25   HOLD warfarin  3/09/25   HOLD warfarin; enoxaparin 80 mg every 12 hours beginning in PM  3/10/25   HOLD warfarin; enoxaparin 80 mg in AM only (at least 24 hours prior to procedure)  3/11/25   PROCEDURE; warfarin 7.5 mg (if cleared by surgeon); HOLD enoxaparin  3/12/25   warfarin 7.5 mg; enoxaparin 80 mg every 12 hours  3/13/25   warfarin 7.5 mg; enoxaparin 80 mg every 12 hours  3/14/25   enoxaparin 80 mg in AM; INR CHECK to determine further plan    2. Follow up 3/14/25.   3. Patient declines  warfarin refills. Enoxaparin prescription sent to [facility] per patient request.   4. Verbal and written information provided. Patient expresses understanding and has no further questions at this time.    Gael Turner, PharmD

## 2025-03-10 ENCOUNTER — ANTICOAGULATION VISIT (OUTPATIENT)
Dept: PHARMACY | Facility: HOSPITAL | Age: 65
End: 2025-03-10
Payer: COMMERCIAL

## 2025-03-10 DIAGNOSIS — Z95.4 HISTORY OF AORTIC VALVE REPLACEMENT WITH METALLIC VALVE: Primary | ICD-10-CM

## 2025-03-10 LAB
INR PPP: 1.2 (ref 0.91–1.09)
INR PPP: 1.2 (ref 0.91–1.09)
PROTHROMBIN TIME: 14.4 SECONDS (ref 10–13.8)
PROTHROMBIN TIME: 14.9 SECONDS (ref 10–13.8)

## 2025-03-10 PROCEDURE — 85610 PROTHROMBIN TIME: CPT

## 2025-03-10 PROCEDURE — G0463 HOSPITAL OUTPT CLINIC VISIT: HCPCS

## 2025-03-10 NOTE — PROGRESS NOTES
Anticoagulation Clinic Progress Note    Anticoagulation Summary  As of 3/10/2025      INR goal:  2.5-3.5   TTR:  42.0% (6.5 y)   INR used for dosin.2 (3/10/2025)   Warfarin maintenance plan:  7.5 mg every Mon, Wed, Fri; 3.75 mg all other days   Weekly warfarin total:  37.5 mg   Plan last modified:  Gael Turner, PharmD (2024)   Next INR check:  3/14/2025   Priority:  Maintenance   Target end date:  Indefinite    Indications    History of aortic valve replacement with metallic valve [Z95.4]                 Anticoagulation Episode Summary       INR check location:  --    Preferred lab:  --    Send INR reminders to:   [facility]    Comments:  --          Anticoagulation Care Providers       Provider Role Specialty Phone number    Kitty Lagunas MD Referring Cardiology [phone]            Clinic Interview:  Patient Findings     Positives:  Upcoming dental procedure, Other complaints    Negatives:  Signs/symptoms of thrombosis, Signs/symptoms of bleeding,   Laboratory test error suspected, Change in health, Change in alcohol use,   Change in activity, Upcoming invasive procedure, Emergency department   visit, Missed doses, Extra doses, Change in medications, Change in   diet/appetite, Hospital admission, Bruising    Comments:  Dental extractions (3-4 teeth) scheduled or 3/11/25.   Cardiology cleared him to hold warfarin 3-4 days prior to procedure   alongside enoxaparin bridge (clearance letter 25). I had originally   directed him to only hold 3 days prior to the procedure. However, the oral   surgeon called him 3/7/25 and instructed him to hold that day (4 days   prior to procedure). He began enoxaparin 80 mg every 12 hours yesterday   evening as previously directed.       Clinical Outcomes     Negatives:  Major bleeding event, Thromboembolic event,   Anticoagulation-related hospital admission, Anticoagulation-related ED   visit, Anticoagulation-related fatality    Comments:   Dental extractions (3-4 teeth) scheduled or 3/11/25.   Cardiology cleared him to hold warfarin 3-4 days prior to procedure   alongside enoxaparin bridge (clearance letter 2/17/25). I had originally   directed him to only hold 3 days prior to the procedure. However, the oral   surgeon called him 3/7/25 and instructed him to hold that day (4 days   prior to procedure). He began enoxaparin 80 mg every 12 hours yesterday   evening as previously directed.         INR History:      10/22/2024     8:30 AM 12/5/2024     8:45 AM 1/16/2025     8:30 AM 1/30/2025     8:00 AM 2/20/2025     8:30 AM 3/6/2025    10:15 AM 3/10/2025    11:15 AM   Anticoagulation Monitoring   INR 3.7 4.1 1.8 3.4 2.7 2.8 1.2   INR Date 10/22/2024 12/5/2024 1/16/2025 1/30/2025 2/20/2025 3/6/2025 3/10/2025   INR Goal 2.5-3.5 2.5-3.5 2.5-3.5 2.5-3.5 2.5-3.5 2.5-3.5 2.5-3.5   Trend Same Down Same Same Same Same Same   Last Week Total 41.25 mg 41.25 mg 37.5 mg 37.5 mg 37.5 mg 37.5 mg 22.5 mg   Next Week Total 41.25 mg 37.5 mg 41.25 mg 37.5 mg 37.5 mg 26.25 mg 37.5 mg   Sun 3.75 mg 3.75 mg 3.75 mg 3.75 mg 3.75 mg Hold (3/9) -   Mon 7.5 mg 7.5 mg 7.5 mg 7.5 mg 7.5 mg Hold (3/10) Hold (3/10)   Tue 3.75 mg 3.75 mg 3.75 mg 3.75 mg 3.75 mg 7.5 mg (3/11) 7.5 mg (3/11)   Wed 7.5 mg 7.5 mg 7.5 mg 7.5 mg 7.5 mg 7.5 mg 7.5 mg   Thu 3.75 mg 3.75 mg 7.5 mg (1/16); Otherwise 3.75 mg 3.75 mg 3.75 mg 7.5 mg (3/13); Otherwise 3.75 mg 7.5 mg (3/13)   Fri 7.5 mg 7.5 mg 7.5 mg 7.5 mg 7.5 mg 7.5 mg -   Sat 7.5 mg 3.75 mg 3.75 mg 3.75 mg 3.75 mg Hold (3/8) -       Plan:  1. INR is Subtherapeutic today- see above in Anticoagulation Summary.  Will instruct Stew Cabral to Change their warfarin regimen as directed surrounding upcoming procedure - see above in Anticoagulation Summary. Administer enoxaparin 80 mg every 12 hours as directed surrounding upcoming procedure. Previously reviewed and provided handouts on enoxaparin administration and a bridging calendar.       Perioperative Anticoag Instructions:  3/10/25   HOLD warfarin; enoxaparin 80 mg in AM only (at least 24 hours prior to procedure)  3/11/25   PROCEDURE; warfarin 7.5 mg (if cleared by surgeon); HOLD enoxaparin  3/12/25   warfarin 7.5 mg; enoxaparin 80 mg every 12 hours  3/13/25   warfarin 7.5 mg; enoxaparin 80 mg every 12 hours  3/14/25   enoxaparin 80 mg in AM; INR CHECK to determine further plan     2. Follow up 3/14/25.   3. Patient declines warfarin or enoxaparin refills.  4. Verbal and written information provided. Patient expresses understanding and has no further questions at this time.    Gael Turner, PharmD

## 2025-03-12 DIAGNOSIS — E78.2 MIXED HYPERLIPIDEMIA: Primary | ICD-10-CM

## 2025-03-12 DIAGNOSIS — R73.03 PREDIABETES: ICD-10-CM

## 2025-03-12 DIAGNOSIS — Z79.01 WARFARIN ANTICOAGULATION: ICD-10-CM

## 2025-03-12 DIAGNOSIS — Z13.9 DUE FOR SCREENING: ICD-10-CM

## 2025-03-13 RX ORDER — METFORMIN HYDROCHLORIDE 500 MG/1
1000 TABLET, EXTENDED RELEASE ORAL
Qty: 180 TABLET | Refills: 0 | Status: SHIPPED | OUTPATIENT
Start: 2025-03-13

## 2025-03-14 ENCOUNTER — ANTICOAGULATION VISIT (OUTPATIENT)
Dept: PHARMACY | Facility: HOSPITAL | Age: 65
End: 2025-03-14
Payer: COMMERCIAL

## 2025-03-14 DIAGNOSIS — Z95.4 HISTORY OF AORTIC VALVE REPLACEMENT WITH METALLIC VALVE: Primary | ICD-10-CM

## 2025-03-14 LAB
INR PPP: 2 (ref 0.91–1.09)
PROTHROMBIN TIME: 23.6 SECONDS (ref 10–13.8)

## 2025-03-14 PROCEDURE — G0463 HOSPITAL OUTPT CLINIC VISIT: HCPCS

## 2025-03-14 PROCEDURE — 85610 PROTHROMBIN TIME: CPT

## 2025-03-14 NOTE — PROGRESS NOTES
Anticoagulation Clinic Progress Note    Anticoagulation Summary  As of 3/14/2025      INR goal:  2.5-3.5   TTR:  41.9% (6.5 y)   INR used for dosin.0 (3/14/2025)   Warfarin maintenance plan:  7.5 mg every Mon, Wed, Fri; 3.75 mg all other days   Weekly warfarin total:  37.5 mg   No change documented:  Gael Turner, You   Plan last modified:  Gael Turner PharmD (2024)   Next INR check:  3/21/2025   Priority:  Maintenance   Target end date:  Indefinite    Indications    History of aortic valve replacement with metallic valve [Z95.4]                 Anticoagulation Episode Summary       INR check location:  --    Preferred lab:  --    Send INR reminders to:  [facility]    Comments:  --          Anticoagulation Care Providers       Provider Role Specialty Phone number    Kitty Lagunas MD Referring Cardiology [phone]            Clinic Interview:  Patient Findings     Positives:  Change in medications, Change in diet/appetite    Negatives:  Signs/symptoms of thrombosis, Signs/symptoms of bleeding,   Laboratory test error suspected, Change in health, Change in alcohol use,   Change in activity, Upcoming invasive procedure, Emergency department   visit, Upcoming dental procedure, Missed doses, Extra doses, Hospital   admission, Bruising, Other complaints    Comments:  S/p dental extractions 3/11/25. Has followed perioperative   instructions as previously documented. Reports he has been limited to soft   foods. He has been taking ibuprofen temporarily for pain relief.       Clinical Outcomes     Negatives:  Major bleeding event, Thromboembolic event,   Anticoagulation-related hospital admission, Anticoagulation-related ED   visit, Anticoagulation-related fatality    Comments:  S/p dental extractions 3/11/25. Has followed perioperative   instructions as previously documented. Reports he has been limited to soft   foods. He has been taking ibuprofen temporarily for pain relief.          INR History:      12/5/2024     8:45 AM 1/16/2025     8:30 AM 1/30/2025     8:00 AM 2/20/2025     8:30 AM 3/6/2025    10:15 AM 3/10/2025    11:15 AM 3/14/2025     8:30 AM   Anticoagulation Monitoring   INR 4.1 1.8 3.4 2.7 2.8 1.2 2.0   INR Date 12/5/2024 1/16/2025 1/30/2025 2/20/2025 3/6/2025 3/10/2025 3/14/2025   INR Goal 2.5-3.5 2.5-3.5 2.5-3.5 2.5-3.5 2.5-3.5 2.5-3.5 2.5-3.5   Trend Down Same Same Same Same Same Same   Last Week Total 41.25 mg 37.5 mg 37.5 mg 37.5 mg 37.5 mg 22.5 mg 22.5 mg   Next Week Total 37.5 mg 41.25 mg 37.5 mg 37.5 mg 26.25 mg 37.5 mg 37.5 mg   Sun 3.75 mg 3.75 mg 3.75 mg 3.75 mg Hold (3/9) - 3.75 mg   Mon 7.5 mg 7.5 mg 7.5 mg 7.5 mg Hold (3/10) Hold (3/10) 7.5 mg   Tue 3.75 mg 3.75 mg 3.75 mg 3.75 mg 7.5 mg (3/11) 7.5 mg (3/11) 3.75 mg   Wed 7.5 mg 7.5 mg 7.5 mg 7.5 mg 7.5 mg 7.5 mg 7.5 mg   Thu 3.75 mg 7.5 mg (1/16); Otherwise 3.75 mg 3.75 mg 3.75 mg 7.5 mg (3/13); Otherwise 3.75 mg 7.5 mg (3/13) 3.75 mg   Fri 7.5 mg 7.5 mg 7.5 mg 7.5 mg 7.5 mg - 7.5 mg   Sat 3.75 mg 3.75 mg 3.75 mg 3.75 mg Hold (3/8) - 3.75 mg       Plan:  1. INR is Subtherapeutic today- see above in Anticoagulation Summary.  Will instruct Stew Cabral to return to his warfarin regimen of 7.5 mg MWF, 3.75 mg all other days - see above in Anticoagulation Summary. Continue enoxaparin 80 mg every 12 hours through this morning (final syringe of his supply), then discontinue.   2. Follow up in 1 week  3. Patient declines warfarin refills.  4. Verbal and written information provided. Patient expresses understanding and has no further questions at this time.    Gael Turner, PharmD

## 2025-03-21 ENCOUNTER — ANTICOAGULATION VISIT (OUTPATIENT)
Dept: PHARMACY | Facility: HOSPITAL | Age: 65
End: 2025-03-21
Payer: COMMERCIAL

## 2025-03-21 DIAGNOSIS — Z95.4 HISTORY OF AORTIC VALVE REPLACEMENT WITH METALLIC VALVE: Primary | ICD-10-CM

## 2025-03-21 LAB
INR PPP: 2.5 (ref 0.91–1.09)
PROTHROMBIN TIME: 30.2 SECONDS (ref 10–13.8)

## 2025-03-21 PROCEDURE — G0463 HOSPITAL OUTPT CLINIC VISIT: HCPCS

## 2025-03-21 PROCEDURE — 85610 PROTHROMBIN TIME: CPT

## 2025-03-21 NOTE — PROGRESS NOTES
I have supervised and reviewed the notes, assessments, and/or procedures performed. I personally performed the assessment and implemented the plan. I concur with the documentation of this patient encounter.    Merlyn Hassan, [facility]

## 2025-03-21 NOTE — PROGRESS NOTES
Anticoagulation Clinic Progress Note    Anticoagulation Summary  As of 3/21/2025      INR goal:  2.5-3.5   TTR:  41.8% (6.5 y)   INR used for dosin.5 (3/21/2025)   Warfarin maintenance plan:  7.5 mg every Mon, Wed, Fri; 3.75 mg all other days   Weekly warfarin total:  37.5 mg   No change documented:  Sue Woodruff   Plan last modified:  Gael Turner, PharmD (2024)   Next INR check:  2025   Priority:  Maintenance   Target end date:  Indefinite    Indications    History of aortic valve replacement with metallic valve [Z95.4]                 Anticoagulation Episode Summary       INR check location:  --    Preferred lab:  --    Send INR reminders to:  [facility]    Comments:  --          Anticoagulation Care Providers       Provider Role Specialty Phone number    Kitty Lagunas MD Referring Cardiology [phone]            Clinic Interview:  Patient Findings     Negatives:  Signs/symptoms of thrombosis, Signs/symptoms of bleeding,   Laboratory test error suspected, Change in health, Change in alcohol use,   Change in activity, Upcoming invasive procedure, Emergency department   visit, Upcoming dental procedure, Missed doses, Extra doses, Change in   medications, Change in diet/appetite, Hospital admission, Bruising, Other   complaints      Clinical Outcomes     Negatives:  Major bleeding event, Thromboembolic event,   Anticoagulation-related hospital admission, Anticoagulation-related ED   visit, Anticoagulation-related fatality        INR History:      2025     8:30 AM 2025     8:00 AM 2025     8:30 AM 3/6/2025    10:15 AM 3/10/2025    11:15 AM 3/14/2025     8:30 AM 3/21/2025     8:15 AM   Anticoagulation Monitoring   INR 1.8 3.4 2.7 2.8 1.2 2.0 2.5   INR Date 2025 2025 2025 3/6/2025 3/10/2025 3/14/2025 3/21/2025   INR Goal 2.5-3.5 2.5-3.5 2.5-3.5 2.5-3.5 2.5-3.5 2.5-3.5 2.5-3.5   Trend Same Same Same Same Same Same Same   Last Week Total  37.5 mg 37.5 mg 37.5 mg 37.5 mg 22.5 mg 22.5 mg 37.5 mg   Next Week Total 41.25 mg 37.5 mg 37.5 mg 26.25 mg 37.5 mg 37.5 mg 37.5 mg   Sun 3.75 mg 3.75 mg 3.75 mg Hold (3/9) - 3.75 mg 3.75 mg   Mon 7.5 mg 7.5 mg 7.5 mg Hold (3/10) Hold (3/10) 7.5 mg 7.5 mg   Tue 3.75 mg 3.75 mg 3.75 mg 7.5 mg (3/11) 7.5 mg (3/11) 3.75 mg 3.75 mg   Wed 7.5 mg 7.5 mg 7.5 mg 7.5 mg 7.5 mg 7.5 mg 7.5 mg   Thu 7.5 mg (1/16); Otherwise 3.75 mg 3.75 mg 3.75 mg 7.5 mg (3/13); Otherwise 3.75 mg 7.5 mg (3/13) 3.75 mg 3.75 mg   Fri 7.5 mg 7.5 mg 7.5 mg 7.5 mg - 7.5 mg 7.5 mg   Sat 3.75 mg 3.75 mg 3.75 mg Hold (3/8) - 3.75 mg 3.75 mg       Plan:  1. INR is therapeutic today- see above in Anticoagulation Summary.   Will instruct Stew Cabral to continue their warfarin regimen- see above in Anticoagulation Summary.  2. Follow up in 4 weeks.  3. Patient declines warfarin refills.  4. Verbal and written information provided. Patient expresses understanding and has no further questions at this time.    Sue Woodruff

## 2025-05-19 RX ORDER — WARFARIN SODIUM 7.5 MG/1
TABLET ORAL
Qty: 75 TABLET | Refills: 1 | Status: SHIPPED | OUTPATIENT
Start: 2025-05-19

## 2025-05-20 ENCOUNTER — ANTICOAGULATION VISIT (OUTPATIENT)
Dept: PHARMACY | Facility: HOSPITAL | Age: 65
End: 2025-05-20
Payer: COMMERCIAL

## 2025-05-20 DIAGNOSIS — Z95.4 HISTORY OF AORTIC VALVE REPLACEMENT WITH METALLIC VALVE: Primary | ICD-10-CM

## 2025-05-20 LAB
INR PPP: 3.5 (ref 0.91–1.09)
PROTHROMBIN TIME: 41.8 SECONDS (ref 10–13.8)

## 2025-05-20 PROCEDURE — G0463 HOSPITAL OUTPT CLINIC VISIT: HCPCS

## 2025-05-20 PROCEDURE — 85610 PROTHROMBIN TIME: CPT

## 2025-05-20 NOTE — PROGRESS NOTES
Anticoagulation Clinic Progress Note    Anticoagulation Summary  As of 5/20/2025      INR goal:  2.5-3.5   TTR:  43.2% (6.7 y)   INR used for dosing:  3.5 (5/20/2025)   Warfarin maintenance plan:  7.5 mg every Mon, Wed, Fri; 3.75 mg all other days   Weekly warfarin total:  37.5 mg   No change documented:  Garcia Pendleton, Pharmacy Technician   Plan last modified:  Gael Turner, PharmD (12/5/2024)   Next INR check:  6/17/2025   Priority:  Maintenance   Target end date:  Indefinite    Indications    History of aortic valve replacement with metallic valve [Z95.4]                 Anticoagulation Episode Summary       INR check location:  --    Preferred lab:  --    Send INR reminders to:   [facility]    Comments:  --          Anticoagulation Care Providers       Provider Role Specialty Phone number    Kitty Lagunas MD Referring Cardiology [phone]            Clinic Interview:  Patient Findings     Negatives:  Signs/symptoms of thrombosis, Signs/symptoms of bleeding,   Laboratory test error suspected, Change in health, Change in alcohol use,   Change in activity, Upcoming invasive procedure, Emergency department   visit, Upcoming dental procedure, Missed doses, Extra doses, Change in   medications, Change in diet/appetite, Hospital admission, Bruising, Other   complaints      Clinical Outcomes     Negatives:  Major bleeding event, Thromboembolic event,   Anticoagulation-related hospital admission, Anticoagulation-related ED   visit, Anticoagulation-related fatality        INR History:      1/30/2025     8:00 AM 2/20/2025     8:30 AM 3/6/2025    10:15 AM 3/10/2025    11:15 AM 3/14/2025     8:30 AM 3/21/2025     8:15 AM 5/20/2025     8:15 AM   Anticoagulation Monitoring   INR 3.4 2.7 2.8 1.2 2.0 2.5 3.5   INR Date 1/30/2025 2/20/2025 3/6/2025 3/10/2025 3/14/2025 3/21/2025 5/20/2025   INR Goal 2.5-3.5 2.5-3.5 2.5-3.5 2.5-3.5 2.5-3.5 2.5-3.5 2.5-3.5   Trend Same Same Same Same Same Same Same   Last  Week Total 37.5 mg 37.5 mg 37.5 mg 22.5 mg 22.5 mg 37.5 mg 37.5 mg   Next Week Total 37.5 mg 37.5 mg 26.25 mg 37.5 mg 37.5 mg 37.5 mg 37.5 mg   Sun 3.75 mg 3.75 mg Hold (3/9) - 3.75 mg 3.75 mg 3.75 mg   Mon 7.5 mg 7.5 mg Hold (3/10) Hold (3/10) 7.5 mg 7.5 mg 7.5 mg   Tue 3.75 mg 3.75 mg 7.5 mg (3/11) 7.5 mg (3/11) 3.75 mg 3.75 mg 3.75 mg   Wed 7.5 mg 7.5 mg 7.5 mg 7.5 mg 7.5 mg 7.5 mg 7.5 mg   Thu 3.75 mg 3.75 mg 7.5 mg (3/13); Otherwise 3.75 mg 7.5 mg (3/13) 3.75 mg 3.75 mg 3.75 mg   Fri 7.5 mg 7.5 mg 7.5 mg - 7.5 mg 7.5 mg 7.5 mg   Sat 3.75 mg 3.75 mg Hold (3/8) - 3.75 mg 3.75 mg 3.75 mg       Plan:  1. INR is therapeutic today- see above in Anticoagulation Summary.   Will instruct Stew Cabral to continue their warfarin regimen- see above in Anticoagulation Summary.  2. Follow up in 4 weeks.  3. Patient declines warfarin refills.  4. Verbal and written information provided. Patient expresses understanding and has no further questions at this time.    Garcia Pendleton, Pharmacy Technician

## 2025-06-03 ENCOUNTER — OFFICE VISIT (OUTPATIENT)
Age: 65
End: 2025-06-03
Payer: COMMERCIAL

## 2025-06-03 VITALS
WEIGHT: 170 LBS | BODY MASS INDEX: 25.18 KG/M2 | HEART RATE: 73 BPM | HEIGHT: 69 IN | DIASTOLIC BLOOD PRESSURE: 76 MMHG | SYSTOLIC BLOOD PRESSURE: 128 MMHG

## 2025-06-03 DIAGNOSIS — Z79.01 WARFARIN ANTICOAGULATION: ICD-10-CM

## 2025-06-03 DIAGNOSIS — Z95.4 HISTORY OF AORTIC VALVE REPLACEMENT WITH METALLIC VALVE: ICD-10-CM

## 2025-06-03 DIAGNOSIS — E78.2 MIXED HYPERLIPIDEMIA: Primary | ICD-10-CM

## 2025-06-03 DIAGNOSIS — I77.810 ASCENDING AORTA DILATATION: ICD-10-CM

## 2025-06-03 NOTE — PROGRESS NOTES
Subjective:     Encounter Date:06/03/2025      Patient ID: Stew Cabral is a 64 y.o. male.    Chief Complaint:follow up [facility] aortic valve  History of Present Illness  This is a 63 y/o man who follows with Dr. Lagunas and is new to me today. He has a pmhx of bicuspid aortic valve with critical aortic stenosis s/p mechanical aortic valve replacement with a 25 mm ATS valve in 2009, ascending aorta dilatation and hyperlipidemia.    He is here today for a follow up visit. He has been doing well with no complaints of chest pain, shortness of breath, palpitations, dizziness or syncope. No swelling in his legs, orthopnea or PND. His INRs have been pretty stable and he is on monthly checks now. He denies any hematuria or melena. His blood pressure has been well controlled.    Prior history:  He was previously followed by Dr. Arenas for his aortic stenosis.  From the time of his aortic valve surgery he was noted to have a nonischemic cardiomyopathy with an EF of 30%.  Repeat echocardiogram postsurgery showed normalization of his EF.    Dr. Lagunas began following him in 2013 when he presented to establish care.  A repeat echo at that time continue to show normal LV function and a normal functioning aortic valve.    In 2022 he had a repeat echocardiogram that showed left ventricular systolic function wall motion with an EF of 52%, mild concentric left ventricular hypertrophy, grade 1 diastolic dysfunction, normal function of the mechanical aortic valve, normal right ventricular systolic pressure and mild dilatation of the ascending aorta measuring about 4 cm.     He was lost to follow-up until May 2024 which was the last time he is seeing Dr. Lagunas.  Visit he reported an episode where he was talking with somebody at work while holding his reading glasses in his right hand.  He had a sudden onset of contraction of the right hand with the inability to speak.  This lasted a couple of seconds and resolved without any recurrence.   Dr. Lagunas reviewed his INR's and it did not appear that he had had any subtherapeutic levels.  Repeat echocardiogram was performed which was essentially unchanged.  It was recommended that he monitor for recurrent episodes.    I have reviewed and updated as appropriate allergies, current medications, past family history, past medical history, past surgical history and problem list.    Review of Systems   Constitutional: Negative for fever, malaise/fatigue, weight gain and weight loss.   HENT:  Negative for congestion, hoarse voice and sore throat.    Eyes:  Negative for blurred vision and double vision.   Cardiovascular:  Negative for chest pain, dyspnea on exertion, leg swelling, orthopnea, palpitations and syncope.   Respiratory:  Negative for cough, shortness of breath and wheezing.    Gastrointestinal:  Negative for abdominal pain, hematemesis, hematochezia and melena.   Genitourinary:  Negative for dysuria and hematuria.   Neurological:  Negative for dizziness, headaches, light-headedness and numbness.   Psychiatric/Behavioral:  Negative for depression. The patient is not nervous/anxious.          Current Outpatient Medications:     lisdexamfetamine (VYVANSE) 30 MG capsule, Take 1 capsule by mouth Every Morning, Disp: , Rfl:     metFORMIN ER (GLUCOPHAGE-XR) 500 MG 24 hr tablet, TAKE 2 TABLETS BY MOUTH DAILY WITH BREAKFAST, Disp: 180 tablet, Rfl: 0    rosuvastatin (Crestor) 20 MG tablet, Take 1 tablet by mouth Every Night. Indications: High Amount of Fats in the Blood, High Amount of Triglycerides in the Blood, Disp: 90 tablet, Rfl: 3    warfarin (COUMADIN) 7.5 MG tablet, Take one tablet by mouth on mon, wed, fri and take one-half of a tablet by mouth all other days or as directed, Disp: 75 tablet, Rfl: 1    amoxicillin (AMOXIL) 500 MG capsule, Take 4 capsules by mouth See Admin Instructions. 4 capsules 1 hour prior to procedure (Patient not taking: Reported on 6/3/2025), Disp: 12 capsule, Rfl: 0    Past Medical  History:   Diagnosis Date    ADD (attention deficit disorder)     Aortic valvar stenosis     Bicuspid aortic valve     CHF (congestive heart failure)     Colon polyps     FOLLOWED BY DR. 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 BRAYDON MOFFETT AT [facility]    CORONARY ANGIOPLASTY Left     AV stenosis    CORONARY ARTERY BYPASS GRAFT      US GUIDED LYMPH NODE BIOPSY  2024       Family History   Problem Relation Age of Onset    Hypertension Father     Cancer Maternal Grandfather     Lung cancer Paternal Grandmother         smoker    Malig Hyperthermia Neg Hx        Social History     Tobacco Use    Smoking status: Former     Current packs/day: 0.00     Average packs/day: 1 pack/day for 30.8 years (30.8 ttl pk-yrs)     Types: Cigarettes     Start date: 1993     Quit date: 2023     Years since quittin.5     Passive exposure: Past    Smokeless tobacco: Never    Tobacco comments:     Caffiene daily   Vaping Use    Vaping status: Never Used   Substance Use Topics    Alcohol use: Yes     Alcohol/week: 0.0 - 1.0 standard drinks of alcohol     Comment: Rarely    Drug use: Yes     Types: Marijuana     Comment: CBD gummy occasionally         ECG 12 Lead    Date/Time: 6/3/2025 12:56 PM  Performed by: Montse Pratt APRN    Authorized by: Montse Pratt APRN  Comparison: compared with previous ECG from 2024  Similar to previous ECG  Rhythm: sinus rhythm             Objective:  "    Visit Vitals  /76 (BP Location: Right arm, Patient Position: Sitting, Cuff Size: Adult)   Pulse 73   Ht 175.3 cm (69\")   Wt 77.1 kg (170 lb)   BMI 25.10 kg/m²             Physical Exam  Constitutional:       Appearance: Normal appearance. He is normal weight.   HENT:      Head: Normocephalic.   Neck:      Vascular: No carotid bruit.   Cardiovascular:      Rate and Rhythm: Normal rate and regular rhythm.      Chest Wall: PMI is not displaced.      Pulses: Normal pulses.           Radial pulses are 2+ on the right side and 2+ on the left side.        Posterior tibial pulses are 2+ on the right side and 2+ on the left side.      Heart sounds: No murmur heard.     No friction rub. No gallop.      Comments: Crisp mechanical valve sounds  Pulmonary:      Effort: Pulmonary effort is normal.      Breath sounds: Normal breath sounds.   Abdominal:      General: Bowel sounds are normal. There is no distension.      Palpations: Abdomen is soft.   Musculoskeletal:      Right lower leg: No edema.      Left lower leg: No edema.   Skin:     General: Skin is warm and dry.      Capillary Refill: Capillary refill takes less than 2 seconds.   Neurological:      Mental Status: He is alert and oriented to person, place, and time.   Psychiatric:         Mood and Affect: Mood normal.         Behavior: Behavior normal.         Thought Content: Thought content normal.          Lab Review:   Lipid Panel          3/18/2025    08:51   Lipid Panel   Total Cholesterol 149    Triglycerides 157    HDL Cholesterol 41    VLDL Cholesterol 27    LDL Cholesterol  81          Cardiac Procedures:       Assessment:         Diagnoses and all orders for this visit:    1. Mixed hyperlipidemia (Primary)    2. Warfarin anticoagulation    3. History of aortic valve replacement with metallic valve    4. Ascending aorta dilatation            Plan:       Ascending aortic dilatation: 4 cm on echo in 2024 which was unchanged from echo in 2022. Will continue " with optimal BP control and routine surveillanec.  Mechanical aortic valve: on warfarin. INR managed by anticoagulation clinic. Normal sounds on exam.  HLD: on statin therapy. Managed by PCP    Thank you for allowing me to participate in this patient's care. Please call with any questions or concerns. Mr. Cabral will follow up with Dr Lagunas in 1 year.          Your medication list            Accurate as of Karen 3, 2025  9:33 AM. If you have any questions, ask your nurse or doctor.                CONTINUE taking these medications        Instructions Last Dose Given Next Dose Due   amoxicillin 500 MG capsule  Commonly known as: AMOXIL      Take 4 capsules by mouth See Admin Instructions. 4 capsules 1 hour prior to procedure       lisdexamfetamine 30 MG capsule  Commonly known as: VYVANSE      Take 1 capsule by mouth Every Morning       metFORMIN  MG 24 hr tablet  Commonly known as: GLUCOPHAGE-XR      TAKE 2 TABLETS BY MOUTH DAILY WITH BREAKFAST       rosuvastatin 20 MG tablet  Commonly known as: Crestor      Take 1 tablet by mouth Every Night. Indications: High Amount of Fats in the Blood, High Amount of Triglycerides in the Blood       warfarin 7.5 MG tablet  Commonly known as: COUMADIN      Take one tablet by mouth on mon, wed, fri and take one-half of a tablet by mouth all other days or as directed                  EUFEMIA Rowland  06/03/25  9:33 AM EDT

## 2025-06-10 DIAGNOSIS — Z12.5 SCREENING FOR MALIGNANT NEOPLASM OF PROSTATE: Primary | ICD-10-CM

## 2025-06-10 DIAGNOSIS — E11.65 TYPE 2 DIABETES MELLITUS WITH HYPERGLYCEMIA, WITHOUT LONG-TERM CURRENT USE OF INSULIN: ICD-10-CM

## 2025-06-10 DIAGNOSIS — E78.2 MIXED HYPERLIPIDEMIA: ICD-10-CM

## 2025-06-10 RX ORDER — METFORMIN HYDROCHLORIDE 500 MG/1
1000 TABLET, EXTENDED RELEASE ORAL
Qty: 180 TABLET | Refills: 0 | Status: SHIPPED | OUTPATIENT
Start: 2025-06-10

## 2025-06-17 ENCOUNTER — ANTICOAGULATION VISIT (OUTPATIENT)
Dept: PHARMACY | Facility: HOSPITAL | Age: 65
End: 2025-06-17
Payer: COMMERCIAL

## 2025-06-17 DIAGNOSIS — Z95.4 HISTORY OF AORTIC VALVE REPLACEMENT WITH METALLIC VALVE: Primary | ICD-10-CM

## 2025-06-17 LAB
INR PPP: 1.7 (ref 0.91–1.09)
PROTHROMBIN TIME: 20.6 SECONDS (ref 10–13.8)

## 2025-06-17 PROCEDURE — 85610 PROTHROMBIN TIME: CPT

## 2025-06-17 PROCEDURE — G0463 HOSPITAL OUTPT CLINIC VISIT: HCPCS

## 2025-06-17 NOTE — PROGRESS NOTES
Anticoagulation Clinic Progress Note    Anticoagulation Summary  As of 2025      INR goal:  2.5-3.5   TTR:  43.4% (6.7 y)   INR used for dosin.7 (2025)   Warfarin maintenance plan:  7.5 mg every Mon, Wed, Fri; 3.75 mg all other days   Weekly warfarin total:  37.5 mg   Plan last modified:  Gael Turner, PharmD (2024)   Next INR check:  2025   Priority:  Maintenance   Target end date:  Indefinite    Indications    History of aortic valve replacement with metallic valve [Z95.4]                 Anticoagulation Episode Summary       INR check location:  --    Preferred lab:  --    Send INR reminders to:   MADALYN BILLS [facility]    Comments:  --          Anticoagulation Care Providers       Provider Role Specialty Phone number    Kitty Lagunas MD Referring Cardiology [phone]            Clinic Interview:  Patient Findings     Positives:  Change in diet/appetite    Negatives:  Signs/symptoms of thrombosis, Signs/symptoms of bleeding,   Laboratory test error suspected, Change in health, Change in alcohol use,   Change in activity, Upcoming invasive procedure, Emergency department   visit, Upcoming dental procedure, Missed doses, Extra doses, Change in   medications, Hospital admission, Bruising, Other complaints    Comments:  extra salads      Clinical Outcomes     Negatives:  Major bleeding event, Thromboembolic event,   Anticoagulation-related hospital admission, Anticoagulation-related ED   visit, Anticoagulation-related fatality    Comments:  extra salads        INR History:      2025     8:30 AM 3/6/2025    10:15 AM 3/10/2025    11:15 AM 3/14/2025     8:30 AM 3/21/2025     8:15 AM 2025     8:15 AM 2025     8:15 AM   Anticoagulation Monitoring   INR 2.7 2.8 1.2 2.0 2.5 3.5 1.7   INR Date 2025 3/6/2025 3/10/2025 3/14/2025 3/21/2025 2025 2025   INR Goal 2.5-3.5 2.5-3.5 2.5-3.5 2.5-3.5 2.5-3.5 2.5-3.5 2.5-3.5   Trend Same Same Same Same Same Same Same    Last Week Total 37.5 mg 37.5 mg 22.5 mg 22.5 mg 37.5 mg 37.5 mg 37.5 mg   Next Week Total 37.5 mg 26.25 mg 37.5 mg 37.5 mg 37.5 mg 37.5 mg 45 mg   Sun 3.75 mg Hold (3/9) - 3.75 mg 3.75 mg 3.75 mg 3.75 mg   Mon 7.5 mg Hold (3/10) Hold (3/10) 7.5 mg 7.5 mg 7.5 mg 7.5 mg   Tue 3.75 mg 7.5 mg (3/11) 7.5 mg (3/11) 3.75 mg 3.75 mg 3.75 mg 11.25 mg (6/17); Otherwise 3.75 mg   Wed 7.5 mg 7.5 mg 7.5 mg 7.5 mg 7.5 mg 7.5 mg 7.5 mg   Thu 3.75 mg 7.5 mg (3/13); Otherwise 3.75 mg 7.5 mg (3/13) 3.75 mg 3.75 mg 3.75 mg 3.75 mg   Fri 7.5 mg 7.5 mg - 7.5 mg 7.5 mg 7.5 mg 7.5 mg   Sat 3.75 mg Hold (3/8) - 3.75 mg 3.75 mg 3.75 mg 3.75 mg       Plan:  1. INR is Subtherapeutic today- see above in Anticoagulation Summary.  Will instruct Stew GABINO Cabral to Increase their warfarin regimen- see above in Anticoagulation Summary.      boost to 11.25 mg then resume, rck 2 weeks   2. Follow up in 2 weeks  3. Patient declines warfarin refills.  4. Verbal and written information provided. Patient expresses understanding and has no further questions at this time.    Merlyn Hassan, [facility]

## 2025-07-01 DIAGNOSIS — E78.2 MIXED HYPERLIPIDEMIA: ICD-10-CM

## 2025-07-02 ENCOUNTER — ANTICOAGULATION VISIT (OUTPATIENT)
Dept: PHARMACY | Facility: HOSPITAL | Age: 65
End: 2025-07-02
Payer: COMMERCIAL

## 2025-07-02 DIAGNOSIS — Z95.4 HISTORY OF AORTIC VALVE REPLACEMENT WITH METALLIC VALVE: Primary | ICD-10-CM

## 2025-07-02 DIAGNOSIS — E11.65 TYPE 2 DIABETES MELLITUS WITH HYPERGLYCEMIA, WITHOUT LONG-TERM CURRENT USE OF INSULIN: ICD-10-CM

## 2025-07-02 LAB
INR PPP: 2.1 (ref 0.91–1.09)
PROTHROMBIN TIME: 25.4 SECONDS (ref 10–13.8)

## 2025-07-02 PROCEDURE — 85610 PROTHROMBIN TIME: CPT

## 2025-07-02 PROCEDURE — G0463 HOSPITAL OUTPT CLINIC VISIT: HCPCS

## 2025-07-02 RX ORDER — ROSUVASTATIN CALCIUM 20 MG/1
20 TABLET, COATED ORAL
Qty: 90 TABLET | Refills: 0 | Status: SHIPPED | OUTPATIENT
Start: 2025-07-02

## 2025-07-02 RX ORDER — METFORMIN HYDROCHLORIDE 500 MG/1
1000 TABLET, EXTENDED RELEASE ORAL
Qty: 180 TABLET | Refills: 0 | Status: SHIPPED | OUTPATIENT
Start: 2025-07-02

## 2025-07-02 RX ORDER — WARFARIN SODIUM 7.5 MG/1
TABLET ORAL
Qty: 75 TABLET | Refills: 1 | Status: SHIPPED | OUTPATIENT
Start: 2025-07-02

## 2025-07-02 NOTE — PROGRESS NOTES
Anticoagulation Clinic Progress Note    Anticoagulation Summary  As of 2025      INR goal:  2.5-3.5   TTR:  43.1% (6.8 y)   INR used for dosin.1 (2025)   Warfarin maintenance plan:  3.75 mg every Sun, e, Thu; 7.5 mg all other days   Weekly warfarin total:  41.25 mg   Plan last modified:  Merlyn Hassan RPH (2025)   Next INR check:  2025   Priority:  Maintenance   Target end date:  Indefinite    Indications    History of aortic valve replacement with metallic valve [Z95.4]                 Anticoagulation Episode Summary       INR check location:  --    Preferred lab:  --    Send INR reminders to:  [facility] [place]    Comments:  --          Anticoagulation Care Providers       Provider Role Specialty Phone number    Kitty Lagunas MD Referring Cardiology [phone]            Clinic Interview:  Patient Findings     Negatives:  Signs/symptoms of thrombosis, Signs/symptoms of bleeding,   Laboratory test error suspected, Change in health, Change in alcohol use,   Change in activity, Upcoming invasive procedure, Emergency department   visit, Upcoming dental procedure, Missed doses, Extra doses, Change in   medications, Change in diet/appetite, Hospital admission, Bruising, Other   complaints      Clinical Outcomes     Negatives:  Major bleeding event, Thromboembolic event,   Anticoagulation-related hospital admission, Anticoagulation-related ED   visit, Anticoagulation-related fatality        INR History:      3/6/2025    10:15 AM 3/10/2025    11:15 AM 3/14/2025     8:30 AM 3/21/2025     8:15 AM 2025     8:15 AM 2025     8:15 AM 2025     8:15 AM   Anticoagulation Monitoring   INR 2.8 1.2 2.0 2.5 3.5 1.7 2.1   INR Date 3/6/2025 3/10/2025 3/14/2025 3/21/2025 2025 2025 2025   INR Goal 2.5-3.5 2.5-3.5 2.5-3.5 2.5-3.5 2.5-3.5 2.5-3.5 2.5-3.5   Trend Same Same Same Same Same Same Up   Last Week Total 37.5 mg 22.5 mg 22.5 mg 37.5 mg 37.5 mg 37.5 mg 37.5 mg    Next Week Total 26.25 mg 37.5 mg 37.5 mg 37.5 mg 37.5 mg 45 mg 45 mg   Sun Hold (3/9) - 3.75 mg 3.75 mg 3.75 mg 3.75 mg 3.75 mg   Mon Hold (3/10) Hold (3/10) 7.5 mg 7.5 mg 7.5 mg 7.5 mg 7.5 mg   Tue 7.5 mg (3/11) 7.5 mg (3/11) 3.75 mg 3.75 mg 3.75 mg 11.25 mg (6/17); Otherwise 3.75 mg 3.75 mg   Wed 7.5 mg 7.5 mg 7.5 mg 7.5 mg 7.5 mg 7.5 mg 11.25 mg (7/2); Otherwise 7.5 mg   Thu 7.5 mg (3/13); Otherwise 3.75 mg 7.5 mg (3/13) 3.75 mg 3.75 mg 3.75 mg 3.75 mg 3.75 mg   Fri 7.5 mg - 7.5 mg 7.5 mg 7.5 mg 7.5 mg 7.5 mg   Sat Hold (3/8) - 3.75 mg 3.75 mg 3.75 mg 3.75 mg 7.5 mg       Plan:  1. INR is Subtherapeutic today- see above in Anticoagulation Summary.  Will instruct Stew GABINO Cabral to Increase their warfarin regimen- see above in Anticoagulation Summary.  boost to 11.25 mg then trial on 3.75 mg on sun, tues, thurs and 7.5 mg AOD, rck 2 week  2. Follow up in 2 weeks  3. Patient declines warfarin refills.  4. Verbal and written information provided. Patient expresses understanding and has no further questions at this time.    Merlyn Hassan, [facility]

## 2025-07-16 ENCOUNTER — ANTICOAGULATION VISIT (OUTPATIENT)
Dept: PHARMACY | Facility: HOSPITAL | Age: 65
End: 2025-07-16
Payer: COMMERCIAL

## 2025-07-16 DIAGNOSIS — Z95.4 HISTORY OF AORTIC VALVE REPLACEMENT WITH METALLIC VALVE: Primary | ICD-10-CM

## 2025-07-16 LAB
INR PPP: 3.5 (ref 0.91–1.09)
PROTHROMBIN TIME: 42.2 SECONDS (ref 10–13.8)

## 2025-07-16 PROCEDURE — 85610 PROTHROMBIN TIME: CPT

## 2025-07-16 PROCEDURE — G0463 HOSPITAL OUTPT CLINIC VISIT: HCPCS

## 2025-07-16 NOTE — PROGRESS NOTES
Anticoagulation Clinic Progress Note    Anticoagulation Summary  As of 7/16/2025      INR goal:  2.5-3.5   TTR:  43.2% (6.8 y)   INR used for dosing:  3.5 (7/16/2025)   Warfarin maintenance plan:  3.75 mg every Sun, Tue, Thu; 7.5 mg all other days   Weekly warfarin total:  41.25 mg   No change documented:  Sue Woodruff   Plan last modified:  Merlyn Hassan RPH (7/2/2025)   Next INR check:  7/30/2025   Priority:  Maintenance   Target end date:  Indefinite    Indications    History of aortic valve replacement with metallic valve [Z95.4]                 Anticoagulation Episode Summary       INR check location:  --    Preferred lab:  --    Send INR reminders to:   [facility]S [facility] [place]    Comments:  --          Anticoagulation Care Providers       Provider Role Specialty Phone number    Kitty Lagunas MD Referring Cardiology [phone]            Clinic Interview:  Patient Findings     Negatives:  Signs/symptoms of thrombosis, Signs/symptoms of bleeding,   Laboratory test error suspected, Change in health, Change in alcohol use,   Change in activity, Upcoming invasive procedure, Emergency department   visit, Upcoming dental procedure, Missed doses, Extra doses, Change in   medications, Change in diet/appetite, Hospital admission, Bruising, Other   complaints      Clinical Outcomes     Negatives:  Major bleeding event, Thromboembolic event,   Anticoagulation-related hospital admission, Anticoagulation-related ED   visit, Anticoagulation-related fatality        INR History:      3/10/2025    11:15 AM 3/14/2025     8:30 AM 3/21/2025     8:15 AM 5/20/2025     8:15 AM 6/17/2025     8:15 AM 7/2/2025     8:15 AM 7/16/2025     8:15 AM   Anticoagulation Monitoring   INR 1.2 2.0 2.5 3.5 1.7 2.1 3.5   INR Date 3/10/2025 3/14/2025 3/21/2025 5/20/2025 6/17/2025 7/2/2025 7/16/2025   INR Goal 2.5-3.5 2.5-3.5 2.5-3.5 2.5-3.5 2.5-3.5 2.5-3.5 2.5-3.5   Trend Same Same Same Same Same Up Same   Last Week Total 22.5  mg 22.5 mg 37.5 mg 37.5 mg 37.5 mg 37.5 mg 41.25 mg   Next Week Total 37.5 mg 37.5 mg 37.5 mg 37.5 mg 45 mg 45 mg 41.25 mg   Sun - 3.75 mg 3.75 mg 3.75 mg 3.75 mg 3.75 mg 3.75 mg   Mon Hold (3/10) 7.5 mg 7.5 mg 7.5 mg 7.5 mg 7.5 mg 7.5 mg   Tue 7.5 mg (3/11) 3.75 mg 3.75 mg 3.75 mg 11.25 mg (6/17); Otherwise 3.75 mg 3.75 mg 3.75 mg   Wed 7.5 mg 7.5 mg 7.5 mg 7.5 mg 7.5 mg 11.25 mg (7/2); Otherwise 7.5 mg 7.5 mg   Thu 7.5 mg (3/13) 3.75 mg 3.75 mg 3.75 mg 3.75 mg 3.75 mg 3.75 mg   Fri - 7.5 mg 7.5 mg 7.5 mg 7.5 mg 7.5 mg 7.5 mg   Sat - 3.75 mg 3.75 mg 3.75 mg 3.75 mg 7.5 mg 7.5 mg       Plan:  1. INR is therapeutic today- see above in Anticoagulation Summary.   Will instruct Stew GABINO Cabral to continue their warfarin regimen- see above in Anticoagulation Summary.  2. Follow up in 2 weeks.  3. Patient declines warfarin refills.  4. Verbal and written information provided. Patient expresses understanding and has no further questions at this time.    Sue Woodruff

## 2025-07-30 ENCOUNTER — ANTICOAGULATION VISIT (OUTPATIENT)
Dept: PHARMACY | Facility: HOSPITAL | Age: 65
End: 2025-07-30
Payer: COMMERCIAL

## 2025-07-30 DIAGNOSIS — Z95.4 HISTORY OF AORTIC VALVE REPLACEMENT WITH METALLIC VALVE: Primary | ICD-10-CM

## 2025-07-30 LAB
INR PPP: 3.5 (ref 0.91–1.09)
PROTHROMBIN TIME: 42.2 SECONDS (ref 10–13.8)

## 2025-07-30 PROCEDURE — G0463 HOSPITAL OUTPT CLINIC VISIT: HCPCS

## 2025-07-30 PROCEDURE — 85610 PROTHROMBIN TIME: CPT

## 2025-07-30 NOTE — PROGRESS NOTES
Anticoagulation Clinic Progress Note    Anticoagulation Summary  As of 7/30/2025      INR goal:  2.5-3.5   TTR:  43.6% (6.9 y)   INR used for dosing:  3.5 (7/30/2025)   Warfarin maintenance plan:  3.75 mg every Sun, Tue, Thu; 7.5 mg all other days   Weekly warfarin total:  41.25 mg   No change documented:  Garcia Pendleton, Pharmacy Technician   Plan last modified:  Merlyn Hassan RPH (7/2/2025)   Next INR check:  8/27/2025   Priority:  Maintenance   Target end date:  Indefinite    Indications    History of aortic valve replacement with metallic valve [Z95.4]                 Anticoagulation Episode Summary       INR check location:  --    Preferred lab:  --    Send INR reminders to:   [facility]S [facility] [place]    Comments:  --          Anticoagulation Care Providers       Provider Role Specialty Phone number    Kitty Lagunas MD Referring Cardiology [phone]            Clinic Interview:  Patient Findings     Positives:  Upcoming dental procedure    Negatives:  Signs/symptoms of thrombosis, Signs/symptoms of bleeding,   Laboratory test error suspected, Change in health, Change in alcohol use,   Change in activity, Upcoming invasive procedure, Emergency department   visit, Missed doses, Extra doses, Change in medications, Change in   diet/appetite, Hospital admission, Bruising, Other complaints    Comments:  He reports he may have root canal or tooth extraction   scheduled in near future. He will notify clinic if warfarin needs to be   interrupted surrounding this procedure.       Clinical Outcomes     Negatives:  Major bleeding event, Thromboembolic event,   Anticoagulation-related hospital admission, Anticoagulation-related ED   visit, Anticoagulation-related fatality    Comments:  He reports he may have root canal or tooth extraction   scheduled in near future. He will notify clinic if warfarin needs to be   interrupted surrounding this procedure.        INR History:      3/14/2025     8:30 AM 3/21/2025      8:15 AM 5/20/2025     8:15 AM 6/17/2025     8:15 AM 7/2/2025     8:15 AM 7/16/2025     8:15 AM 7/30/2025     8:00 AM   Anticoagulation Monitoring   INR 2.0 2.5 3.5 1.7 2.1 3.5 3.5   INR Date 3/14/2025 3/21/2025 5/20/2025 6/17/2025 7/2/2025 7/16/2025 7/30/2025   INR Goal 2.5-3.5 2.5-3.5 2.5-3.5 2.5-3.5 2.5-3.5 2.5-3.5 2.5-3.5   Trend Same Same Same Same Up Same Same   Last Week Total 22.5 mg 37.5 mg 37.5 mg 37.5 mg 37.5 mg 41.25 mg 41.25 mg   Next Week Total 37.5 mg 37.5 mg 37.5 mg 45 mg 45 mg 41.25 mg 41.25 mg   Sun 3.75 mg 3.75 mg 3.75 mg 3.75 mg 3.75 mg 3.75 mg 3.75 mg   Mon 7.5 mg 7.5 mg 7.5 mg 7.5 mg 7.5 mg 7.5 mg 7.5 mg   Tue 3.75 mg 3.75 mg 3.75 mg 11.25 mg (6/17); Otherwise 3.75 mg 3.75 mg 3.75 mg 3.75 mg   Wed 7.5 mg 7.5 mg 7.5 mg 7.5 mg 11.25 mg (7/2); Otherwise 7.5 mg 7.5 mg 7.5 mg   Thu 3.75 mg 3.75 mg 3.75 mg 3.75 mg 3.75 mg 3.75 mg 3.75 mg   Fri 7.5 mg 7.5 mg 7.5 mg 7.5 mg 7.5 mg 7.5 mg 7.5 mg   Sat 3.75 mg 3.75 mg 3.75 mg 3.75 mg 7.5 mg 7.5 mg 7.5 mg       Plan:  1. INR is therapeutic today- see above in Anticoagulation Summary.   Will instruct Stew GABINO Cabral to continue their warfarin regimen- see above in Anticoagulation Summary.  2. Follow up in 4 weeks.  3. Patient declines warfarin refills.  4. Verbal and written information provided. Patient expresses understanding and has no further questions at this time.    Garcia Pendleton, Pharmacy Technician

## 2025-08-08 ENCOUNTER — OFFICE VISIT (OUTPATIENT)
Dept: FAMILY MEDICINE CLINIC | Facility: CLINIC | Age: 65
End: 2025-08-08
Payer: COMMERCIAL

## 2025-08-08 VITALS
HEIGHT: 69 IN | SYSTOLIC BLOOD PRESSURE: 120 MMHG | BODY MASS INDEX: 24.99 KG/M2 | OXYGEN SATURATION: 97 % | HEART RATE: 77 BPM | TEMPERATURE: 98.1 F | WEIGHT: 168.7 LBS | DIASTOLIC BLOOD PRESSURE: 62 MMHG

## 2025-08-08 DIAGNOSIS — Z12.5 SCREENING FOR MALIGNANT NEOPLASM OF PROSTATE: ICD-10-CM

## 2025-08-08 DIAGNOSIS — E11.65 TYPE 2 DIABETES MELLITUS WITH HYPERGLYCEMIA, WITHOUT LONG-TERM CURRENT USE OF INSULIN: Primary | ICD-10-CM

## 2025-08-08 DIAGNOSIS — Z87.891 PERSONAL HISTORY OF TOBACCO USE, PRESENTING HAZARDS TO HEALTH: ICD-10-CM

## 2025-08-08 DIAGNOSIS — F90.2 ATTENTION DEFICIT HYPERACTIVITY DISORDER (ADHD), COMBINED TYPE: ICD-10-CM

## 2025-08-08 DIAGNOSIS — E78.2 MIXED HYPERLIPIDEMIA: ICD-10-CM

## 2025-08-08 PROCEDURE — 99214 OFFICE O/P EST MOD 30 MIN: CPT | Performed by: FAMILY MEDICINE

## 2025-08-08 RX ORDER — ROSUVASTATIN CALCIUM 20 MG/1
20 TABLET, COATED ORAL
Qty: 90 TABLET | Refills: 0 | Status: SHIPPED | OUTPATIENT
Start: 2025-08-08

## 2025-08-08 RX ORDER — METFORMIN HYDROCHLORIDE 500 MG/1
1000 TABLET, EXTENDED RELEASE ORAL
Qty: 180 TABLET | Refills: 3 | Status: SHIPPED | OUTPATIENT
Start: 2025-08-08

## 2025-08-08 RX ORDER — LISDEXAMFETAMINE DIMESYLATE 30 MG/1
30 CAPSULE ORAL EVERY MORNING
Qty: 30 CAPSULE | Refills: 0 | Status: SHIPPED | OUTPATIENT
Start: 2025-08-08

## 2025-08-09 LAB
25(OH)D3+25(OH)D2 SERPL-MCNC: 11.8 NG/ML (ref 30–100)
ALBUMIN SERPL-MCNC: 4.5 G/DL (ref 3.9–4.9)
ALBUMIN/CREAT UR: 14 MG/G CREAT (ref 0–29)
ALP SERPL-CCNC: 91 IU/L (ref 44–121)
ALT SERPL-CCNC: 17 IU/L (ref 0–44)
AST SERPL-CCNC: 21 IU/L (ref 0–40)
BASOPHILS # BLD AUTO: 0.1 X10E3/UL (ref 0–0.2)
BASOPHILS NFR BLD AUTO: 1 %
BILIRUB SERPL-MCNC: 0.5 MG/DL (ref 0–1.2)
BUN SERPL-MCNC: 15 MG/DL (ref 8–27)
BUN/CREAT SERPL: 13 (ref 10–24)
CALCIUM SERPL-MCNC: 9.3 MG/DL (ref 8.6–10.2)
CHLORIDE SERPL-SCNC: 102 MMOL/L (ref 96–106)
CHOLEST SERPL-MCNC: 146 MG/DL (ref 100–199)
CO2 SERPL-SCNC: 22 MMOL/L (ref 20–29)
CREAT SERPL-MCNC: 1.15 MG/DL (ref 0.76–1.27)
CREAT UR-MCNC: 226.7 MG/DL
EGFRCR SERPLBLD CKD-EPI 2021: 72 ML/MIN/1.73
EOSINOPHIL # BLD AUTO: 0.3 X10E3/UL (ref 0–0.4)
EOSINOPHIL NFR BLD AUTO: 3 %
ERYTHROCYTE [DISTWIDTH] IN BLOOD BY AUTOMATED COUNT: 13.5 % (ref 11.6–15.4)
GLOBULIN SER CALC-MCNC: 2.2 G/DL (ref 1.5–4.5)
GLUCOSE SERPL-MCNC: 131 MG/DL (ref 70–99)
HBA1C MFR BLD: 6.6 % (ref 4.8–5.6)
HCT VFR BLD AUTO: 48.8 % (ref 37.5–51)
HDLC SERPL-MCNC: 39 MG/DL
HGB BLD-MCNC: 16.2 G/DL (ref 13–17.7)
IMM GRANULOCYTES # BLD AUTO: 0 X10E3/UL (ref 0–0.1)
IMM GRANULOCYTES NFR BLD AUTO: 0 %
LDLC SERPL CALC-MCNC: 80 MG/DL (ref 0–99)
LYMPHOCYTES # BLD AUTO: 2.4 X10E3/UL (ref 0.7–3.1)
LYMPHOCYTES NFR BLD AUTO: 26 %
MCH RBC QN AUTO: 31.2 PG (ref 26.6–33)
MCHC RBC AUTO-ENTMCNC: 33.2 G/DL (ref 31.5–35.7)
MCV RBC AUTO: 94 FL (ref 79–97)
MICROALBUMIN UR-MCNC: 30.9 UG/ML
MONOCYTES # BLD AUTO: 0.7 X10E3/UL (ref 0.1–0.9)
MONOCYTES NFR BLD AUTO: 7 %
NEUTROPHILS # BLD AUTO: 5.9 X10E3/UL (ref 1.4–7)
NEUTROPHILS NFR BLD AUTO: 63 %
PLATELET # BLD AUTO: 214 X10E3/UL (ref 150–450)
POTASSIUM SERPL-SCNC: 4.6 MMOL/L (ref 3.5–5.2)
PROT SERPL-MCNC: 6.7 G/DL (ref 6–8.5)
PSA SERPL-MCNC: 4.2 NG/ML (ref 0–4)
RBC # BLD AUTO: 5.2 X10E6/UL (ref 4.14–5.8)
SODIUM SERPL-SCNC: 140 MMOL/L (ref 134–144)
TRIGL SERPL-MCNC: 156 MG/DL (ref 0–149)
TSH SERPL DL<=0.005 MIU/L-ACNC: 2.37 UIU/ML (ref 0.45–4.5)
VIT B12 SERPL-MCNC: 398 PG/ML (ref 232–1245)
VLDLC SERPL CALC-MCNC: 27 MG/DL (ref 5–40)
WBC # BLD AUTO: 9.4 X10E3/UL (ref 3.4–10.8)

## 2025-08-10 LAB
AMPHETAMINES UR QL SCN: NEGATIVE NG/ML
BARBITURATES UR QL SCN: NEGATIVE NG/ML
BENZODIAZ UR QL SCN: NEGATIVE NG/ML
BZE UR QL SCN: NEGATIVE NG/ML
CANNABINOIDS UR QL SCN: POSITIVE NG/ML
CREAT UR-MCNC: 216.4 MG/DL (ref 20–300)
LABORATORY COMMENT REPORT: ABNORMAL
METHADONE UR QL SCN: NEGATIVE NG/ML
OPIATES UR QL SCN: NEGATIVE NG/ML
OXYCODONE+OXYMORPHONE UR QL SCN: NEGATIVE NG/ML
PCP UR QL: NEGATIVE NG/ML
PH UR: 5.1 [PH] (ref 4.5–8.9)
PROPOXYPH UR QL SCN: NEGATIVE NG/ML

## 2025-08-20 RX ORDER — WARFARIN SODIUM 7.5 MG/1
TABLET ORAL
Qty: 75 TABLET | Refills: 1 | Status: SHIPPED | OUTPATIENT
Start: 2025-08-20

## 2025-08-26 ENCOUNTER — HOSPITAL ENCOUNTER (OUTPATIENT)
Dept: CT IMAGING | Facility: HOSPITAL | Age: 65
Discharge: HOME | End: 2025-08-26
Admitting: FAMILY MEDICINE
Payer: COMMERCIAL

## 2025-08-26 DIAGNOSIS — Z87.891 PERSONAL HISTORY OF TOBACCO USE, PRESENTING HAZARDS TO HEALTH: ICD-10-CM

## 2025-08-26 PROCEDURE — 71271 CT THORAX LUNG CANCER SCR C-: CPT

## (undated) DEVICE — KT ORCA ORCAPOD DISP STRL

## (undated) DEVICE — SNAR POLYP SENSATION STDOVL 27 240 BX40

## (undated) DEVICE — LN SMPL CO2 SHTRM SD STREAM W/M LUER

## (undated) DEVICE — PATIENT RETURN ELECTRODE, SINGLE-USE, CONTACT QUALITY MONITORING, ADULT, WITH 9FT CORD, FOR PATIENTS WEIGING OVER 33LBS. (15KG): Brand: MEGADYNE

## (undated) DEVICE — SENSR O2 OXIMAX FNGR A/ 18IN NONSTR

## (undated) DEVICE — ADAPT CLN BIOGUARD AIR/H2O DISP

## (undated) DEVICE — TUBING, SUCTION, 1/4" X 10', STRAIGHT: Brand: MEDLINE

## (undated) DEVICE — THE SINGLE USE ETRAP – POLYP TRAP IS USED FOR SUCTION RETRIEVAL OF ENDOSCOPICALLY REMOVED POLYPS.: Brand: ETRAP

## (undated) DEVICE — CANN O2 ETCO2 FITS ALL CONN CO2 SMPL A/ 7IN DISP LF